# Patient Record
Sex: FEMALE | Race: BLACK OR AFRICAN AMERICAN | Employment: OTHER | URBAN - METROPOLITAN AREA
[De-identification: names, ages, dates, MRNs, and addresses within clinical notes are randomized per-mention and may not be internally consistent; named-entity substitution may affect disease eponyms.]

---

## 2018-03-08 DIAGNOSIS — R52 PAIN: Primary | ICD-10-CM

## 2018-03-13 ENCOUNTER — HOSPITAL ENCOUNTER (OUTPATIENT)
Dept: GENERAL RADIOLOGY | Age: 53
Discharge: HOME | End: 2018-03-15
Payer: MEDICAID

## 2018-03-13 ENCOUNTER — OFFICE VISIT (OUTPATIENT)
Dept: ORTHOPEDIC SURGERY | Age: 53
End: 2018-03-13
Payer: MEDICAID

## 2018-03-13 VITALS — RESPIRATION RATE: 17 BRPM | HEIGHT: 70 IN | OXYGEN SATURATION: 97 % | WEIGHT: 190 LBS | BODY MASS INDEX: 27.2 KG/M2

## 2018-03-13 DIAGNOSIS — R52 PAIN: ICD-10-CM

## 2018-03-13 DIAGNOSIS — S82.842A BIMALLEOLAR ANKLE FRACTURE, LEFT, CLOSED, INITIAL ENCOUNTER: ICD-10-CM

## 2018-03-13 PROCEDURE — G8484 FLU IMMUNIZE NO ADMIN: HCPCS | Performed by: ORTHOPAEDIC SURGERY

## 2018-03-13 PROCEDURE — G8427 DOCREV CUR MEDS BY ELIG CLIN: HCPCS | Performed by: ORTHOPAEDIC SURGERY

## 2018-03-13 PROCEDURE — G8417 CALC BMI ABV UP PARAM F/U: HCPCS | Performed by: ORTHOPAEDIC SURGERY

## 2018-03-13 PROCEDURE — 3017F COLORECTAL CA SCREEN DOC REV: CPT | Performed by: ORTHOPAEDIC SURGERY

## 2018-03-13 PROCEDURE — 4004F PT TOBACCO SCREEN RCVD TLK: CPT | Performed by: ORTHOPAEDIC SURGERY

## 2018-03-13 PROCEDURE — 99215 OFFICE O/P EST HI 40 MIN: CPT | Performed by: ORTHOPAEDIC SURGERY

## 2018-03-13 PROCEDURE — 73610 X-RAY EXAM OF ANKLE: CPT

## 2018-03-13 RX ORDER — BUSPIRONE HYDROCHLORIDE 5 MG/1
5 TABLET ORAL 3 TIMES DAILY
Status: ON HOLD | COMMUNITY
End: 2018-05-14 | Stop reason: HOSPADM

## 2018-03-13 ASSESSMENT — ENCOUNTER SYMPTOMS
CONSTIPATION: 0
NAUSEA: 0
RHINORRHEA: 0
ABDOMINAL PAIN: 0
WHEEZING: 0
SHORTNESS OF BREATH: 0
SINUS PRESSURE: 1
DIARRHEA: 0
VOMITING: 0
BLOOD IN STOOL: 0
SORE THROAT: 0
COUGH: 1
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
## 2018-03-22 ENCOUNTER — CLINICAL DOCUMENTATION (OUTPATIENT)
Dept: ORTHOPEDIC SURGERY | Age: 53
End: 2018-03-22

## 2018-03-22 NOTE — PROGRESS NOTES
Notified today by Thiago Mcfarlane that patient called and cancelled surgery on same day. He wants letter of termination sent to patient. Letters x's 2 done -- 1 sent regular mail and 1 sent certified mail along with MONICA form.

## 2018-04-16 ENCOUNTER — HOSPITAL ENCOUNTER (EMERGENCY)
Age: 53
Discharge: HOME | End: 2018-04-16
Attending: EMERGENCY MEDICINE
Payer: MEDICAID

## 2018-04-16 ENCOUNTER — APPOINTMENT (OUTPATIENT)
Dept: GENERAL RADIOLOGY | Age: 53
End: 2018-04-16
Payer: MEDICAID

## 2018-04-16 VITALS
DIASTOLIC BLOOD PRESSURE: 152 MMHG | SYSTOLIC BLOOD PRESSURE: 205 MMHG | BODY MASS INDEX: 41.52 KG/M2 | TEMPERATURE: 98.4 F | HEART RATE: 97 BPM | HEIGHT: 70 IN | WEIGHT: 290 LBS | OXYGEN SATURATION: 97 %

## 2018-04-16 DIAGNOSIS — S82.892A CLOSED FRACTURE OF LEFT ANKLE, INITIAL ENCOUNTER: Primary | ICD-10-CM

## 2018-04-16 PROCEDURE — 29515 APPLICATION SHORT LEG SPLINT: CPT

## 2018-04-16 PROCEDURE — 6370000000 HC RX 637 (ALT 250 FOR IP): Performed by: NURSE PRACTITIONER

## 2018-04-16 PROCEDURE — 73610 X-RAY EXAM OF ANKLE: CPT

## 2018-04-16 PROCEDURE — 99283 EMERGENCY DEPT VISIT LOW MDM: CPT

## 2018-04-16 RX ORDER — NAPROXEN 500 MG/1
500 TABLET ORAL 2 TIMES DAILY
Qty: 14 TABLET | Refills: 0 | Status: SHIPPED | OUTPATIENT
Start: 2018-04-16 | End: 2018-05-04 | Stop reason: ALTCHOICE

## 2018-04-16 RX ORDER — IBUPROFEN 800 MG/1
800 TABLET ORAL ONCE
Status: COMPLETED | OUTPATIENT
Start: 2018-04-16 | End: 2018-04-16

## 2018-04-16 RX ADMIN — IBUPROFEN 800 MG: 800 TABLET, FILM COATED ORAL at 18:09

## 2018-04-16 ASSESSMENT — PAIN SCALES - GENERAL
PAINLEVEL_OUTOF10: 10
PAINLEVEL_OUTOF10: 10

## 2018-04-16 ASSESSMENT — PAIN DESCRIPTION - LOCATION: LOCATION: ANKLE

## 2018-04-16 ASSESSMENT — PAIN DESCRIPTION - ORIENTATION: ORIENTATION: LEFT

## 2018-04-16 ASSESSMENT — PAIN DESCRIPTION - ONSET: ONSET: ON-GOING

## 2018-04-16 ASSESSMENT — PAIN DESCRIPTION - FREQUENCY: FREQUENCY: CONTINUOUS

## 2018-04-16 ASSESSMENT — PAIN DESCRIPTION - DESCRIPTORS: DESCRIPTORS: ACHING;SORE;SHARP

## 2018-04-16 ASSESSMENT — PAIN DESCRIPTION - PAIN TYPE: TYPE: ACUTE PAIN

## 2018-04-16 ASSESSMENT — PAIN DESCRIPTION - PROGRESSION: CLINICAL_PROGRESSION: NOT CHANGED

## 2018-04-17 ENCOUNTER — TELEPHONE (OUTPATIENT)
Dept: ORTHOPEDIC SURGERY | Age: 53
End: 2018-04-17

## 2018-05-04 ENCOUNTER — HOSPITAL ENCOUNTER (INPATIENT)
Age: 53
LOS: 10 days | Discharge: HOME | DRG: 751 | End: 2018-05-14
Attending: EMERGENCY MEDICINE | Admitting: PSYCHIATRY & NEUROLOGY
Payer: MEDICAID

## 2018-05-04 DIAGNOSIS — R45.851 SUICIDAL IDEATION: Primary | ICD-10-CM

## 2018-05-04 PROBLEM — F33.9 MAJOR DEPRESSION, RECURRENT (HCC): Status: ACTIVE | Noted: 2018-05-04

## 2018-05-04 LAB
ACETAMINOPHEN LEVEL: <5 MCG/ML (ref 10–30)
ALBUMIN SERPL-MCNC: 3.7 G/DL (ref 3.5–5.2)
ALP BLD-CCNC: 133 U/L (ref 35–104)
ALT SERPL-CCNC: 5 U/L (ref 0–32)
AMPHETAMINE SCREEN, URINE: NOT DETECTED
ANION GAP SERPL CALCULATED.3IONS-SCNC: 14 MMOL/L (ref 7–16)
AST SERPL-CCNC: 10 U/L (ref 0–31)
BACTERIA: ABNORMAL /HPF
BARBITURATE SCREEN URINE: NOT DETECTED
BASOPHILS ABSOLUTE: 0.02 E9/L (ref 0–0.2)
BASOPHILS RELATIVE PERCENT: 0.2 % (ref 0–2)
BENZODIAZEPINE SCREEN, URINE: NOT DETECTED
BILIRUB SERPL-MCNC: 0.6 MG/DL (ref 0–1.2)
BILIRUBIN URINE: NEGATIVE
BLOOD, URINE: ABNORMAL
BUN BLDV-MCNC: 7 MG/DL (ref 6–20)
CALCIUM SERPL-MCNC: 8.8 MG/DL (ref 8.6–10.2)
CANNABINOID SCREEN URINE: NOT DETECTED
CHLORIDE BLD-SCNC: 100 MMOL/L (ref 98–107)
CHP ED QC CHECK: YES
CLARITY: CLEAR
CO2: 27 MMOL/L (ref 22–29)
COCAINE METABOLITE SCREEN URINE: POSITIVE
COLOR: YELLOW
CREAT SERPL-MCNC: 0.8 MG/DL (ref 0.5–1)
EKG ATRIAL RATE: 86 BPM
EKG P AXIS: 47 DEGREES
EKG P-R INTERVAL: 138 MS
EKG Q-T INTERVAL: 398 MS
EKG QRS DURATION: 88 MS
EKG QTC CALCULATION (BAZETT): 476 MS
EKG R AXIS: 4 DEGREES
EKG T AXIS: 2 DEGREES
EKG VENTRICULAR RATE: 86 BPM
EOSINOPHILS ABSOLUTE: 0.08 E9/L (ref 0.05–0.5)
EOSINOPHILS RELATIVE PERCENT: 0.8 % (ref 0–6)
EPITHELIAL CELLS, UA: ABNORMAL /HPF
ETHANOL: <10 MG/DL (ref 0–0.08)
GFR AFRICAN AMERICAN: >60
GFR NON-AFRICAN AMERICAN: >60 ML/MIN/1.73
GLUCOSE BLD-MCNC: 93 MG/DL (ref 74–109)
GLUCOSE URINE: NEGATIVE MG/DL
HCT VFR BLD CALC: 38.2 % (ref 34–48)
HEMOGLOBIN: 12.3 G/DL (ref 11.5–15.5)
IMMATURE GRANULOCYTES #: 0.03 E9/L
IMMATURE GRANULOCYTES %: 0.3 % (ref 0–5)
KETONES, URINE: 15 MG/DL
LEUKOCYTE ESTERASE, URINE: NEGATIVE
LYMPHOCYTES ABSOLUTE: 3.36 E9/L (ref 1.5–4)
LYMPHOCYTES RELATIVE PERCENT: 32.8 % (ref 20–42)
MCH RBC QN AUTO: 25.1 PG (ref 26–35)
MCHC RBC AUTO-ENTMCNC: 32.2 % (ref 32–34.5)
MCV RBC AUTO: 77.8 FL (ref 80–99.9)
METHADONE SCREEN, URINE: NOT DETECTED
MONOCYTES ABSOLUTE: 0.54 E9/L (ref 0.1–0.95)
MONOCYTES RELATIVE PERCENT: 5.3 % (ref 2–12)
NEUTROPHILS ABSOLUTE: 6.21 E9/L (ref 1.8–7.3)
NEUTROPHILS RELATIVE PERCENT: 60.6 % (ref 43–80)
NITRITE, URINE: NEGATIVE
OPIATE SCREEN URINE: NOT DETECTED
PDW BLD-RTO: 17 FL (ref 11.5–15)
PH UA: 6 (ref 5–9)
PHENCYCLIDINE SCREEN URINE: NOT DETECTED
PLATELET # BLD: 368 E9/L (ref 130–450)
PMV BLD AUTO: 10 FL (ref 7–12)
POTASSIUM SERPL-SCNC: 3.5 MMOL/L (ref 3.5–5)
PREGNANCY TEST URINE, POC: NEGATIVE
PROPOXYPHENE SCREEN: NOT DETECTED
PROTEIN UA: NEGATIVE MG/DL
RBC # BLD: 4.91 E12/L (ref 3.5–5.5)
RBC UA: ABNORMAL /HPF (ref 0–2)
SALICYLATE, SERUM: <0.3 MG/DL (ref 0–30)
SODIUM BLD-SCNC: 141 MMOL/L (ref 132–146)
SPECIFIC GRAVITY UA: 1.01 (ref 1–1.03)
TOTAL PROTEIN: 7.2 G/DL (ref 6.4–8.3)
TRICYCLIC ANTIDEPRESSANTS SCREEN SERUM: NEGATIVE NG/ML
UROBILINOGEN, URINE: 0.2 E.U./DL
WBC # BLD: 10.2 E9/L (ref 4.5–11.5)
WBC UA: ABNORMAL /HPF (ref 0–5)

## 2018-05-04 PROCEDURE — 80307 DRUG TEST PRSMV CHEM ANLYZR: CPT

## 2018-05-04 PROCEDURE — 81001 URINALYSIS AUTO W/SCOPE: CPT

## 2018-05-04 PROCEDURE — 96374 THER/PROPH/DIAG INJ IV PUSH: CPT

## 2018-05-04 PROCEDURE — G0480 DRUG TEST DEF 1-7 CLASSES: HCPCS

## 2018-05-04 PROCEDURE — 80053 COMPREHEN METABOLIC PANEL: CPT

## 2018-05-04 PROCEDURE — 1240000000 HC EMOTIONAL WELLNESS R&B

## 2018-05-04 PROCEDURE — 85025 COMPLETE CBC W/AUTO DIFF WBC: CPT

## 2018-05-04 PROCEDURE — 6370000000 HC RX 637 (ALT 250 FOR IP): Performed by: PSYCHIATRY & NEUROLOGY

## 2018-05-04 PROCEDURE — 36415 COLL VENOUS BLD VENIPUNCTURE: CPT

## 2018-05-04 PROCEDURE — 99285 EMERGENCY DEPT VISIT HI MDM: CPT

## 2018-05-04 PROCEDURE — 6370000000 HC RX 637 (ALT 250 FOR IP): Performed by: EMERGENCY MEDICINE

## 2018-05-04 RX ORDER — HALOPERIDOL 5 MG
5 TABLET ORAL 2 TIMES DAILY
Status: DISCONTINUED | OUTPATIENT
Start: 2018-05-04 | End: 2018-05-06

## 2018-05-04 RX ORDER — LORAZEPAM 1 MG/1
0.5 TABLET ORAL EVERY 6 HOURS PRN
Status: DISCONTINUED | OUTPATIENT
Start: 2018-05-04 | End: 2018-05-10

## 2018-05-04 RX ORDER — DOCUSATE SODIUM 100 MG/1
100 CAPSULE, LIQUID FILLED ORAL 2 TIMES DAILY PRN
Status: DISCONTINUED | OUTPATIENT
Start: 2018-05-04 | End: 2018-05-14 | Stop reason: HOSPADM

## 2018-05-04 RX ORDER — LORAZEPAM 1 MG/1
2 TABLET ORAL ONCE
Status: COMPLETED | OUTPATIENT
Start: 2018-05-04 | End: 2018-05-04

## 2018-05-04 RX ORDER — QUETIAPINE FUMARATE 100 MG/1
100 TABLET, FILM COATED ORAL NIGHTLY
Status: DISCONTINUED | OUTPATIENT
Start: 2018-05-04 | End: 2018-05-06

## 2018-05-04 RX ORDER — QUETIAPINE FUMARATE 100 MG/1
1 TABLET, FILM COATED ORAL NIGHTLY
Status: ON HOLD | COMMUNITY
Start: 2018-04-26 | End: 2018-05-14 | Stop reason: HOSPADM

## 2018-05-04 RX ORDER — BENZTROPINE MESYLATE 2 MG/1
2 TABLET ORAL 2 TIMES DAILY
Status: DISCONTINUED | OUTPATIENT
Start: 2018-05-04 | End: 2018-05-14 | Stop reason: HOSPADM

## 2018-05-04 RX ORDER — ALBUTEROL SULFATE 90 UG/1
2 AEROSOL, METERED RESPIRATORY (INHALATION) EVERY 6 HOURS PRN
Status: DISCONTINUED | OUTPATIENT
Start: 2018-05-04 | End: 2018-05-14 | Stop reason: HOSPADM

## 2018-05-04 RX ORDER — ALBUTEROL SULFATE 90 UG/1
2 AEROSOL, METERED RESPIRATORY (INHALATION) EVERY 6 HOURS PRN
COMMUNITY
End: 2022-10-12

## 2018-05-04 RX ORDER — BUSPIRONE HYDROCHLORIDE 5 MG/1
5 TABLET ORAL 3 TIMES DAILY
Status: DISCONTINUED | OUTPATIENT
Start: 2018-05-04 | End: 2018-05-06

## 2018-05-04 RX ORDER — ACETAMINOPHEN 325 MG/1
650 TABLET ORAL EVERY 4 HOURS PRN
Status: DISCONTINUED | OUTPATIENT
Start: 2018-05-04 | End: 2018-05-14 | Stop reason: HOSPADM

## 2018-05-04 RX ADMIN — BENZTROPINE MESYLATE 2 MG: 2 TABLET ORAL at 22:57

## 2018-05-04 RX ADMIN — BUSPIRONE HYDROCHLORIDE 5 MG: 5 TABLET ORAL at 22:58

## 2018-05-04 RX ADMIN — LORAZEPAM 2 MG: 1 TABLET ORAL at 20:03

## 2018-05-04 RX ADMIN — QUETIAPINE FUMARATE 100 MG: 100 TABLET ORAL at 22:58

## 2018-05-04 RX ADMIN — HALOPERIDOL 5 MG: 5 TABLET ORAL at 22:58

## 2018-05-04 ASSESSMENT — SLEEP AND FATIGUE QUESTIONNAIRES
DO YOU HAVE DIFFICULTY SLEEPING: YES
RESTFUL SLEEP: NO
DIFFICULTY FALLING ASLEEP: YES
DIFFICULTY STAYING ASLEEP: YES
DO YOU USE A SLEEP AID: YES
AVERAGE NUMBER OF SLEEP HOURS: 4
DIFFICULTY ARISING: YES
SLEEP PATTERN: DIFFICULTY FALLING ASLEEP;DIFFICULTY ARISING;DISTURBED/INTERRUPTED SLEEP;EARLY AWAKENING;RESTLESSNESS;NIGHTMARES/TERRORS

## 2018-05-04 ASSESSMENT — PATIENT HEALTH QUESTIONNAIRE - PHQ9: SUM OF ALL RESPONSES TO PHQ QUESTIONS 1-9: 23

## 2018-05-04 ASSESSMENT — PAIN SCALES - GENERAL
PAINLEVEL_OUTOF10: 0
PAINLEVEL_OUTOF10: 8

## 2018-05-04 ASSESSMENT — LIFESTYLE VARIABLES: HISTORY_ALCOHOL_USE: NO

## 2018-05-05 LAB — TSH SERPL DL<=0.05 MIU/L-ACNC: 0.97 UIU/ML (ref 0.27–4.2)

## 2018-05-05 PROCEDURE — 1240000000 HC EMOTIONAL WELLNESS R&B

## 2018-05-05 PROCEDURE — 84443 ASSAY THYROID STIM HORMONE: CPT

## 2018-05-05 PROCEDURE — 99221 1ST HOSP IP/OBS SF/LOW 40: CPT | Performed by: PSYCHIATRY & NEUROLOGY

## 2018-05-05 PROCEDURE — 6370000000 HC RX 637 (ALT 250 FOR IP): Performed by: PSYCHIATRY & NEUROLOGY

## 2018-05-05 PROCEDURE — 36415 COLL VENOUS BLD VENIPUNCTURE: CPT

## 2018-05-05 RX ADMIN — BENZTROPINE MESYLATE 2 MG: 2 TABLET ORAL at 08:28

## 2018-05-05 RX ADMIN — BUSPIRONE HYDROCHLORIDE 5 MG: 5 TABLET ORAL at 20:28

## 2018-05-05 RX ADMIN — LORAZEPAM 0.5 MG: 1 TABLET ORAL at 11:21

## 2018-05-05 RX ADMIN — HALOPERIDOL 5 MG: 5 TABLET ORAL at 08:28

## 2018-05-05 RX ADMIN — HALOPERIDOL 5 MG: 5 TABLET ORAL at 20:28

## 2018-05-05 RX ADMIN — BENZTROPINE MESYLATE 2 MG: 2 TABLET ORAL at 20:28

## 2018-05-05 RX ADMIN — ACETAMINOPHEN 650 MG: 325 TABLET, FILM COATED ORAL at 19:11

## 2018-05-05 RX ADMIN — BUSPIRONE HYDROCHLORIDE 5 MG: 5 TABLET ORAL at 08:28

## 2018-05-05 RX ADMIN — QUETIAPINE FUMARATE 100 MG: 100 TABLET ORAL at 20:28

## 2018-05-05 RX ADMIN — BUSPIRONE HYDROCHLORIDE 5 MG: 5 TABLET ORAL at 13:52

## 2018-05-05 RX ADMIN — LORAZEPAM 0.5 MG: 1 TABLET ORAL at 17:47

## 2018-05-05 ASSESSMENT — SLEEP AND FATIGUE QUESTIONNAIRES
DO YOU HAVE DIFFICULTY SLEEPING: YES
DIFFICULTY STAYING ASLEEP: YES
DIFFICULTY ARISING: YES
AVERAGE NUMBER OF SLEEP HOURS: 4
RESTFUL SLEEP: NO
DO YOU USE A SLEEP AID: YES
SLEEP PATTERN: DIFFICULTY FALLING ASLEEP;DISTURBED/INTERRUPTED SLEEP;EARLY AWAKENING;RESTLESSNESS;NIGHTMARES/TERRORS
DIFFICULTY FALLING ASLEEP: YES

## 2018-05-05 ASSESSMENT — PATIENT HEALTH QUESTIONNAIRE - PHQ9: SUM OF ALL RESPONSES TO PHQ QUESTIONS 1-9: 23

## 2018-05-05 ASSESSMENT — PAIN - FUNCTIONAL ASSESSMENT: PAIN_FUNCTIONAL_ASSESSMENT: 0-10

## 2018-05-05 ASSESSMENT — PAIN SCALES - GENERAL: PAINLEVEL_OUTOF10: 9

## 2018-05-05 ASSESSMENT — LIFESTYLE VARIABLES: HISTORY_ALCOHOL_USE: NO

## 2018-05-06 ENCOUNTER — APPOINTMENT (OUTPATIENT)
Dept: GENERAL RADIOLOGY | Age: 53
DRG: 751 | End: 2018-05-06
Payer: MEDICAID

## 2018-05-06 PROCEDURE — 6370000000 HC RX 637 (ALT 250 FOR IP): Performed by: PSYCHIATRY & NEUROLOGY

## 2018-05-06 PROCEDURE — 1240000000 HC EMOTIONAL WELLNESS R&B

## 2018-05-06 PROCEDURE — 6370000000 HC RX 637 (ALT 250 FOR IP): Performed by: STUDENT IN AN ORGANIZED HEALTH CARE EDUCATION/TRAINING PROGRAM

## 2018-05-06 PROCEDURE — 73610 X-RAY EXAM OF ANKLE: CPT

## 2018-05-06 PROCEDURE — 99231 SBSQ HOSP IP/OBS SF/LOW 25: CPT | Performed by: PSYCHIATRY & NEUROLOGY

## 2018-05-06 RX ORDER — QUETIAPINE FUMARATE 300 MG/1
300 TABLET, FILM COATED ORAL NIGHTLY
Status: DISCONTINUED | OUTPATIENT
Start: 2018-05-06 | End: 2018-05-08

## 2018-05-06 RX ORDER — IBUPROFEN 600 MG/1
600 TABLET ORAL 2 TIMES DAILY
Status: DISCONTINUED | OUTPATIENT
Start: 2018-05-06 | End: 2018-05-14 | Stop reason: HOSPADM

## 2018-05-06 RX ORDER — BUSPIRONE HYDROCHLORIDE 10 MG/1
10 TABLET ORAL 3 TIMES DAILY
Status: DISCONTINUED | OUTPATIENT
Start: 2018-05-06 | End: 2018-05-14 | Stop reason: HOSPADM

## 2018-05-06 RX ORDER — DICLOFENAC SODIUM AND MISOPROSTOL 75; 200 MG/1; UG/1
1 TABLET, DELAYED RELEASE ORAL 2 TIMES DAILY
Status: DISCONTINUED | OUTPATIENT
Start: 2018-05-06 | End: 2018-05-06 | Stop reason: SDUPTHER

## 2018-05-06 RX ORDER — MISOPROSTOL 200 UG/1
200 TABLET ORAL 2 TIMES DAILY
Status: DISCONTINUED | OUTPATIENT
Start: 2018-05-06 | End: 2018-05-14 | Stop reason: HOSPADM

## 2018-05-06 RX ADMIN — ACETAMINOPHEN 650 MG: 325 TABLET, FILM COATED ORAL at 18:55

## 2018-05-06 RX ADMIN — BUSPIRONE HYDROCHLORIDE 10 MG: 10 TABLET ORAL at 13:02

## 2018-05-06 RX ADMIN — BUSPIRONE HYDROCHLORIDE 10 MG: 10 TABLET ORAL at 20:28

## 2018-05-06 RX ADMIN — MISOPROSTOL 200 MCG: 200 TABLET ORAL at 22:07

## 2018-05-06 RX ADMIN — LORAZEPAM 0.5 MG: 1 TABLET ORAL at 09:32

## 2018-05-06 RX ADMIN — BENZTROPINE MESYLATE 2 MG: 2 TABLET ORAL at 20:28

## 2018-05-06 RX ADMIN — ACETAMINOPHEN 650 MG: 325 TABLET, FILM COATED ORAL at 14:40

## 2018-05-06 RX ADMIN — QUETIAPINE FUMARATE 300 MG: 300 TABLET ORAL at 20:28

## 2018-05-06 RX ADMIN — BUSPIRONE HYDROCHLORIDE 5 MG: 5 TABLET ORAL at 08:22

## 2018-05-06 RX ADMIN — BENZTROPINE MESYLATE 2 MG: 2 TABLET ORAL at 08:22

## 2018-05-06 RX ADMIN — IBUPROFEN 600 MG: 600 TABLET ORAL at 20:28

## 2018-05-06 RX ADMIN — LORAZEPAM 0.5 MG: 1 TABLET ORAL at 16:31

## 2018-05-06 RX ADMIN — HALOPERIDOL 5 MG: 5 TABLET ORAL at 08:22

## 2018-05-06 ASSESSMENT — PAIN SCALES - GENERAL
PAINLEVEL_OUTOF10: 9

## 2018-05-06 ASSESSMENT — PAIN - FUNCTIONAL ASSESSMENT: PAIN_FUNCTIONAL_ASSESSMENT: 0-10

## 2018-05-07 ENCOUNTER — APPOINTMENT (OUTPATIENT)
Dept: CT IMAGING | Age: 53
DRG: 751 | End: 2018-05-07
Payer: MEDICAID

## 2018-05-07 LAB — COCAINE, CONFIRM, URINE: >1000 NG/ML

## 2018-05-07 PROCEDURE — 73700 CT LOWER EXTREMITY W/O DYE: CPT

## 2018-05-07 PROCEDURE — 6370000000 HC RX 637 (ALT 250 FOR IP): Performed by: STUDENT IN AN ORGANIZED HEALTH CARE EDUCATION/TRAINING PROGRAM

## 2018-05-07 PROCEDURE — 1240000000 HC EMOTIONAL WELLNESS R&B

## 2018-05-07 PROCEDURE — 99232 SBSQ HOSP IP/OBS MODERATE 35: CPT | Performed by: PSYCHIATRY & NEUROLOGY

## 2018-05-07 PROCEDURE — 6370000000 HC RX 637 (ALT 250 FOR IP): Performed by: PSYCHIATRY & NEUROLOGY

## 2018-05-07 RX ORDER — BUPROPION HYDROCHLORIDE 150 MG/1
150 TABLET ORAL DAILY
Status: DISCONTINUED | OUTPATIENT
Start: 2018-05-07 | End: 2018-05-14 | Stop reason: HOSPADM

## 2018-05-07 RX ORDER — ARIPIPRAZOLE 5 MG/1
5 TABLET ORAL EVERY EVENING
Status: DISCONTINUED | OUTPATIENT
Start: 2018-05-07 | End: 2018-05-08

## 2018-05-07 RX ADMIN — BUSPIRONE HYDROCHLORIDE 10 MG: 10 TABLET ORAL at 08:02

## 2018-05-07 RX ADMIN — BENZTROPINE MESYLATE 2 MG: 2 TABLET ORAL at 08:02

## 2018-05-07 RX ADMIN — BUSPIRONE HYDROCHLORIDE 10 MG: 10 TABLET ORAL at 13:32

## 2018-05-07 RX ADMIN — LORAZEPAM 0.5 MG: 1 TABLET ORAL at 12:09

## 2018-05-07 RX ADMIN — BUSPIRONE HYDROCHLORIDE 10 MG: 10 TABLET ORAL at 20:04

## 2018-05-07 RX ADMIN — QUETIAPINE FUMARATE 300 MG: 300 TABLET ORAL at 20:04

## 2018-05-07 RX ADMIN — MISOPROSTOL 200 MCG: 200 TABLET ORAL at 09:51

## 2018-05-07 RX ADMIN — BENZTROPINE MESYLATE 2 MG: 2 TABLET ORAL at 20:04

## 2018-05-07 RX ADMIN — LORAZEPAM 0.5 MG: 1 TABLET ORAL at 20:03

## 2018-05-07 RX ADMIN — MISOPROSTOL 200 MCG: 200 TABLET ORAL at 20:04

## 2018-05-07 RX ADMIN — BUPROPION HYDROCHLORIDE 150 MG: 150 TABLET, FILM COATED, EXTENDED RELEASE ORAL at 13:31

## 2018-05-07 RX ADMIN — IBUPROFEN 600 MG: 600 TABLET ORAL at 20:04

## 2018-05-07 RX ADMIN — ARIPIPRAZOLE 5 MG: 5 TABLET ORAL at 17:27

## 2018-05-07 RX ADMIN — IBUPROFEN 600 MG: 600 TABLET ORAL at 08:02

## 2018-05-07 ASSESSMENT — PAIN SCALES - GENERAL
PAINLEVEL_OUTOF10: 9
PAINLEVEL_OUTOF10: 0
PAINLEVEL_OUTOF10: 0
PAINLEVEL_OUTOF10: 9

## 2018-05-07 ASSESSMENT — PAIN DESCRIPTION - DESCRIPTORS: DESCRIPTORS: ACHING;DISCOMFORT;THROBBING

## 2018-05-07 ASSESSMENT — PAIN DESCRIPTION - LOCATION: LOCATION: ANKLE

## 2018-05-07 ASSESSMENT — PAIN DESCRIPTION - PAIN TYPE: TYPE: ACUTE PAIN

## 2018-05-07 ASSESSMENT — PAIN DESCRIPTION - ORIENTATION: ORIENTATION: LEFT

## 2018-05-08 ENCOUNTER — APPOINTMENT (OUTPATIENT)
Dept: INTERVENTIONAL RADIOLOGY/VASCULAR | Age: 53
DRG: 751 | End: 2018-05-08
Payer: MEDICAID

## 2018-05-08 PROCEDURE — 6360000002 HC RX W HCPCS: Performed by: PSYCHIATRY & NEUROLOGY

## 2018-05-08 PROCEDURE — 1240000000 HC EMOTIONAL WELLNESS R&B

## 2018-05-08 PROCEDURE — 99232 SBSQ HOSP IP/OBS MODERATE 35: CPT | Performed by: PSYCHIATRY & NEUROLOGY

## 2018-05-08 PROCEDURE — 93923 UPR/LXTR ART STDY 3+ LVLS: CPT

## 2018-05-08 PROCEDURE — 6370000000 HC RX 637 (ALT 250 FOR IP): Performed by: STUDENT IN AN ORGANIZED HEALTH CARE EDUCATION/TRAINING PROGRAM

## 2018-05-08 PROCEDURE — 6370000000 HC RX 637 (ALT 250 FOR IP): Performed by: PSYCHIATRY & NEUROLOGY

## 2018-05-08 RX ORDER — ARIPIPRAZOLE 10 MG/1
10 TABLET ORAL EVERY EVENING
Status: DISCONTINUED | OUTPATIENT
Start: 2018-05-08 | End: 2018-05-09

## 2018-05-08 RX ORDER — ONDANSETRON 4 MG/1
4 TABLET, ORALLY DISINTEGRATING ORAL EVERY 8 HOURS PRN
Status: DISCONTINUED | OUTPATIENT
Start: 2018-05-08 | End: 2018-05-14 | Stop reason: HOSPADM

## 2018-05-08 RX ORDER — QUETIAPINE FUMARATE 100 MG/1
200 TABLET, FILM COATED ORAL NIGHTLY
Status: DISCONTINUED | OUTPATIENT
Start: 2018-05-08 | End: 2018-05-09

## 2018-05-08 RX ORDER — ARIPIPRAZOLE 10 MG/1
TABLET ORAL
Status: DISPENSED
Start: 2018-05-08 | End: 2018-05-09

## 2018-05-08 RX ADMIN — IBUPROFEN 600 MG: 600 TABLET ORAL at 19:59

## 2018-05-08 RX ADMIN — MISOPROSTOL 200 MCG: 200 TABLET ORAL at 19:59

## 2018-05-08 RX ADMIN — BUSPIRONE HYDROCHLORIDE 10 MG: 10 TABLET ORAL at 19:59

## 2018-05-08 RX ADMIN — LORAZEPAM 0.5 MG: 1 TABLET ORAL at 21:22

## 2018-05-08 RX ADMIN — BUSPIRONE HYDROCHLORIDE 10 MG: 10 TABLET ORAL at 09:23

## 2018-05-08 RX ADMIN — BENZTROPINE MESYLATE 2 MG: 2 TABLET ORAL at 19:59

## 2018-05-08 RX ADMIN — BUPROPION HYDROCHLORIDE 150 MG: 150 TABLET, FILM COATED, EXTENDED RELEASE ORAL at 09:23

## 2018-05-08 RX ADMIN — LORAZEPAM 0.5 MG: 1 TABLET ORAL at 15:05

## 2018-05-08 RX ADMIN — ONDANSETRON 4 MG: 4 TABLET, ORALLY DISINTEGRATING ORAL at 10:59

## 2018-05-08 RX ADMIN — BENZTROPINE MESYLATE 2 MG: 2 TABLET ORAL at 09:23

## 2018-05-08 RX ADMIN — BUSPIRONE HYDROCHLORIDE 10 MG: 10 TABLET ORAL at 15:05

## 2018-05-08 RX ADMIN — MISOPROSTOL 200 MCG: 200 TABLET ORAL at 09:23

## 2018-05-08 RX ADMIN — ACETAMINOPHEN 650 MG: 325 TABLET, FILM COATED ORAL at 21:21

## 2018-05-08 RX ADMIN — ARIPIPRAZOLE 10 MG: 10 TABLET ORAL at 19:05

## 2018-05-08 RX ADMIN — QUETIAPINE FUMARATE 200 MG: 100 TABLET ORAL at 19:59

## 2018-05-08 RX ADMIN — IBUPROFEN 600 MG: 600 TABLET ORAL at 09:23

## 2018-05-08 ASSESSMENT — PAIN SCALES - GENERAL
PAINLEVEL_OUTOF10: 9
PAINLEVEL_OUTOF10: 0
PAINLEVEL_OUTOF10: 8
PAINLEVEL_OUTOF10: 7

## 2018-05-09 PROBLEM — I10 ESSENTIAL HYPERTENSION: Status: ACTIVE | Noted: 2018-05-09

## 2018-05-09 PROBLEM — S82.892A CLOSED FRACTURE OF LEFT ANKLE: Status: ACTIVE | Noted: 2018-05-09

## 2018-05-09 PROCEDURE — 1240000000 HC EMOTIONAL WELLNESS R&B

## 2018-05-09 PROCEDURE — 99232 SBSQ HOSP IP/OBS MODERATE 35: CPT | Performed by: PSYCHIATRY & NEUROLOGY

## 2018-05-09 PROCEDURE — 6370000000 HC RX 637 (ALT 250 FOR IP): Performed by: CLINICAL NURSE SPECIALIST

## 2018-05-09 PROCEDURE — 6370000000 HC RX 637 (ALT 250 FOR IP): Performed by: PSYCHIATRY & NEUROLOGY

## 2018-05-09 PROCEDURE — 6370000000 HC RX 637 (ALT 250 FOR IP): Performed by: STUDENT IN AN ORGANIZED HEALTH CARE EDUCATION/TRAINING PROGRAM

## 2018-05-09 RX ORDER — AMLODIPINE BESYLATE 5 MG/1
5 TABLET ORAL DAILY
Status: DISCONTINUED | OUTPATIENT
Start: 2018-05-09 | End: 2018-05-14 | Stop reason: HOSPADM

## 2018-05-09 RX ORDER — QUETIAPINE FUMARATE 100 MG/1
100 TABLET, FILM COATED ORAL NIGHTLY
Status: DISCONTINUED | OUTPATIENT
Start: 2018-05-09 | End: 2018-05-10

## 2018-05-09 RX ORDER — ARIPIPRAZOLE 15 MG/1
15 TABLET ORAL EVERY EVENING
Status: DISCONTINUED | OUTPATIENT
Start: 2018-05-09 | End: 2018-05-11

## 2018-05-09 RX ADMIN — BUSPIRONE HYDROCHLORIDE 10 MG: 10 TABLET ORAL at 20:35

## 2018-05-09 RX ADMIN — IBUPROFEN 600 MG: 600 TABLET ORAL at 20:35

## 2018-05-09 RX ADMIN — BUSPIRONE HYDROCHLORIDE 10 MG: 10 TABLET ORAL at 13:40

## 2018-05-09 RX ADMIN — BENZTROPINE MESYLATE 2 MG: 2 TABLET ORAL at 08:28

## 2018-05-09 RX ADMIN — IBUPROFEN 600 MG: 600 TABLET ORAL at 08:28

## 2018-05-09 RX ADMIN — MISOPROSTOL 200 MCG: 200 TABLET ORAL at 08:28

## 2018-05-09 RX ADMIN — LORAZEPAM 0.5 MG: 1 TABLET ORAL at 16:30

## 2018-05-09 RX ADMIN — MISOPROSTOL 200 MCG: 200 TABLET ORAL at 20:35

## 2018-05-09 RX ADMIN — ARIPIPRAZOLE 15 MG: 15 TABLET ORAL at 16:30

## 2018-05-09 RX ADMIN — AMLODIPINE BESYLATE 5 MG: 5 TABLET ORAL at 10:26

## 2018-05-09 RX ADMIN — BENZTROPINE MESYLATE 2 MG: 2 TABLET ORAL at 20:35

## 2018-05-09 RX ADMIN — LORAZEPAM 0.5 MG: 1 TABLET ORAL at 10:27

## 2018-05-09 RX ADMIN — ACETAMINOPHEN 650 MG: 325 TABLET, FILM COATED ORAL at 13:40

## 2018-05-09 RX ADMIN — BUSPIRONE HYDROCHLORIDE 10 MG: 10 TABLET ORAL at 08:28

## 2018-05-09 RX ADMIN — BUPROPION HYDROCHLORIDE 150 MG: 150 TABLET, FILM COATED, EXTENDED RELEASE ORAL at 08:27

## 2018-05-09 RX ADMIN — QUETIAPINE FUMARATE 100 MG: 100 TABLET ORAL at 20:35

## 2018-05-09 ASSESSMENT — PAIN DESCRIPTION - DESCRIPTORS: DESCRIPTORS: ACHING;DISCOMFORT;THROBBING

## 2018-05-09 ASSESSMENT — PAIN SCALES - GENERAL
PAINLEVEL_OUTOF10: 9
PAINLEVEL_OUTOF10: 6
PAINLEVEL_OUTOF10: 3
PAINLEVEL_OUTOF10: 0
PAINLEVEL_OUTOF10: 0
PAINLEVEL_OUTOF10: 3

## 2018-05-09 ASSESSMENT — PAIN DESCRIPTION - ORIENTATION
ORIENTATION: LEFT
ORIENTATION: LEFT

## 2018-05-09 ASSESSMENT — PAIN DESCRIPTION - PAIN TYPE
TYPE: ACUTE PAIN
TYPE: ACUTE PAIN

## 2018-05-09 ASSESSMENT — PAIN DESCRIPTION - LOCATION
LOCATION: ANKLE;LEG
LOCATION: ANKLE

## 2018-05-10 PROCEDURE — 6370000000 HC RX 637 (ALT 250 FOR IP): Performed by: CLINICAL NURSE SPECIALIST

## 2018-05-10 PROCEDURE — 99232 SBSQ HOSP IP/OBS MODERATE 35: CPT | Performed by: PSYCHIATRY & NEUROLOGY

## 2018-05-10 PROCEDURE — 6370000000 HC RX 637 (ALT 250 FOR IP): Performed by: STUDENT IN AN ORGANIZED HEALTH CARE EDUCATION/TRAINING PROGRAM

## 2018-05-10 PROCEDURE — 1240000000 HC EMOTIONAL WELLNESS R&B

## 2018-05-10 PROCEDURE — 6370000000 HC RX 637 (ALT 250 FOR IP): Performed by: PSYCHIATRY & NEUROLOGY

## 2018-05-10 RX ORDER — DIAZEPAM 5 MG/1
5 TABLET ORAL 3 TIMES DAILY
Status: DISCONTINUED | OUTPATIENT
Start: 2018-05-10 | End: 2018-05-11

## 2018-05-10 RX ADMIN — BUSPIRONE HYDROCHLORIDE 10 MG: 10 TABLET ORAL at 08:16

## 2018-05-10 RX ADMIN — BENZTROPINE MESYLATE 2 MG: 2 TABLET ORAL at 08:15

## 2018-05-10 RX ADMIN — BUSPIRONE HYDROCHLORIDE 10 MG: 10 TABLET ORAL at 20:05

## 2018-05-10 RX ADMIN — DIAZEPAM 5 MG: 5 TABLET ORAL at 16:04

## 2018-05-10 RX ADMIN — BUSPIRONE HYDROCHLORIDE 10 MG: 10 TABLET ORAL at 13:27

## 2018-05-10 RX ADMIN — IBUPROFEN 600 MG: 600 TABLET ORAL at 20:04

## 2018-05-10 RX ADMIN — BENZTROPINE MESYLATE 2 MG: 2 TABLET ORAL at 20:05

## 2018-05-10 RX ADMIN — ARIPIPRAZOLE 15 MG: 15 TABLET ORAL at 16:04

## 2018-05-10 RX ADMIN — MISOPROSTOL 200 MCG: 200 TABLET ORAL at 20:04

## 2018-05-10 RX ADMIN — MISOPROSTOL 200 MCG: 200 TABLET ORAL at 08:16

## 2018-05-10 RX ADMIN — BUPROPION HYDROCHLORIDE 150 MG: 150 TABLET, FILM COATED, EXTENDED RELEASE ORAL at 08:15

## 2018-05-10 RX ADMIN — DIAZEPAM 5 MG: 5 TABLET ORAL at 20:05

## 2018-05-10 RX ADMIN — DIAZEPAM 5 MG: 5 TABLET ORAL at 11:43

## 2018-05-10 RX ADMIN — AMLODIPINE BESYLATE 5 MG: 5 TABLET ORAL at 08:15

## 2018-05-10 RX ADMIN — IBUPROFEN 600 MG: 600 TABLET ORAL at 08:15

## 2018-05-10 ASSESSMENT — PAIN SCALES - GENERAL
PAINLEVEL_OUTOF10: 0
PAINLEVEL_OUTOF10: 8
PAINLEVEL_OUTOF10: 7
PAINLEVEL_OUTOF10: 9

## 2018-05-10 ASSESSMENT — PAIN DESCRIPTION - LOCATION: LOCATION: ANKLE

## 2018-05-10 ASSESSMENT — PAIN DESCRIPTION - DESCRIPTORS: DESCRIPTORS: ACHING;DISCOMFORT;THROBBING

## 2018-05-10 ASSESSMENT — PAIN DESCRIPTION - PAIN TYPE: TYPE: CHRONIC PAIN

## 2018-05-10 ASSESSMENT — PAIN DESCRIPTION - ORIENTATION: ORIENTATION: LEFT

## 2018-05-11 PROCEDURE — 6370000000 HC RX 637 (ALT 250 FOR IP): Performed by: STUDENT IN AN ORGANIZED HEALTH CARE EDUCATION/TRAINING PROGRAM

## 2018-05-11 PROCEDURE — 6370000000 HC RX 637 (ALT 250 FOR IP)

## 2018-05-11 PROCEDURE — 1240000000 HC EMOTIONAL WELLNESS R&B

## 2018-05-11 PROCEDURE — 6370000000 HC RX 637 (ALT 250 FOR IP): Performed by: PSYCHIATRY & NEUROLOGY

## 2018-05-11 PROCEDURE — 99232 SBSQ HOSP IP/OBS MODERATE 35: CPT | Performed by: PSYCHIATRY & NEUROLOGY

## 2018-05-11 PROCEDURE — 6370000000 HC RX 637 (ALT 250 FOR IP): Performed by: CLINICAL NURSE SPECIALIST

## 2018-05-11 RX ORDER — DIAZEPAM 5 MG/1
10 TABLET ORAL 2 TIMES DAILY
Status: DISCONTINUED | OUTPATIENT
Start: 2018-05-11 | End: 2018-05-13

## 2018-05-11 RX ORDER — TRAZODONE HYDROCHLORIDE 50 MG/1
50 TABLET ORAL NIGHTLY PRN
Status: DISCONTINUED | OUTPATIENT
Start: 2018-05-11 | End: 2018-05-14 | Stop reason: HOSPADM

## 2018-05-11 RX ORDER — ARIPIPRAZOLE 10 MG/1
TABLET ORAL
Status: COMPLETED
Start: 2018-05-11 | End: 2018-05-11

## 2018-05-11 RX ORDER — ARIPIPRAZOLE 10 MG/1
20 TABLET ORAL EVERY EVENING
Status: DISCONTINUED | OUTPATIENT
Start: 2018-05-11 | End: 2018-05-14 | Stop reason: HOSPADM

## 2018-05-11 RX ADMIN — DIAZEPAM 10 MG: 5 TABLET ORAL at 20:36

## 2018-05-11 RX ADMIN — BENZTROPINE MESYLATE 2 MG: 2 TABLET ORAL at 08:37

## 2018-05-11 RX ADMIN — IBUPROFEN 600 MG: 600 TABLET ORAL at 08:37

## 2018-05-11 RX ADMIN — BUSPIRONE HYDROCHLORIDE 10 MG: 10 TABLET ORAL at 08:37

## 2018-05-11 RX ADMIN — BUPROPION HYDROCHLORIDE 150 MG: 150 TABLET, FILM COATED, EXTENDED RELEASE ORAL at 08:37

## 2018-05-11 RX ADMIN — ARIPIPRAZOLE 20 MG: 10 TABLET ORAL at 16:20

## 2018-05-11 RX ADMIN — BENZTROPINE MESYLATE 2 MG: 2 TABLET ORAL at 20:36

## 2018-05-11 RX ADMIN — TRAZODONE HYDROCHLORIDE 50 MG: 50 TABLET ORAL at 02:01

## 2018-05-11 RX ADMIN — TRAZODONE HYDROCHLORIDE 50 MG: 50 TABLET ORAL at 20:36

## 2018-05-11 RX ADMIN — MISOPROSTOL 200 MCG: 200 TABLET ORAL at 20:36

## 2018-05-11 RX ADMIN — MISOPROSTOL 200 MCG: 200 TABLET ORAL at 08:37

## 2018-05-11 RX ADMIN — BUSPIRONE HYDROCHLORIDE 10 MG: 10 TABLET ORAL at 20:36

## 2018-05-11 RX ADMIN — IBUPROFEN 600 MG: 600 TABLET ORAL at 20:36

## 2018-05-11 RX ADMIN — AMLODIPINE BESYLATE 5 MG: 5 TABLET ORAL at 08:37

## 2018-05-11 RX ADMIN — DIAZEPAM 5 MG: 5 TABLET ORAL at 08:37

## 2018-05-11 RX ADMIN — BUSPIRONE HYDROCHLORIDE 10 MG: 10 TABLET ORAL at 13:42

## 2018-05-11 ASSESSMENT — PAIN DESCRIPTION - PAIN TYPE: TYPE: CHRONIC PAIN

## 2018-05-11 ASSESSMENT — PAIN SCALES - GENERAL
PAINLEVEL_OUTOF10: 3
PAINLEVEL_OUTOF10: 6
PAINLEVEL_OUTOF10: 9

## 2018-05-11 ASSESSMENT — PAIN DESCRIPTION - LOCATION: LOCATION: GENERALIZED

## 2018-05-11 ASSESSMENT — PAIN DESCRIPTION - DESCRIPTORS: DESCRIPTORS: ACHING;DISCOMFORT

## 2018-05-11 ASSESSMENT — PAIN DESCRIPTION - ONSET: ONSET: ON-GOING

## 2018-05-11 ASSESSMENT — PAIN DESCRIPTION - PROGRESSION: CLINICAL_PROGRESSION: NOT CHANGED

## 2018-05-11 ASSESSMENT — PAIN DESCRIPTION - FREQUENCY: FREQUENCY: CONTINUOUS

## 2018-05-12 PROCEDURE — 6370000000 HC RX 637 (ALT 250 FOR IP): Performed by: STUDENT IN AN ORGANIZED HEALTH CARE EDUCATION/TRAINING PROGRAM

## 2018-05-12 PROCEDURE — 99231 SBSQ HOSP IP/OBS SF/LOW 25: CPT | Performed by: PSYCHIATRY & NEUROLOGY

## 2018-05-12 PROCEDURE — 6370000000 HC RX 637 (ALT 250 FOR IP): Performed by: PSYCHIATRY & NEUROLOGY

## 2018-05-12 PROCEDURE — 6370000000 HC RX 637 (ALT 250 FOR IP): Performed by: CLINICAL NURSE SPECIALIST

## 2018-05-12 PROCEDURE — 1240000000 HC EMOTIONAL WELLNESS R&B

## 2018-05-12 RX ADMIN — AMLODIPINE BESYLATE 5 MG: 5 TABLET ORAL at 08:22

## 2018-05-12 RX ADMIN — MISOPROSTOL 200 MCG: 200 TABLET ORAL at 08:22

## 2018-05-12 RX ADMIN — TRAZODONE HYDROCHLORIDE 50 MG: 50 TABLET ORAL at 20:22

## 2018-05-12 RX ADMIN — IBUPROFEN 600 MG: 600 TABLET ORAL at 20:22

## 2018-05-12 RX ADMIN — BUSPIRONE HYDROCHLORIDE 10 MG: 10 TABLET ORAL at 08:22

## 2018-05-12 RX ADMIN — BENZTROPINE MESYLATE 2 MG: 2 TABLET ORAL at 08:22

## 2018-05-12 RX ADMIN — ARIPIPRAZOLE 20 MG: 10 TABLET ORAL at 17:06

## 2018-05-12 RX ADMIN — DIAZEPAM 10 MG: 5 TABLET ORAL at 08:22

## 2018-05-12 RX ADMIN — BUSPIRONE HYDROCHLORIDE 10 MG: 10 TABLET ORAL at 20:22

## 2018-05-12 RX ADMIN — MISOPROSTOL 200 MCG: 200 TABLET ORAL at 20:22

## 2018-05-12 RX ADMIN — IBUPROFEN 600 MG: 600 TABLET ORAL at 08:21

## 2018-05-12 RX ADMIN — BUPROPION HYDROCHLORIDE 150 MG: 150 TABLET, FILM COATED, EXTENDED RELEASE ORAL at 08:22

## 2018-05-12 RX ADMIN — BENZTROPINE MESYLATE 2 MG: 2 TABLET ORAL at 20:22

## 2018-05-12 RX ADMIN — BUSPIRONE HYDROCHLORIDE 10 MG: 10 TABLET ORAL at 13:45

## 2018-05-12 RX ADMIN — DIAZEPAM 10 MG: 5 TABLET ORAL at 20:22

## 2018-05-12 ASSESSMENT — PAIN DESCRIPTION - ORIENTATION: ORIENTATION: LOWER

## 2018-05-12 ASSESSMENT — PAIN SCALES - GENERAL
PAINLEVEL_OUTOF10: 7
PAINLEVEL_OUTOF10: 5
PAINLEVEL_OUTOF10: 7

## 2018-05-12 ASSESSMENT — PAIN DESCRIPTION - FREQUENCY: FREQUENCY: CONTINUOUS

## 2018-05-12 ASSESSMENT — PAIN DESCRIPTION - DESCRIPTORS: DESCRIPTORS: ACHING;CRAMPING;DISCOMFORT

## 2018-05-12 ASSESSMENT — PAIN DESCRIPTION - LOCATION: LOCATION: BACK

## 2018-05-12 ASSESSMENT — PAIN DESCRIPTION - ONSET: ONSET: GRADUAL

## 2018-05-12 ASSESSMENT — PAIN DESCRIPTION - PAIN TYPE: TYPE: ACUTE PAIN

## 2018-05-13 PROCEDURE — 6370000000 HC RX 637 (ALT 250 FOR IP): Performed by: STUDENT IN AN ORGANIZED HEALTH CARE EDUCATION/TRAINING PROGRAM

## 2018-05-13 PROCEDURE — 1240000000 HC EMOTIONAL WELLNESS R&B

## 2018-05-13 PROCEDURE — 6370000000 HC RX 637 (ALT 250 FOR IP): Performed by: PSYCHIATRY & NEUROLOGY

## 2018-05-13 PROCEDURE — 99231 SBSQ HOSP IP/OBS SF/LOW 25: CPT | Performed by: PSYCHIATRY & NEUROLOGY

## 2018-05-13 PROCEDURE — 6370000000 HC RX 637 (ALT 250 FOR IP): Performed by: CLINICAL NURSE SPECIALIST

## 2018-05-13 RX ORDER — DIAZEPAM 5 MG/1
10 TABLET ORAL NIGHTLY
Status: DISCONTINUED | OUTPATIENT
Start: 2018-05-14 | End: 2018-05-14 | Stop reason: HOSPADM

## 2018-05-13 RX ADMIN — BUSPIRONE HYDROCHLORIDE 10 MG: 10 TABLET ORAL at 09:51

## 2018-05-13 RX ADMIN — IBUPROFEN 600 MG: 600 TABLET ORAL at 09:52

## 2018-05-13 RX ADMIN — MISOPROSTOL 200 MCG: 200 TABLET ORAL at 09:51

## 2018-05-13 RX ADMIN — AMLODIPINE BESYLATE 5 MG: 5 TABLET ORAL at 09:52

## 2018-05-13 RX ADMIN — IBUPROFEN 600 MG: 600 TABLET ORAL at 20:23

## 2018-05-13 RX ADMIN — MISOPROSTOL 200 MCG: 200 TABLET ORAL at 20:22

## 2018-05-13 RX ADMIN — BENZTROPINE MESYLATE 2 MG: 2 TABLET ORAL at 09:51

## 2018-05-13 RX ADMIN — TRAZODONE HYDROCHLORIDE 50 MG: 50 TABLET ORAL at 20:22

## 2018-05-13 RX ADMIN — BUSPIRONE HYDROCHLORIDE 10 MG: 10 TABLET ORAL at 20:22

## 2018-05-13 RX ADMIN — ACETAMINOPHEN 650 MG: 325 TABLET, FILM COATED ORAL at 14:12

## 2018-05-13 RX ADMIN — BUPROPION HYDROCHLORIDE 150 MG: 150 TABLET, FILM COATED, EXTENDED RELEASE ORAL at 09:52

## 2018-05-13 RX ADMIN — BENZTROPINE MESYLATE 2 MG: 2 TABLET ORAL at 20:22

## 2018-05-13 RX ADMIN — ARIPIPRAZOLE 20 MG: 10 TABLET ORAL at 16:55

## 2018-05-13 RX ADMIN — DIAZEPAM 10 MG: 5 TABLET ORAL at 09:56

## 2018-05-13 RX ADMIN — BUSPIRONE HYDROCHLORIDE 10 MG: 10 TABLET ORAL at 14:12

## 2018-05-13 ASSESSMENT — PAIN DESCRIPTION - DESCRIPTORS
DESCRIPTORS: ACHING;DISCOMFORT;HEADACHE
DESCRIPTORS: ACHING;CRAMPING;DISCOMFORT
DESCRIPTORS: ACHING;CRAMPING;DISCOMFORT

## 2018-05-13 ASSESSMENT — PAIN DESCRIPTION - PAIN TYPE
TYPE: ACUTE PAIN
TYPE: CHRONIC PAIN
TYPE: ACUTE PAIN

## 2018-05-13 ASSESSMENT — PAIN SCALES - GENERAL
PAINLEVEL_OUTOF10: 7
PAINLEVEL_OUTOF10: 9
PAINLEVEL_OUTOF10: 8
PAINLEVEL_OUTOF10: 9

## 2018-05-13 ASSESSMENT — PAIN DESCRIPTION - PROGRESSION: CLINICAL_PROGRESSION: GRADUALLY WORSENING

## 2018-05-13 ASSESSMENT — PAIN DESCRIPTION - FREQUENCY
FREQUENCY: CONTINUOUS
FREQUENCY: INTERMITTENT
FREQUENCY: CONTINUOUS

## 2018-05-13 ASSESSMENT — PAIN DESCRIPTION - ORIENTATION
ORIENTATION: ANTERIOR
ORIENTATION: LOWER

## 2018-05-13 ASSESSMENT — PAIN DESCRIPTION - LOCATION
LOCATION: BACK
LOCATION: HEAD
LOCATION: HEAD

## 2018-05-13 ASSESSMENT — PAIN DESCRIPTION - ONSET
ONSET: GRADUAL
ONSET: GRADUAL
ONSET: PROGRESSIVE

## 2018-05-14 VITALS
SYSTOLIC BLOOD PRESSURE: 121 MMHG | TEMPERATURE: 98.4 F | BODY MASS INDEX: 41.47 KG/M2 | DIASTOLIC BLOOD PRESSURE: 81 MMHG | OXYGEN SATURATION: 98 % | WEIGHT: 280 LBS | RESPIRATION RATE: 16 BRPM | HEIGHT: 69 IN | HEART RATE: 81 BPM

## 2018-05-14 PROCEDURE — 99238 HOSP IP/OBS DSCHRG MGMT 30/<: CPT | Performed by: PSYCHIATRY & NEUROLOGY

## 2018-05-14 PROCEDURE — 6370000000 HC RX 637 (ALT 250 FOR IP): Performed by: PSYCHIATRY & NEUROLOGY

## 2018-05-14 PROCEDURE — 6370000000 HC RX 637 (ALT 250 FOR IP): Performed by: CLINICAL NURSE SPECIALIST

## 2018-05-14 PROCEDURE — G8978 MOBILITY CURRENT STATUS: HCPCS

## 2018-05-14 PROCEDURE — 6370000000 HC RX 637 (ALT 250 FOR IP): Performed by: STUDENT IN AN ORGANIZED HEALTH CARE EDUCATION/TRAINING PROGRAM

## 2018-05-14 PROCEDURE — 97530 THERAPEUTIC ACTIVITIES: CPT

## 2018-05-14 PROCEDURE — G8979 MOBILITY GOAL STATUS: HCPCS

## 2018-05-14 PROCEDURE — 97162 PT EVAL MOD COMPLEX 30 MIN: CPT

## 2018-05-14 RX ORDER — ARIPIPRAZOLE 20 MG/1
20 TABLET ORAL EVERY EVENING
Qty: 30 TABLET | Refills: 0 | Status: SHIPPED | OUTPATIENT
Start: 2018-05-14 | End: 2022-10-12

## 2018-05-14 RX ORDER — BUSPIRONE HYDROCHLORIDE 10 MG/1
10 TABLET ORAL 3 TIMES DAILY
Qty: 90 TABLET | Refills: 0 | Status: SHIPPED | OUTPATIENT
Start: 2018-05-14 | End: 2022-10-12

## 2018-05-14 RX ORDER — BUPROPION HYDROCHLORIDE 150 MG/1
150 TABLET ORAL DAILY
Qty: 30 TABLET | Refills: 0 | Status: SHIPPED | OUTPATIENT
Start: 2018-05-15 | End: 2022-10-12

## 2018-05-14 RX ORDER — AMLODIPINE BESYLATE 5 MG/1
5 TABLET ORAL DAILY
Qty: 30 TABLET | Refills: 0 | Status: SHIPPED | OUTPATIENT
Start: 2018-05-15 | End: 2022-10-12

## 2018-05-14 RX ORDER — DIAZEPAM 10 MG/1
10 TABLET ORAL NIGHTLY
Qty: 10 TABLET | Refills: 0 | Status: SHIPPED | OUTPATIENT
Start: 2018-05-14 | End: 2018-05-24

## 2018-05-14 RX ORDER — MISOPROSTOL 200 UG/1
200 TABLET ORAL 2 TIMES DAILY
Qty: 60 TABLET | Refills: 0 | Status: SHIPPED | OUTPATIENT
Start: 2018-05-14 | End: 2022-10-12

## 2018-05-14 RX ORDER — IBUPROFEN 600 MG/1
600 TABLET ORAL 2 TIMES DAILY
Qty: 60 TABLET | Refills: 0 | Status: SHIPPED | OUTPATIENT
Start: 2018-05-14 | End: 2022-10-12

## 2018-05-14 RX ADMIN — IBUPROFEN 600 MG: 600 TABLET ORAL at 08:57

## 2018-05-14 RX ADMIN — AMLODIPINE BESYLATE 5 MG: 5 TABLET ORAL at 08:57

## 2018-05-14 RX ADMIN — BENZTROPINE MESYLATE 2 MG: 2 TABLET ORAL at 08:57

## 2018-05-14 RX ADMIN — BUSPIRONE HYDROCHLORIDE 10 MG: 10 TABLET ORAL at 13:12

## 2018-05-14 RX ADMIN — MISOPROSTOL 200 MCG: 200 TABLET ORAL at 08:57

## 2018-05-14 RX ADMIN — BUPROPION HYDROCHLORIDE 150 MG: 150 TABLET, FILM COATED, EXTENDED RELEASE ORAL at 08:57

## 2018-05-14 RX ADMIN — BUSPIRONE HYDROCHLORIDE 10 MG: 10 TABLET ORAL at 08:57

## 2018-05-14 ASSESSMENT — PAIN SCALES - GENERAL: PAINLEVEL_OUTOF10: 9

## 2020-12-03 ENCOUNTER — TELEPHONE (OUTPATIENT)
Dept: ADMINISTRATIVE | Age: 55
End: 2020-12-03

## 2020-12-07 ENCOUNTER — OFFICE VISIT (OUTPATIENT)
Dept: PRIMARY CARE CLINIC | Age: 55
End: 2020-12-07
Payer: MEDICAID

## 2020-12-07 VITALS
SYSTOLIC BLOOD PRESSURE: 128 MMHG | DIASTOLIC BLOOD PRESSURE: 100 MMHG | HEART RATE: 99 BPM | RESPIRATION RATE: 18 BRPM | TEMPERATURE: 99.1 F | BODY MASS INDEX: 41.35 KG/M2 | WEIGHT: 280 LBS | OXYGEN SATURATION: 95 %

## 2020-12-07 DIAGNOSIS — J01.90 ACUTE BACTERIAL SINUSITIS: ICD-10-CM

## 2020-12-07 DIAGNOSIS — B96.89 ACUTE BACTERIAL SINUSITIS: ICD-10-CM

## 2020-12-07 LAB
Lab: NORMAL
QC PASS/FAIL: NORMAL
SARS-COV-2, POC: NORMAL

## 2020-12-07 PROCEDURE — G8427 DOCREV CUR MEDS BY ELIG CLIN: HCPCS | Performed by: PHYSICIAN ASSISTANT

## 2020-12-07 PROCEDURE — 3017F COLORECTAL CA SCREEN DOC REV: CPT | Performed by: PHYSICIAN ASSISTANT

## 2020-12-07 PROCEDURE — 4004F PT TOBACCO SCREEN RCVD TLK: CPT | Performed by: PHYSICIAN ASSISTANT

## 2020-12-07 PROCEDURE — 99213 OFFICE O/P EST LOW 20 MIN: CPT | Performed by: PHYSICIAN ASSISTANT

## 2020-12-07 PROCEDURE — G8484 FLU IMMUNIZE NO ADMIN: HCPCS | Performed by: PHYSICIAN ASSISTANT

## 2020-12-07 PROCEDURE — 87426 SARSCOV CORONAVIRUS AG IA: CPT | Performed by: PHYSICIAN ASSISTANT

## 2020-12-07 PROCEDURE — G8417 CALC BMI ABV UP PARAM F/U: HCPCS | Performed by: PHYSICIAN ASSISTANT

## 2020-12-07 RX ORDER — METHYLPREDNISOLONE 4 MG/1
TABLET ORAL
Qty: 1 KIT | Refills: 0 | Status: SHIPPED | OUTPATIENT
Start: 2020-12-07 | End: 2020-12-13

## 2020-12-07 RX ORDER — QUETIAPINE 300 MG/1
TABLET, FILM COATED, EXTENDED RELEASE ORAL
COMMUNITY
Start: 2020-11-02 | End: 2022-10-12

## 2020-12-07 RX ORDER — PHENOL 1.4 %
10 AEROSOL, SPRAY (ML) MUCOUS MEMBRANE NIGHTLY
COMMUNITY
Start: 2020-11-02

## 2020-12-07 RX ORDER — AMOXICILLIN AND CLAVULANATE POTASSIUM 875; 125 MG/1; MG/1
1 TABLET, FILM COATED ORAL 2 TIMES DAILY WITH MEALS
Qty: 20 TABLET | Refills: 0 | Status: SHIPPED | OUTPATIENT
Start: 2020-12-07 | End: 2020-12-17

## 2020-12-07 RX ORDER — BENZTROPINE MESYLATE 1 MG/1
TABLET ORAL
COMMUNITY
Start: 2020-11-02 | End: 2020-12-07 | Stop reason: ALTCHOICE
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
## 2020-12-08 LAB
SARS-COV-2: NOT DETECTED
SOURCE: NORMAL

## 2022-10-12 ENCOUNTER — HOSPITAL ENCOUNTER (INPATIENT)
Age: 57
LOS: 5 days | Discharge: HOME | DRG: 141 | End: 2022-10-17
Attending: EMERGENCY MEDICINE | Admitting: INTERNAL MEDICINE
Payer: MEDICAID

## 2022-10-12 ENCOUNTER — APPOINTMENT (OUTPATIENT)
Dept: GENERAL RADIOLOGY | Age: 57
DRG: 141 | End: 2022-10-12
Payer: MEDICAID

## 2022-10-12 ENCOUNTER — APPOINTMENT (OUTPATIENT)
Dept: CT IMAGING | Age: 57
DRG: 141 | End: 2022-10-12
Payer: MEDICAID

## 2022-10-12 DIAGNOSIS — I31.39 PERICARDIAL EFFUSION: ICD-10-CM

## 2022-10-12 DIAGNOSIS — J45.31 MILD PERSISTENT ASTHMA WITH EXACERBATION: Primary | ICD-10-CM

## 2022-10-12 DIAGNOSIS — I10 HYPERTENSION, UNSPECIFIED TYPE: ICD-10-CM

## 2022-10-12 PROBLEM — F14.21 HISTORY OF COCAINE DEPENDENCE (HCC): Status: ACTIVE | Noted: 2022-10-12

## 2022-10-12 PROBLEM — E66.01 MORBID OBESITY WITH BMI OF 40.0-44.9, ADULT (HCC): Status: ACTIVE | Noted: 2022-10-12

## 2022-10-12 PROBLEM — I16.0 HYPERTENSIVE URGENCY: Status: ACTIVE | Noted: 2022-10-12

## 2022-10-12 LAB
ADENOVIRUS BY PCR: NOT DETECTED
AMPHETAMINE SCREEN, URINE: NOT DETECTED
ANION GAP SERPL CALCULATED.3IONS-SCNC: 9 MMOL/L (ref 7–16)
BARBITURATE SCREEN URINE: NOT DETECTED
BASOPHILS ABSOLUTE: 0.01 E9/L (ref 0–0.2)
BASOPHILS RELATIVE PERCENT: 0.1 % (ref 0–2)
BENZODIAZEPINE SCREEN, URINE: NOT DETECTED
BORDETELLA PARAPERTUSSIS BY PCR: NOT DETECTED
BORDETELLA PERTUSSIS BY PCR: NOT DETECTED
BUN BLDV-MCNC: 4 MG/DL (ref 6–20)
CALCIUM SERPL-MCNC: 8.5 MG/DL (ref 8.6–10.2)
CANNABINOID SCREEN URINE: NOT DETECTED
CHLAMYDOPHILIA PNEUMONIAE BY PCR: NOT DETECTED
CHLORIDE BLD-SCNC: 101 MMOL/L (ref 98–107)
CO2: 30 MMOL/L (ref 22–29)
COCAINE METABOLITE SCREEN URINE: POSITIVE
CORONAVIRUS 229E BY PCR: NOT DETECTED
CORONAVIRUS HKU1 BY PCR: NOT DETECTED
CORONAVIRUS NL63 BY PCR: NOT DETECTED
CORONAVIRUS OC43 BY PCR: NOT DETECTED
CREAT SERPL-MCNC: 0.8 MG/DL (ref 0.5–1)
D DIMER: 366 NG/ML DDU
EKG ATRIAL RATE: 73 BPM
EKG P AXIS: 49 DEGREES
EKG P-R INTERVAL: 144 MS
EKG Q-T INTERVAL: 452 MS
EKG QRS DURATION: 88 MS
EKG QTC CALCULATION (BAZETT): 497 MS
EKG R AXIS: 7 DEGREES
EKG T AXIS: 36 DEGREES
EKG VENTRICULAR RATE: 73 BPM
EOSINOPHILS ABSOLUTE: 0.24 E9/L (ref 0.05–0.5)
EOSINOPHILS RELATIVE PERCENT: 2.7 % (ref 0–6)
FENTANYL SCREEN, URINE: NOT DETECTED
FERRITIN: 32 NG/ML
GFR AFRICAN AMERICAN: >60
GFR NON-AFRICAN AMERICAN: >60 ML/MIN/1.73
GLUCOSE BLD-MCNC: 154 MG/DL (ref 74–99)
HCT VFR BLD CALC: 39.5 % (ref 34–48)
HEMOGLOBIN: 12.4 G/DL (ref 11.5–15.5)
HUMAN METAPNEUMOVIRUS BY PCR: NOT DETECTED
HUMAN RHINOVIRUS/ENTEROVIRUS BY PCR: DETECTED
IMMATURE GRANULOCYTES #: 0.02 E9/L
IMMATURE GRANULOCYTES %: 0.2 % (ref 0–5)
INFLUENZA A BY PCR: NOT DETECTED
INFLUENZA B BY PCR: NOT DETECTED
IRON SATURATION: 15 % (ref 15–50)
IRON: 42 MCG/DL (ref 37–145)
LV EF: 50 %
LVEF MODALITY: NORMAL
LYMPHOCYTES ABSOLUTE: 3.12 E9/L (ref 1.5–4)
LYMPHOCYTES RELATIVE PERCENT: 34.5 % (ref 20–42)
Lab: ABNORMAL
MCH RBC QN AUTO: 24.2 PG (ref 26–35)
MCHC RBC AUTO-ENTMCNC: 31.4 % (ref 32–34.5)
MCV RBC AUTO: 77.1 FL (ref 80–99.9)
METHADONE SCREEN, URINE: NOT DETECTED
MONOCYTES ABSOLUTE: 0.39 E9/L (ref 0.1–0.95)
MONOCYTES RELATIVE PERCENT: 4.3 % (ref 2–12)
MYCOPLASMA PNEUMONIAE BY PCR: NOT DETECTED
NEUTROPHILS ABSOLUTE: 5.26 E9/L (ref 1.8–7.3)
NEUTROPHILS RELATIVE PERCENT: 58.2 % (ref 43–80)
OPIATE SCREEN URINE: NOT DETECTED
OXYCODONE URINE: NOT DETECTED
PARAINFLUENZA VIRUS 1 BY PCR: NOT DETECTED
PARAINFLUENZA VIRUS 2 BY PCR: NOT DETECTED
PARAINFLUENZA VIRUS 3 BY PCR: NOT DETECTED
PARAINFLUENZA VIRUS 4 BY PCR: NOT DETECTED
PDW BLD-RTO: 17.8 FL (ref 11.5–15)
PHENCYCLIDINE SCREEN URINE: NOT DETECTED
PLATELET # BLD: 391 E9/L (ref 130–450)
PMV BLD AUTO: 10.7 FL (ref 7–12)
POTASSIUM REFLEX MAGNESIUM: 3.6 MMOL/L (ref 3.5–5)
PRO-BNP: 841 PG/ML (ref 0–125)
RBC # BLD: 5.12 E12/L (ref 3.5–5.5)
RESPIRATORY SYNCYTIAL VIRUS BY PCR: NOT DETECTED
SARS-COV-2, PCR: NOT DETECTED
SODIUM BLD-SCNC: 140 MMOL/L (ref 132–146)
TOTAL IRON BINDING CAPACITY: 285 MCG/DL (ref 250–450)
TROPONIN, HIGH SENSITIVITY: 10 NG/L (ref 0–9)
TROPONIN, HIGH SENSITIVITY: 12 NG/L (ref 0–9)
TROPONIN, HIGH SENSITIVITY: 8 NG/L (ref 0–9)
TROPONIN, HIGH SENSITIVITY: 8 NG/L (ref 0–9)
WBC # BLD: 9 E9/L (ref 4.5–11.5)

## 2022-10-12 PROCEDURE — 83550 IRON BINDING TEST: CPT

## 2022-10-12 PROCEDURE — 6370000000 HC RX 637 (ALT 250 FOR IP): Performed by: INTERNAL MEDICINE

## 2022-10-12 PROCEDURE — 83540 ASSAY OF IRON: CPT

## 2022-10-12 PROCEDURE — 96376 TX/PRO/DX INJ SAME DRUG ADON: CPT

## 2022-10-12 PROCEDURE — 36415 COLL VENOUS BLD VENIPUNCTURE: CPT

## 2022-10-12 PROCEDURE — 6370000000 HC RX 637 (ALT 250 FOR IP)

## 2022-10-12 PROCEDURE — 84484 ASSAY OF TROPONIN QUANT: CPT

## 2022-10-12 PROCEDURE — 83880 ASSAY OF NATRIURETIC PEPTIDE: CPT

## 2022-10-12 PROCEDURE — 85378 FIBRIN DEGRADE SEMIQUANT: CPT

## 2022-10-12 PROCEDURE — 80048 BASIC METABOLIC PNL TOTAL CA: CPT

## 2022-10-12 PROCEDURE — 2580000003 HC RX 258: Performed by: INTERNAL MEDICINE

## 2022-10-12 PROCEDURE — 93005 ELECTROCARDIOGRAM TRACING: CPT

## 2022-10-12 PROCEDURE — 93005 ELECTROCARDIOGRAM TRACING: CPT | Performed by: EMERGENCY MEDICINE

## 2022-10-12 PROCEDURE — 96375 TX/PRO/DX INJ NEW DRUG ADDON: CPT

## 2022-10-12 PROCEDURE — 85025 COMPLETE CBC W/AUTO DIFF WBC: CPT

## 2022-10-12 PROCEDURE — 2500000003 HC RX 250 WO HCPCS

## 2022-10-12 PROCEDURE — 80307 DRUG TEST PRSMV CHEM ANLYZR: CPT

## 2022-10-12 PROCEDURE — 96374 THER/PROPH/DIAG INJ IV PUSH: CPT

## 2022-10-12 PROCEDURE — 2060000000 HC ICU INTERMEDIATE R&B

## 2022-10-12 PROCEDURE — 6360000004 HC RX CONTRAST MEDICATION: Performed by: RADIOLOGY

## 2022-10-12 PROCEDURE — 6360000002 HC RX W HCPCS

## 2022-10-12 PROCEDURE — 71045 X-RAY EXAM CHEST 1 VIEW: CPT

## 2022-10-12 PROCEDURE — 71275 CT ANGIOGRAPHY CHEST: CPT

## 2022-10-12 PROCEDURE — 82728 ASSAY OF FERRITIN: CPT

## 2022-10-12 PROCEDURE — 99285 EMERGENCY DEPT VISIT HI MDM: CPT

## 2022-10-12 PROCEDURE — 94640 AIRWAY INHALATION TREATMENT: CPT

## 2022-10-12 PROCEDURE — 0202U NFCT DS 22 TRGT SARS-COV-2: CPT

## 2022-10-12 PROCEDURE — 94664 DEMO&/EVAL PT USE INHALER: CPT

## 2022-10-12 PROCEDURE — 93306 TTE W/DOPPLER COMPLETE: CPT

## 2022-10-12 RX ORDER — MIRTAZAPINE 15 MG/1
15 TABLET, FILM COATED ORAL NIGHTLY
COMMUNITY

## 2022-10-12 RX ORDER — MECOBALAMIN 5000 MCG
10 TABLET,DISINTEGRATING ORAL NIGHTLY
Status: DISCONTINUED | OUTPATIENT
Start: 2022-10-12 | End: 2022-10-17 | Stop reason: HOSPADM

## 2022-10-12 RX ORDER — MIRTAZAPINE 15 MG/1
15 TABLET, FILM COATED ORAL NIGHTLY
Status: DISCONTINUED | OUTPATIENT
Start: 2022-10-12 | End: 2022-10-17 | Stop reason: HOSPADM

## 2022-10-12 RX ORDER — CLONIDINE HYDROCHLORIDE 0.1 MG/1
0.1 TABLET ORAL EVERY 6 HOURS PRN
Status: DISCONTINUED | OUTPATIENT
Start: 2022-10-12 | End: 2022-10-17 | Stop reason: HOSPADM

## 2022-10-12 RX ORDER — LABETALOL HYDROCHLORIDE 5 MG/ML
10 INJECTION, SOLUTION INTRAVENOUS ONCE
Status: COMPLETED | OUTPATIENT
Start: 2022-10-12 | End: 2022-10-12

## 2022-10-12 RX ORDER — AMLODIPINE BESYLATE 5 MG/1
5 TABLET ORAL DAILY
Status: DISCONTINUED | OUTPATIENT
Start: 2022-10-12 | End: 2022-10-14

## 2022-10-12 RX ORDER — METHYLPREDNISOLONE SODIUM SUCCINATE 125 MG/2ML
125 INJECTION, POWDER, LYOPHILIZED, FOR SOLUTION INTRAMUSCULAR; INTRAVENOUS DAILY
Status: DISCONTINUED | OUTPATIENT
Start: 2022-10-12 | End: 2022-10-13

## 2022-10-12 RX ORDER — ACETAMINOPHEN 325 MG/1
650 TABLET ORAL EVERY 6 HOURS PRN
Status: DISCONTINUED | OUTPATIENT
Start: 2022-10-12 | End: 2022-10-17 | Stop reason: HOSPADM

## 2022-10-12 RX ORDER — HALOPERIDOL 0.5 MG/1
0.5 TABLET ORAL 2 TIMES DAILY
COMMUNITY

## 2022-10-12 RX ORDER — ACETAMINOPHEN 650 MG/1
650 SUPPOSITORY RECTAL EVERY 6 HOURS PRN
Status: DISCONTINUED | OUTPATIENT
Start: 2022-10-12 | End: 2022-10-17 | Stop reason: HOSPADM

## 2022-10-12 RX ORDER — IPRATROPIUM BROMIDE AND ALBUTEROL SULFATE 2.5; .5 MG/3ML; MG/3ML
1 SOLUTION RESPIRATORY (INHALATION)
Status: DISCONTINUED | OUTPATIENT
Start: 2022-10-12 | End: 2022-10-17 | Stop reason: HOSPADM

## 2022-10-12 RX ORDER — ONDANSETRON 4 MG/1
4 TABLET, ORALLY DISINTEGRATING ORAL EVERY 8 HOURS PRN
Status: DISCONTINUED | OUTPATIENT
Start: 2022-10-12 | End: 2022-10-17 | Stop reason: HOSPADM

## 2022-10-12 RX ORDER — ONDANSETRON 2 MG/ML
4 INJECTION INTRAMUSCULAR; INTRAVENOUS EVERY 6 HOURS PRN
Status: DISCONTINUED | OUTPATIENT
Start: 2022-10-12 | End: 2022-10-17 | Stop reason: HOSPADM

## 2022-10-12 RX ORDER — SODIUM CHLORIDE 9 MG/ML
INJECTION, SOLUTION INTRAVENOUS PRN
Status: DISCONTINUED | OUTPATIENT
Start: 2022-10-12 | End: 2022-10-17 | Stop reason: HOSPADM

## 2022-10-12 RX ORDER — LABETALOL HYDROCHLORIDE 5 MG/ML
5 INJECTION, SOLUTION INTRAVENOUS ONCE
Status: COMPLETED | OUTPATIENT
Start: 2022-10-12 | End: 2022-10-12

## 2022-10-12 RX ORDER — IPRATROPIUM BROMIDE AND ALBUTEROL SULFATE 2.5; .5 MG/3ML; MG/3ML
1 SOLUTION RESPIRATORY (INHALATION) ONCE
Status: DISCONTINUED | OUTPATIENT
Start: 2022-10-12 | End: 2022-10-12

## 2022-10-12 RX ORDER — BENZTROPINE MESYLATE 0.5 MG/1
0.5 TABLET ORAL 2 TIMES DAILY
Status: DISCONTINUED | OUTPATIENT
Start: 2022-10-12 | End: 2022-10-17 | Stop reason: HOSPADM

## 2022-10-12 RX ORDER — SODIUM CHLORIDE 0.9 % (FLUSH) 0.9 %
5-40 SYRINGE (ML) INJECTION EVERY 12 HOURS SCHEDULED
Status: DISCONTINUED | OUTPATIENT
Start: 2022-10-12 | End: 2022-10-17 | Stop reason: HOSPADM

## 2022-10-12 RX ORDER — HALOPERIDOL 0.5 MG/1
0.5 TABLET ORAL 2 TIMES DAILY
Status: DISCONTINUED | OUTPATIENT
Start: 2022-10-12 | End: 2022-10-17 | Stop reason: HOSPADM

## 2022-10-12 RX ORDER — SODIUM CHLORIDE 0.9 % (FLUSH) 0.9 %
10 SYRINGE (ML) INJECTION PRN
Status: DISCONTINUED | OUTPATIENT
Start: 2022-10-12 | End: 2022-10-17 | Stop reason: HOSPADM

## 2022-10-12 RX ORDER — BENZTROPINE MESYLATE 0.5 MG/1
0.5 TABLET ORAL 2 TIMES DAILY
COMMUNITY

## 2022-10-12 RX ADMIN — BENZTROPINE MESYLATE 0.5 MG: 0.5 TABLET ORAL at 20:41

## 2022-10-12 RX ADMIN — LABETALOL HYDROCHLORIDE 10 MG: 5 INJECTION INTRAVENOUS at 15:34

## 2022-10-12 RX ADMIN — LABETALOL HYDROCHLORIDE 5 MG: 5 INJECTION INTRAVENOUS at 11:50

## 2022-10-12 RX ADMIN — CLONIDINE HYDROCHLORIDE 0.1 MG: 0.1 TABLET ORAL at 22:25

## 2022-10-12 RX ADMIN — IPRATROPIUM BROMIDE AND ALBUTEROL SULFATE 1 AMPULE: .5; 2.5 SOLUTION RESPIRATORY (INHALATION) at 12:47

## 2022-10-12 RX ADMIN — METHYLPREDNISOLONE SODIUM SUCCINATE 125 MG: 125 INJECTION, POWDER, FOR SOLUTION INTRAMUSCULAR; INTRAVENOUS at 11:50

## 2022-10-12 RX ADMIN — LABETALOL HYDROCHLORIDE 10 MG: 5 INJECTION INTRAVENOUS at 14:52

## 2022-10-12 RX ADMIN — SODIUM CHLORIDE, PRESERVATIVE FREE 10 ML: 5 INJECTION INTRAVENOUS at 20:41

## 2022-10-12 RX ADMIN — IOPAMIDOL 75 ML: 755 INJECTION, SOLUTION INTRAVENOUS at 13:25

## 2022-10-12 RX ADMIN — ACETAMINOPHEN 650 MG: 325 TABLET, FILM COATED ORAL at 20:45

## 2022-10-12 RX ADMIN — HALOPERIDOL 0.5 MG: 0.5 TABLET ORAL at 20:41

## 2022-10-12 RX ADMIN — IPRATROPIUM BROMIDE AND ALBUTEROL SULFATE 1 AMPULE: .5; 2.5 SOLUTION RESPIRATORY (INHALATION) at 16:12

## 2022-10-12 RX ADMIN — CARIPRAZINE 1.5 MG: 1.5 CAPSULE, GELATIN COATED ORAL at 20:41

## 2022-10-12 RX ADMIN — Medication 10 MG: at 20:41

## 2022-10-12 RX ADMIN — AMLODIPINE BESYLATE 5 MG: 5 TABLET ORAL at 16:45

## 2022-10-12 RX ADMIN — IPRATROPIUM BROMIDE AND ALBUTEROL SULFATE 1 AMPULE: .5; 2.5 SOLUTION RESPIRATORY (INHALATION) at 20:23

## 2022-10-12 RX ADMIN — MIRTAZAPINE 15 MG: 15 TABLET, FILM COATED ORAL at 20:41

## 2022-10-12 ASSESSMENT — PAIN SCALES - GENERAL: PAINLEVEL_OUTOF10: 7

## 2022-10-12 ASSESSMENT — PAIN DESCRIPTION - LOCATION: LOCATION: HEAD

## 2022-10-12 ASSESSMENT — ENCOUNTER SYMPTOMS
ABDOMINAL DISTENTION: 0
CONSTIPATION: 0
BACK PAIN: 0
SINUS PRESSURE: 0
PHOTOPHOBIA: 0
WHEEZING: 0
RHINORRHEA: 0
NAUSEA: 0
SHORTNESS OF BREATH: 1
EYE REDNESS: 0
SORE THROAT: 0
VOMITING: 0
COUGH: 1
CHEST TIGHTNESS: 1
ABDOMINAL PAIN: 0
EYE DISCHARGE: 0
BLOOD IN STOOL: 0
DIARRHEA: 0

## 2022-10-12 NOTE — ED NOTES
The following labs were labeled with appropriate pt sticker and tubed to lab:     [x] Blue     [x] Lavender   [] on ice  [x] Green/yellow  [] Green/black [] on ice  [] Anthony Crate  [] on ice  [] Yellow  [] Red  [] Pink  [] ABG  [] VBG    [] COVID-19 swab    [] Rapid  [] PCR  [] Flu swab  [] Peds Viral Panel     [] Urine Sample  [] Pelvic Cultures  [] Blood Cultures  [] X 2  [] STREP Cultures         [facility], RN  10/12/22 1642

## 2022-10-12 NOTE — ED NOTES
Pt resting in position of comfort on cot presenting in no acute/ apparent distress (NAD). Respirations are noted even and unlabored with good rise and fall of the chest observed. Pt updated with current plan of care (POC) and all questions/ concerns addressed. Patient voices no needs at this time. Cot noted in lowest position, locked, with side rails X 2 up for patient safety. Will continue to monitor patient for acute changes.       [x] Side rails up    [x] Cart in lowest position    [] Family at bedside    [x] Call light within reach           [facility], RN  10/12/22 [id number]

## 2022-10-12 NOTE — ED NOTES
Report called to  PICU for patient transfer to room 5335E. No questions or concerns voiced/ expressed.       Stone Montiel RN  10/12/22 [id number]
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
## 2022-10-12 NOTE — ED NOTES
The following labs were labeled with appropriate pt sticker and tubed to lab:     [] Blue     [] Lavender   [] on ice  [] Green/yellow  [] Green/black [] on ice  [] Justine Corina  [] on ice  [] Yellow  [] Red  [] Pink  [] ABG  [] VBG    [] COVID-19 swab    [] Rapid  [] PCR  [] Flu swab  [] Peds Viral Panel     [x] Urine Sample  [] Pelvic Cultures  [] Blood Cultures  [] X 2  [] STREP Cultures         Braulio Campos RN  10/12/22 [id number]

## 2022-10-13 LAB
ANION GAP SERPL CALCULATED.3IONS-SCNC: 13 MMOL/L (ref 7–16)
BUN BLDV-MCNC: 10 MG/DL (ref 6–20)
CALCIUM SERPL-MCNC: 9 MG/DL (ref 8.6–10.2)
CHLORIDE BLD-SCNC: 102 MMOL/L (ref 98–107)
CHOLESTEROL, TOTAL: 245 MG/DL (ref 0–199)
CO2: 25 MMOL/L (ref 22–29)
CREAT SERPL-MCNC: 0.8 MG/DL (ref 0.5–1)
GFR AFRICAN AMERICAN: >60
GFR NON-AFRICAN AMERICAN: >60 ML/MIN/1.73
GLUCOSE BLD-MCNC: 136 MG/DL (ref 74–99)
HBA1C MFR BLD: 6.2 % (ref 4–5.6)
HCT VFR BLD CALC: 41 % (ref 34–48)
HDLC SERPL-MCNC: 55 MG/DL
HEMOGLOBIN: 13.1 G/DL (ref 11.5–15.5)
LDL CHOLESTEROL CALCULATED: 172 MG/DL (ref 0–99)
MCH RBC QN AUTO: 24.6 PG (ref 26–35)
MCHC RBC AUTO-ENTMCNC: 32 % (ref 32–34.5)
MCV RBC AUTO: 77.1 FL (ref 80–99.9)
PDW BLD-RTO: 17.7 FL (ref 11.5–15)
PLATELET # BLD: 418 E9/L (ref 130–450)
PMV BLD AUTO: 10.7 FL (ref 7–12)
POTASSIUM REFLEX MAGNESIUM: 4 MMOL/L (ref 3.5–5)
PROCALCITONIN: <0.02 NG/ML (ref 0–0.08)
RBC # BLD: 5.32 E12/L (ref 3.5–5.5)
SODIUM BLD-SCNC: 140 MMOL/L (ref 132–146)
TRIGL SERPL-MCNC: 89 MG/DL (ref 0–149)
VLDLC SERPL CALC-MCNC: 18 MG/DL
WBC # BLD: 12 E9/L (ref 4.5–11.5)

## 2022-10-13 PROCEDURE — 6360000002 HC RX W HCPCS

## 2022-10-13 PROCEDURE — 93005 ELECTROCARDIOGRAM TRACING: CPT | Performed by: INTERNAL MEDICINE

## 2022-10-13 PROCEDURE — 2580000003 HC RX 258: Performed by: INTERNAL MEDICINE

## 2022-10-13 PROCEDURE — 80048 BASIC METABOLIC PNL TOTAL CA: CPT

## 2022-10-13 PROCEDURE — 2060000000 HC ICU INTERMEDIATE R&B

## 2022-10-13 PROCEDURE — 99223 1ST HOSP IP/OBS HIGH 75: CPT | Performed by: INTERNAL MEDICINE

## 2022-10-13 PROCEDURE — 80061 LIPID PANEL: CPT

## 2022-10-13 PROCEDURE — 6370000000 HC RX 637 (ALT 250 FOR IP)

## 2022-10-13 PROCEDURE — 94640 AIRWAY INHALATION TREATMENT: CPT

## 2022-10-13 PROCEDURE — 83036 HEMOGLOBIN GLYCOSYLATED A1C: CPT

## 2022-10-13 PROCEDURE — 85027 COMPLETE CBC AUTOMATED: CPT

## 2022-10-13 PROCEDURE — 36415 COLL VENOUS BLD VENIPUNCTURE: CPT

## 2022-10-13 PROCEDURE — 84145 PROCALCITONIN (PCT): CPT

## 2022-10-13 PROCEDURE — 6370000000 HC RX 637 (ALT 250 FOR IP): Performed by: INTERNAL MEDICINE

## 2022-10-13 RX ORDER — METHYLPREDNISOLONE SODIUM SUCCINATE 40 MG/ML
40 INJECTION, POWDER, LYOPHILIZED, FOR SOLUTION INTRAMUSCULAR; INTRAVENOUS EVERY 8 HOURS
Status: DISCONTINUED | OUTPATIENT
Start: 2022-10-14 | End: 2022-10-15

## 2022-10-13 RX ORDER — PANTOPRAZOLE SODIUM 40 MG/1
40 TABLET, DELAYED RELEASE ORAL
Status: DISCONTINUED | OUTPATIENT
Start: 2022-10-14 | End: 2022-10-17 | Stop reason: HOSPADM

## 2022-10-13 RX ORDER — COLCHICINE 0.6 MG/1
0.6 TABLET ORAL DAILY
Status: DISCONTINUED | OUTPATIENT
Start: 2022-10-13 | End: 2022-10-17 | Stop reason: HOSPADM

## 2022-10-13 RX ADMIN — BENZTROPINE MESYLATE 0.5 MG: 0.5 TABLET ORAL at 21:22

## 2022-10-13 RX ADMIN — IPRATROPIUM BROMIDE AND ALBUTEROL SULFATE 1 AMPULE: .5; 2.5 SOLUTION RESPIRATORY (INHALATION) at 20:23

## 2022-10-13 RX ADMIN — IPRATROPIUM BROMIDE AND ALBUTEROL SULFATE 1 AMPULE: .5; 2.5 SOLUTION RESPIRATORY (INHALATION) at 16:10

## 2022-10-13 RX ADMIN — ACETAMINOPHEN 650 MG: 325 TABLET, FILM COATED ORAL at 03:11

## 2022-10-13 RX ADMIN — HALOPERIDOL 0.5 MG: 0.5 TABLET ORAL at 12:02

## 2022-10-13 RX ADMIN — CLONIDINE HYDROCHLORIDE 0.1 MG: 0.1 TABLET ORAL at 08:23

## 2022-10-13 RX ADMIN — Medication 10 MG: at 20:02

## 2022-10-13 RX ADMIN — SODIUM CHLORIDE, PRESERVATIVE FREE 10 ML: 5 INJECTION INTRAVENOUS at 21:22

## 2022-10-13 RX ADMIN — ACETAMINOPHEN 650 MG: 325 TABLET, FILM COATED ORAL at 10:35

## 2022-10-13 RX ADMIN — BENZTROPINE MESYLATE 0.5 MG: 0.5 TABLET ORAL at 12:01

## 2022-10-13 RX ADMIN — CARIPRAZINE 1.5 MG: 1.5 CAPSULE, GELATIN COATED ORAL at 12:02

## 2022-10-13 RX ADMIN — IPRATROPIUM BROMIDE AND ALBUTEROL SULFATE 1 AMPULE: .5; 2.5 SOLUTION RESPIRATORY (INHALATION) at 08:27

## 2022-10-13 RX ADMIN — SODIUM CHLORIDE, PRESERVATIVE FREE 10 ML: 5 INJECTION INTRAVENOUS at 08:23

## 2022-10-13 RX ADMIN — AMLODIPINE BESYLATE 5 MG: 5 TABLET ORAL at 08:23

## 2022-10-13 RX ADMIN — MIRTAZAPINE 15 MG: 15 TABLET, FILM COATED ORAL at 20:02

## 2022-10-13 RX ADMIN — METHYLPREDNISOLONE SODIUM SUCCINATE 125 MG: 125 INJECTION, POWDER, FOR SOLUTION INTRAMUSCULAR; INTRAVENOUS at 08:23

## 2022-10-13 RX ADMIN — IPRATROPIUM BROMIDE AND ALBUTEROL SULFATE 1 AMPULE: .5; 2.5 SOLUTION RESPIRATORY (INHALATION) at 12:15

## 2022-10-13 RX ADMIN — CLONIDINE HYDROCHLORIDE 0.1 MG: 0.1 TABLET ORAL at 20:02

## 2022-10-13 RX ADMIN — HALOPERIDOL 0.5 MG: 0.5 TABLET ORAL at 21:22

## 2022-10-13 RX ADMIN — COLCHICINE 0.6 MG: 0.6 TABLET, FILM COATED ORAL at 17:50

## 2022-10-13 ASSESSMENT — PAIN DESCRIPTION - PAIN TYPE: TYPE: ACUTE PAIN

## 2022-10-13 ASSESSMENT — PAIN DESCRIPTION - LOCATION
LOCATION: HEAD
LOCATION: HEAD

## 2022-10-13 ASSESSMENT — PAIN DESCRIPTION - FREQUENCY: FREQUENCY: INTERMITTENT

## 2022-10-13 ASSESSMENT — PAIN - FUNCTIONAL ASSESSMENT: PAIN_FUNCTIONAL_ASSESSMENT: PREVENTS OR INTERFERES SOME ACTIVE ACTIVITIES AND ADLS

## 2022-10-13 ASSESSMENT — PAIN DESCRIPTION - DESCRIPTORS
DESCRIPTORS: ACHING;DISCOMFORT;SORE
DESCRIPTORS: OTHER (COMMENT)

## 2022-10-13 ASSESSMENT — PAIN SCALES - GENERAL
PAINLEVEL_OUTOF10: 5
PAINLEVEL_OUTOF10: 7
PAINLEVEL_OUTOF10: 0

## 2022-10-13 ASSESSMENT — PAIN DESCRIPTION - ONSET: ONSET: GRADUAL

## 2022-10-13 ASSESSMENT — PAIN DESCRIPTION - ORIENTATION: ORIENTATION: RIGHT;LEFT

## 2022-10-13 NOTE — PROGRESS NOTES
Hospitalist Progress Note      Synopsis: Patient admitted on 10/12/2022 with chest pain. Patient with troponin of 10-->8. EKG with NSR. Patient with elevated D-dimer. CTA performed that showed no PE but a moderated pericardial effusion with possible  tamponade physiology. STAT ECHO was done that showed EF of 50%, no WMA, Grade 2 Diastolic dysfunction. There was a small to moderate pericardial effusion and a trace posterior effusion with possible pre tamponade physiology. CT surgery and cardiology consulted. Subjective    Patient still with cough and some shortness of breath. Still has wheezing. States that it is about the same as yesterday. Exam:  BP (!) 160/91   Pulse 88   Temp 96.8 °F (36 °C) (Temporal)   Resp 20   SpO2 93%   General appearance: No apparent distress, appears stated age and cooperative. HEENT: Pupils equal, round, and reactive to light. Conjunctivae/corneas clear. Neck: Supple. No jugular venous distention. Trachea midline. Respiratory:  Normal respiratory effort. Expiratory wheezes in the bilateral lower lobes. Cardiovascular: Regular rate and rhythm with normal S1/S2 without murmurs, rubs or gallops. Abdomen: Soft, non-tender, non-distended with normal bowel sounds. Musculoskeletal: No clubbing, cyanosis or edema bilaterally. Brisk capillary refill. 2+ lower extremity pulses (dorsalis pedis).    Skin:  No rashes    Neurologic: awake, alert and following commands     Medications:  Reviewed    Infusion Medications    sodium chloride       Scheduled Medications    ipratropium-albuterol  1 ampule Inhalation Q4H WA    methylPREDNISolone  125 mg IntraVENous Daily    benztropine  0.5 mg Oral BID    cariprazine hcl  1.5 mg Oral Daily    haloperidol  0.5 mg Oral BID    melatonin  10 mg Oral Nightly    mirtazapine  15 mg Oral Nightly    sodium chloride flush  5-40 mL IntraVENous 2 times per day    amLODIPine  5 mg Oral Daily     PRN Meds: perflutren lipid microspheres, sodium chloride flush, sodium chloride, ondansetron **OR** ondansetron, acetaminophen **OR** acetaminophen, magnesium hydroxide, cloNIDine    I/O  No intake or output data in the 24 hours ending 10/13/22 1055    Labs:   Recent Labs     10/12/22  1128 10/13/22  0836   WBC 9.0 12.0*   HGB 12.4 13.1   HCT 39.5 41.0    418       Recent Labs     10/12/22  1128 10/13/22  0836    140   K 3.6 4.0    102   CO2 30* 25   BUN 4* 10   CREATININE 0.8 0.8   CALCIUM 8.5* 9.0       No results for input(s): PROT, ALB, ALKPHOS, ALT, AST, BILITOT, AMYLASE, LIPASE in the last 72 hours. No results for input(s): INR in the last 72 hours. No results for input(s): Donne Gardner in the last 72 hours. Chronic labs:  Lab Results   Component Value Date    CHOL 245 (H) 10/13/2022    TRIG 89 10/13/2022    HDL 55 10/13/2022    LDLCALC 172 (H) 10/13/2022    TSH 0.966 05/05/2018    LABA1C 6.2 (H) 10/13/2022       Radiology:  Imaging studies reviewed today. ASSESSMENT AND PLAN:  Pericardial effusion  -CT surgery consulted  -Cardiology consulted  -Monitor on telemetry    Asthma Exacerbation/Rhinovirus  Check procal  IV steroids  Supportive care  Breathing treatments   Supplemental 02 if needed. Patient on room air     HTN  Norvasc,prn meds    Schizo-affective schizophrenia   -Home medications    Cocaine abuse  Avoid Beta blockers         Diet: ADULT DIET; Regular; No Caffeine  Code Status: Full Code      DISPOSITION: Corazon needs to be medically inpatient for CTS surgery and cardiology    +++++++++++++++++++++++++++++++++++++++++++++++++  Brodie Villatoro, [facility].  +++++++++++++++++++++++++++++++++++++++++++++++++  NOTE: This report was transcribed using voice recognition software. Every effort was made to ensure accuracy; however, inadvertent computerized transcription errors may be present.

## 2022-10-13 NOTE — PROGRESS NOTES
Dr Ambrose Murcia notified of new consult while rounding on floor     Kel Holman RN   10/13/22  10:59 AM

## 2022-10-13 NOTE — CONSULTS
Ade Ojeda  1965  Date of Service: 10/13/2022    Reason for Consultation: We were asked to see Ade Ojeda by Dr. Stacie Alexander DO  regarding pericardial effusion. History of Chief Complaint: This is a 49 y.o. female not previously known to our group. They have a history of schizoaffective disorder, anxiety/depression, morbidly obese, asthma, she continues to smoke, she continues to use cocaine and marijuana and is cocaine positive this admission. She presented with fevers cough, runny nose, shortness of breath, and orthopnea for several days. She denies any chest pain. Author Sudarshan REVIEW OF SYSTEMS:   Heart: as above   Lungs: as above   Eyes: denies changes in vision or discharge. Ears: denies changes in hearing or pain. Nose: denies epistaxis or masses   Throat: denies sore throat or trouble swallowing. Neuro: denies numbness, tingling, tremors. Skin: denies rashes or itching. : denies hematuria, dysuria   GI: denies vomiting, diarrhea   Psych: denies mood changed, anxiety, depression.     CURRENT MEDICATIONS:  Current Facility-Administered Medications   Medication Dose Route Frequency Provider Last Rate Last Admin    colchicine (COLCRYS) tablet 0.6 mg  0.6 mg Oral Daily Val Judge DO        perflutren lipid microspheres (DEFINITY) injection 1.65 mg  1.5 mL IntraVENous ONCE PRN Val Judge DO        [START ON 10/14/2022] methylPREDNISolone sodium (SOLU-MEDROL) injection 40 mg  40 mg IntraVENous Q8H Ravi Baptiste DO        [START ON 10/14/2022] pantoprazole (PROTONIX) tablet 40 mg  40 mg Oral QAM RE Dc DO        ipratropium-albuterol (DUONEB) nebulizer solution 1 ampule  1 ampule Inhalation Q4H WA Tramaine Martinez MD   1 ampule at 10/13/22 1610    perflutren lipid microspheres (DEFINITY) injection 1.65 mg  1.5 mL IntraVENous ONCE PRN Tramaine Martinez MD        benztropine (COGENTIN) tablet 0.5 mg  0.5 mg Oral BID Akila Tapia MD on file. SOCIAL HISTORY:  Social History     Socioeconomic History    Marital status: Single   Tobacco Use    Smoking status: Every Day     Packs/day: 0.50     Years: 20.00     Pack years: 10.00     Types: Cigarettes    Smokeless tobacco: Never   Substance and Sexual Activity    Alcohol use: No    Drug use: Yes     Types: Cocaine, Marijuana (Weed)     Comment: marijuana 3 weeks ago +cocaine 1/4/17       PHYSICAL EXAM:  Vitals:    10/13/22 0827 10/13/22 1030 10/13/22 1215 10/13/22 1422   BP:  (!) 160/91  (!) 141/79   Pulse: 88  87 90   Resp: 20 20 19   Temp:    97.2 °F (36.2 °C)   TempSrc:    Temporal   SpO2: 93%  98% 92%       GENERAL:  He is alert and oriented x 3, communicates well, in no distress. NECK:  No masses, trachea is mid position. Supple, full ROM, no JVD or bruits. No palpable thyromegaly or lymphadenopathy. HEART:  Regular rate and rhythm. Normal S1 and S2. There are no abnormal murmurs. No heaves. LUNGS: Poor air movement. Diffuse bilateral wheezing. No use of accessory muscles. ABDOMEN: Obese. Soft, non-tender. Normal bowel sounds. EXTREMITIES:  Full ROM x4. No bilateral lower extremity edema. Good distal pulses. EYES:  PERRL, normal lids & conjunctiva. No icterus. ENT: no external masses, no bleeding. Moist mucosa. Normal lips formation. NEURO: Full ROM x 4, EOMI, no tremors. SKIN:  Warm, dry and intact. Normal turgor, no petechia. Phych: alert & oriented x3. Normal  Judgement & insight. Currently not agitated or anxious. EKG: Sinus rhythm, 73 bpm, nl axis, nonspecific ST - T wave changes. Labs:  Recent Labs     10/12/22  1128 10/13/22  0836   WBC 9.0 12.0*   HGB 12.4 13.1   HCT 39.5 41.0   MCV 77.1* 77.1*    418     Recent Labs     10/12/22  1128 10/13/22  0836    140   K 3.6 4.0    102   CO2 30* 25   BUN 4* 10   CREATININE 0.8 0.8     No results for input(s): PROTIME, INR in the last 72 hours.   No results for input(s): CKTOTAL, CKMB, Lucio Chase in the last 72 hours. No results for input(s): BNP in the last 72 hours. Recent Labs     10/13/22  0836   CHOL 245*   HDL 55   TRIG 89     Recent Labs     10/12/22  1236 10/12/22  1827 10/12/22  2110   TROPHS 10* 8 8       Assessment:   Mild to moderate size pericardial effusion. No clinical tamponade. Early signs of hemodynamic significance reported but no tamponade on her echocardiogram.  Most likely viral etiology. Troponin negative. No myocarditis. Rhinovirus URI. Exacerbation of COPD/asthma. Active cocaine use. Schizoaffective disorder, anxiety/depression. Morbid obesity. Recommendations:  Stop smoking and using drugs. No beta-blockers. Solu-Medrol has been started. I will add colchicine. Follow-up with serial echoes. Repeat first limited echo Friday. Thank you for allowing me to participate in your patient's care. [address] [facility] [place], [address] [facility]  Interventional Cardiology    Note: This report was completed using computerized voice recognition software. Every effort has been made to ensure accuracy, however; and invert and computerized transcription errors may be present.

## 2022-10-13 NOTE — CARE COORDINATION
Pt needs clearance form CT surgery and cardiology before going to [facility]. Pt did not answer room phone. Attempted to call Parent  Garrison Epley but number was disconnected. .  Per chart Pt is from home and was independent. Discharge Plan is undetermined at this time until Consult give recommendations. SW/KENZIE to follow for discharge needs.    ADRIANA Redding.  [phone]

## 2022-10-13 NOTE — PLAN OF CARE
Problem: Discharge Planning  Goal: Discharge to home or other facility with appropriate resources  Outcome: Progressing     Problem: Respiratory - Adult  Goal: Achieves optimal ventilation and oxygenation  10/13/2022 1027 by Andrea Reyes RN  Outcome: Progressing  10/12/2022 2210 by Jodean Habermann, RN  Outcome: Progressing     Problem: Pain  Goal: Verbalizes/displays adequate comfort level or baseline comfort level  Outcome: Progressing     Problem: Safety - Adult  Goal: Free from fall injury  Outcome: Progressing

## 2022-10-14 LAB
ALBUMIN SERPL-MCNC: 3.5 G/DL (ref 3.5–5.2)
ALP BLD-CCNC: 114 U/L (ref 35–104)
ALT SERPL-CCNC: 11 U/L (ref 0–32)
ANION GAP SERPL CALCULATED.3IONS-SCNC: 11 MMOL/L (ref 7–16)
AST SERPL-CCNC: 13 U/L (ref 0–31)
BASOPHILS ABSOLUTE: 0.01 E9/L (ref 0–0.2)
BASOPHILS RELATIVE PERCENT: 0.1 % (ref 0–2)
BILIRUB SERPL-MCNC: 0.3 MG/DL (ref 0–1.2)
BUN BLDV-MCNC: 14 MG/DL (ref 6–20)
CALCIUM SERPL-MCNC: 8.6 MG/DL (ref 8.6–10.2)
CHLORIDE BLD-SCNC: 102 MMOL/L (ref 98–107)
CO2: 24 MMOL/L (ref 22–29)
CREAT SERPL-MCNC: 0.8 MG/DL (ref 0.5–1)
EKG ATRIAL RATE: 72 BPM
EKG ATRIAL RATE: 83 BPM
EKG ATRIAL RATE: 84 BPM
EKG P AXIS: 36 DEGREES
EKG P AXIS: 55 DEGREES
EKG P AXIS: 59 DEGREES
EKG P-R INTERVAL: 144 MS
EKG P-R INTERVAL: 146 MS
EKG P-R INTERVAL: 150 MS
EKG Q-T INTERVAL: 404 MS
EKG Q-T INTERVAL: 416 MS
EKG Q-T INTERVAL: 434 MS
EKG QRS DURATION: 88 MS
EKG QRS DURATION: 88 MS
EKG QRS DURATION: 94 MS
EKG QTC CALCULATION (BAZETT): 475 MS
EKG QTC CALCULATION (BAZETT): 477 MS
EKG QTC CALCULATION (BAZETT): 488 MS
EKG R AXIS: -4 DEGREES
EKG R AXIS: 19 DEGREES
EKG R AXIS: 28 DEGREES
EKG T AXIS: 18 DEGREES
EKG T AXIS: 22 DEGREES
EKG T AXIS: 32 DEGREES
EKG VENTRICULAR RATE: 72 BPM
EKG VENTRICULAR RATE: 83 BPM
EKG VENTRICULAR RATE: 84 BPM
EOSINOPHILS ABSOLUTE: 0 E9/L (ref 0.05–0.5)
EOSINOPHILS RELATIVE PERCENT: 0 % (ref 0–6)
GFR AFRICAN AMERICAN: >60
GFR NON-AFRICAN AMERICAN: >60 ML/MIN/1.73
GLUCOSE BLD-MCNC: 106 MG/DL (ref 74–99)
HCT VFR BLD CALC: 39.6 % (ref 34–48)
HEMOGLOBIN: 12.6 G/DL (ref 11.5–15.5)
IMMATURE GRANULOCYTES #: 0.04 E9/L
IMMATURE GRANULOCYTES %: 0.3 % (ref 0–5)
LYMPHOCYTES ABSOLUTE: 4.52 E9/L (ref 1.5–4)
LYMPHOCYTES RELATIVE PERCENT: 36.2 % (ref 20–42)
MCH RBC QN AUTO: 24.1 PG (ref 26–35)
MCHC RBC AUTO-ENTMCNC: 31.8 % (ref 32–34.5)
MCV RBC AUTO: 75.9 FL (ref 80–99.9)
MONOCYTES ABSOLUTE: 0.76 E9/L (ref 0.1–0.95)
MONOCYTES RELATIVE PERCENT: 6.1 % (ref 2–12)
NEUTROPHILS ABSOLUTE: 7.17 E9/L (ref 1.8–7.3)
NEUTROPHILS RELATIVE PERCENT: 57.3 % (ref 43–80)
PDW BLD-RTO: 17.6 FL (ref 11.5–15)
PLATELET # BLD: 408 E9/L (ref 130–450)
PMV BLD AUTO: 10.7 FL (ref 7–12)
POTASSIUM REFLEX MAGNESIUM: 3.7 MMOL/L (ref 3.5–5)
RBC # BLD: 5.22 E12/L (ref 3.5–5.5)
SODIUM BLD-SCNC: 137 MMOL/L (ref 132–146)
TOTAL PROTEIN: 7.2 G/DL (ref 6.4–8.3)
WBC # BLD: 12.5 E9/L (ref 4.5–11.5)

## 2022-10-14 PROCEDURE — 2580000003 HC RX 258: Performed by: INTERNAL MEDICINE

## 2022-10-14 PROCEDURE — 99232 SBSQ HOSP IP/OBS MODERATE 35: CPT | Performed by: INTERNAL MEDICINE

## 2022-10-14 PROCEDURE — 6370000000 HC RX 637 (ALT 250 FOR IP): Performed by: INTERNAL MEDICINE

## 2022-10-14 PROCEDURE — 2060000000 HC ICU INTERMEDIATE R&B

## 2022-10-14 PROCEDURE — 36415 COLL VENOUS BLD VENIPUNCTURE: CPT

## 2022-10-14 PROCEDURE — 94640 AIRWAY INHALATION TREATMENT: CPT

## 2022-10-14 PROCEDURE — 93308 TTE F-UP OR LMTD: CPT

## 2022-10-14 PROCEDURE — 93005 ELECTROCARDIOGRAM TRACING: CPT | Performed by: INTERNAL MEDICINE

## 2022-10-14 PROCEDURE — 85025 COMPLETE CBC W/AUTO DIFF WBC: CPT

## 2022-10-14 PROCEDURE — 6360000002 HC RX W HCPCS: Performed by: INTERNAL MEDICINE

## 2022-10-14 PROCEDURE — 6370000000 HC RX 637 (ALT 250 FOR IP)

## 2022-10-14 PROCEDURE — 80053 COMPREHEN METABOLIC PANEL: CPT

## 2022-10-14 RX ORDER — AMLODIPINE BESYLATE 2.5 MG/1
7.5 TABLET ORAL DAILY
Status: DISCONTINUED | OUTPATIENT
Start: 2022-10-14 | End: 2022-10-15

## 2022-10-14 RX ADMIN — COLCHICINE 0.6 MG: 0.6 TABLET, FILM COATED ORAL at 08:53

## 2022-10-14 RX ADMIN — BENZTROPINE MESYLATE 0.5 MG: 0.5 TABLET ORAL at 21:25

## 2022-10-14 RX ADMIN — METHYLPREDNISOLONE SODIUM SUCCINATE 40 MG: 40 INJECTION, POWDER, FOR SOLUTION INTRAMUSCULAR; INTRAVENOUS at 17:21

## 2022-10-14 RX ADMIN — AMLODIPINE BESYLATE 7.5 MG: 2.5 TABLET ORAL at 08:53

## 2022-10-14 RX ADMIN — METHYLPREDNISOLONE SODIUM SUCCINATE 40 MG: 40 INJECTION, POWDER, FOR SOLUTION INTRAMUSCULAR; INTRAVENOUS at 08:53

## 2022-10-14 RX ADMIN — IPRATROPIUM BROMIDE AND ALBUTEROL SULFATE 1 AMPULE: .5; 2.5 SOLUTION RESPIRATORY (INHALATION) at 13:06

## 2022-10-14 RX ADMIN — PANTOPRAZOLE SODIUM 40 MG: 40 TABLET, DELAYED RELEASE ORAL at 05:47

## 2022-10-14 RX ADMIN — IPRATROPIUM BROMIDE AND ALBUTEROL SULFATE 1 AMPULE: .5; 2.5 SOLUTION RESPIRATORY (INHALATION) at 19:50

## 2022-10-14 RX ADMIN — IPRATROPIUM BROMIDE AND ALBUTEROL SULFATE 1 AMPULE: .5; 2.5 SOLUTION RESPIRATORY (INHALATION) at 15:41

## 2022-10-14 RX ADMIN — IPRATROPIUM BROMIDE AND ALBUTEROL SULFATE 1 AMPULE: .5; 2.5 SOLUTION RESPIRATORY (INHALATION) at 08:12

## 2022-10-14 RX ADMIN — ACETAMINOPHEN 650 MG: 325 TABLET, FILM COATED ORAL at 21:24

## 2022-10-14 RX ADMIN — BENZTROPINE MESYLATE 0.5 MG: 0.5 TABLET ORAL at 08:53

## 2022-10-14 RX ADMIN — SODIUM CHLORIDE, PRESERVATIVE FREE 10 ML: 5 INJECTION INTRAVENOUS at 21:27

## 2022-10-14 RX ADMIN — MIRTAZAPINE 15 MG: 15 TABLET, FILM COATED ORAL at 21:25

## 2022-10-14 RX ADMIN — ACETAMINOPHEN 650 MG: 325 TABLET, FILM COATED ORAL at 04:39

## 2022-10-14 RX ADMIN — CARIPRAZINE 1.5 MG: 1.5 CAPSULE, GELATIN COATED ORAL at 08:53

## 2022-10-14 RX ADMIN — CLONIDINE HYDROCHLORIDE 0.1 MG: 0.1 TABLET ORAL at 21:25

## 2022-10-14 RX ADMIN — Medication 10 MG: at 21:24

## 2022-10-14 RX ADMIN — SODIUM CHLORIDE, PRESERVATIVE FREE 10 ML: 5 INJECTION INTRAVENOUS at 08:54

## 2022-10-14 RX ADMIN — HALOPERIDOL 0.5 MG: 0.5 TABLET ORAL at 08:53

## 2022-10-14 RX ADMIN — HALOPERIDOL 0.5 MG: 0.5 TABLET ORAL at 21:25

## 2022-10-14 ASSESSMENT — PAIN SCALES - GENERAL
PAINLEVEL_OUTOF10: 8
PAINLEVEL_OUTOF10: 0
PAINLEVEL_OUTOF10: 9
PAINLEVEL_OUTOF10: 5
PAINLEVEL_OUTOF10: 3

## 2022-10-14 ASSESSMENT — PAIN DESCRIPTION - ORIENTATION
ORIENTATION: MID
ORIENTATION: MID

## 2022-10-14 ASSESSMENT — PAIN DESCRIPTION - LOCATION
LOCATION: HEAD

## 2022-10-14 ASSESSMENT — PAIN DESCRIPTION - DESCRIPTORS
DESCRIPTORS: ACHING

## 2022-10-14 NOTE — PLAN OF CARE
Problem: Discharge Planning  Goal: Discharge to home or other facility with appropriate resources  10/14/2022 1036 by Rosy Streeter RN  Outcome: Progressing  10/13/2022 2039 by Clinton Figueroa RN  Outcome: Progressing  Flowsheets (Taken 10/13/2022 1920)  Discharge to home or other facility with appropriate resources: Identify barriers to discharge with patient and caregiver     Problem: Respiratory - Adult  Goal: Achieves optimal ventilation and oxygenation  10/14/2022 1036 by Rosy Streeter RN  Outcome: Progressing  10/13/2022 2039 by Clinton Figueroa RN  Outcome: Progressing     Problem: Pain  Goal: Verbalizes/displays adequate comfort level or baseline comfort level  10/14/2022 1036 by Rosy Streeter RN  Outcome: Progressing  10/13/2022 2039 by Clinton Figueroa RN  Outcome: Progressing     Problem: Safety - Adult  Goal: Free from fall injury  10/14/2022 1036 by Rosy Streeter RN  Outcome: Progressing  10/13/2022 2039 by Clinton Figueroa RN  Outcome: Progressing

## 2022-10-14 NOTE — PROGRESS NOTES
Hospitalist Progress Note      SYNOPSIS: Patient admitted on 10/12/2022 for Pericardial effusion      SUBJECTIVE:    Patient seen and examined at bedside. For limited echo today. Denies any shortness of breath, cough, chest pain today. Records reviewed. Stable overnight. No other overnight issues reported. Temp (24hrs), Av.1 °F (36.2 °C), Min:96.8 °F (36 °C), Max:97.4 °F (36.3 °C)    DIET: ADULT DIET; Regular; No Caffeine  CODE: Full Code  No intake or output data in the 24 hours ending 10/14/22 0900    OBJECTIVE:    BP (!) 146/81   Pulse 93   Temp 97.4 °F (36.3 °C) (Temporal)   Resp 18   SpO2 94%     General appearance: No apparent distress, appears stated age and cooperative. Respiratory: Bilateral air entry fair. Cardiovascular: Regular rate rhythm, normal S1-S2  Abdomen: Soft, nontender, nondistended  Musculoskeletal: No bilateral lower extremity edema. Neurologic: awake, alert and following commands     Assessment and plan    Patient, 61-year-old with past medical history of hypertension, schizoaffective schizophrenia, polysubstance abuse including cocaine/marijuana, nicotine dependence, was admitted on 10/12/2022 for evaluation of chest pain. Initial troponin 10 then trended down to 8. EKG shows normal sinus rhythm. Noted to have elevated D-dimer and underwent CTA chest which showed no PE but moderate pericardial effusion with possible tamponade physiology. Echocardiogram done on 10/12/2022 showed moderately dilated left ventricular chamber size, low normal left ventricular systolic function with EF 50%, no wall motion abnormalities, grade 2 diastolic dysfunction. It also showed small to moderate anterior pericardial effusion and a trace posterior effusion. No evidence of jed tamponade. There is some evidence of a pre-tamponade physiology. Cardiology consulted.   Patient symptoms deemed likely secondary to viral etiology/asthma/COPD exacerbation in setting of active cocaine use.    ASSESSMENT:    Pericardial effusion with no evidence of cardiac tamponade  Acute asthma/COPD exacerbation/rhinovirus URI  Hypertension  Schizoaffective schizophrenia  Polysubstance abuse including marijuana/cocaine  Nicotine dependence  Morbid obesity with BMI 41.35     PLAN:    Cardiology evaluation appreciated. Repeat limited echo done today. As per cardiology, to continue steroids/colchicine for now. Recommend repeat limited echo on Monday, 10/17/2022  Currently saturating well on room air  Continue IV Solu-Medrol today, likely switch to p.o. prednisone tomorrow/continue DuoNeb  Continue amlodipine  Continue Cogentin/Vraylar/Haldol/Remeron  Advised smoking cessation/abstinence from illicit drugs. Offered nicotine patch but patient refused. Continue PPI  DVT prophylaxis-Lovenox subcu      Discharge disposition-likely discharge home Monday, 10/17/2022 if okay with cardiology after repeating limited echo.       Medications:  REVIEWED DAILY    Infusion Medications    sodium chloride       Scheduled Medications    amLODIPine  7.5 mg Oral Daily    colchicine  0.6 mg Oral Daily    methylPREDNISolone  40 mg IntraVENous Q8H    pantoprazole  40 mg Oral QAM AC    ipratropium-albuterol  1 ampule Inhalation Q4H WA    benztropine  0.5 mg Oral BID    cariprazine hcl  1.5 mg Oral Daily    haloperidol  0.5 mg Oral BID    melatonin  10 mg Oral Nightly    mirtazapine  15 mg Oral Nightly    sodium chloride flush  5-40 mL IntraVENous 2 times per day     PRN Meds: perflutren lipid microspheres, perflutren lipid microspheres, sodium chloride flush, sodium chloride, ondansetron **OR** ondansetron, acetaminophen **OR** acetaminophen, magnesium hydroxide, cloNIDine    Labs:     Recent Labs     10/12/22  1128 10/13/22  0836 10/14/22  0632   WBC 9.0 12.0* 12.5*   HGB 12.4 13.1 12.6   HCT 39.5 41.0 39.6    418 408       Recent Labs     10/12/22  1128 10/13/22  0836 10/14/22  0632    140 137   K 3.6 4.0 3.7    102 102   CO2 30* 25 24   BUN 4* 10 14   CREATININE 0.8 0.8 0.8   CALCIUM 8.5* 9.0 8.6       Recent Labs     10/14/22  0632   PROT 7.2   ALKPHOS 114*   ALT 11   AST 13   BILITOT 0.3       No results for input(s): INR in the last 72 hours. No results for input(s): [place]ajo Bounds in the last 72 hours. Chronic labs:    Lab Results   Component Value Date    CHOL 245 (H) 10/13/2022    TRIG 89 10/13/2022    HDL 55 10/13/2022    LDLCALC 172 (H) 10/13/2022    TSH 0.966 05/05/2018    LABA1C 6.2 (H) 10/13/2022       Radiology: REVIEWED DAILY    +++++++++++++++++++++++++++++++++++++++++++++++++  Aiyana Souza MD  [facility] - [address], [place]  +++++++++++++++++++++++++++++++++++++++++++++++++  NOTE: This report was transcribed using voice recognition software. Every effort was made to ensure accuracy; however, inadvertent computerized transcription errors may be present.

## 2022-10-14 NOTE — PLAN OF CARE
Problem: Discharge Planning  Goal: Discharge to home or other facility with appropriate resources  10/13/2022 2039 by Maria E Hedrick RN  Outcome: Progressing  Flowsheets (Taken 10/13/2022 1920)  Discharge to home or other facility with appropriate resources: Identify barriers to discharge with patient and caregiver  10/13/2022 1027 by Lio Garrett RN  Outcome: Progressing     Problem: Respiratory - Adult  Goal: Achieves optimal ventilation and oxygenation  10/13/2022 2039 by Maria E Hedrick RN  Outcome: Progressing  10/13/2022 1027 by Lio Garrett RN  Outcome: Progressing     Problem: Pain  Goal: Verbalizes/displays adequate comfort level or baseline comfort level  10/13/2022 2039 by Maria E Hedrick RN  Outcome: Progressing  10/13/2022 1027 by Lio Garrett RN  Outcome: Progressing     Problem: Safety - Adult  Goal: Free from fall injury  10/13/2022 2039 by Maria E Hedrick RN  Outcome: Progressing  10/13/2022 1027 by Lio Garrett RN  Outcome: Progressing

## 2022-10-14 NOTE — CARE COORDINATION
Spoke with Pt by room phone. Pt lives in a single story apartment. Pt has walker. Pt will use insurance transportation at discharge. On echo list for today. Pending  discharge for Sunday. Changing to PO Steroids. Discharge Plan is to return home with no needs. SW/KENZIE to follow for discharge needs.    CHRISTEN Manuel.W.  [phone]

## 2022-10-14 NOTE — PROGRESS NOTES
PROGRESS NOTE     CARDIOLOGY    Chief complaint: Seen today for follow up, management & recommendations for pericardial effusion    She denies chest pain or shortness of breath today. She was reclining in bed receiving her echo. She was comfortable and in no distress. Wt Readings from Last 3 Encounters:   12/07/20 280 lb (127 kg)   04/16/18 290 lb (131.5 kg)   03/13/18 190 lb (86.2 kg)     Temp Readings from Last 3 Encounters:   10/14/22 97.4 °F (36.3 °C) (Temporal)   12/07/20 99.1 °F (37.3 °C)   04/16/18 98.4 °F (36.9 °C)     BP Readings from Last 3 Encounters:   10/14/22 (!) 146/81   12/07/20 (!) 128/100   04/16/18 (!) 205/152     Pulse Readings from Last 3 Encounters:   10/14/22 93   12/07/20 99   04/16/18 97         Intake/Output Summary (Last 24 hours) at 10/14/2022 1127  Last data filed at 10/14/2022 0845  Gross per 24 hour   Intake 180 ml   Output --   Net 180 ml       Recent Labs     10/12/22  1128 10/13/22  0836 10/14/22  0632   WBC 9.0 12.0* 12.5*   HGB 12.4 13.1 12.6   HCT 39.5 41.0 39.6   MCV 77.1* 77.1* 75.9*    418 408     Recent Labs     10/12/22  1128 10/13/22  0836 10/14/22  0632    140 137   K 3.6 4.0 3.7    102 102   CO2 30* 25 24   BUN 4* 10 14   CREATININE 0.8 0.8 0.8     No results for input(s): PROTIME, INR in the last 72 hours. No results for input(s): CKTOTAL, CKMB, CKMBINDEX, TROPONINI in the last 72 hours. No results for input(s): BNP in the last 72 hours.   Recent Labs     10/13/22  0836   CHOL 245*   HDL 55   TRIG 89     Recent Labs     10/12/22  1236 10/12/22  1827 10/12/22  2110   TROPHS 10* 8 8         amLODIPine (NORVASC) tablet 7.5 mg, Daily  colchicine (COLCRYS) tablet 0.6 mg, Daily  perflutren lipid microspheres (DEFINITY) injection 1.65 mg, ONCE PRN  methylPREDNISolone sodium (SOLU-MEDROL) injection 40 mg, Q8H  pantoprazole (PROTONIX) tablet 40 mg, QAM AC  ipratropium-albuterol (DUONEB) nebulizer solution 1 ampule, Q4H WA  perflutren lipid microspheres (DEFINITY) injection 1.65 mg, ONCE PRN  benztropine (COGENTIN) tablet 0.5 mg, BID  cariprazine hcl (VRAYLAR) capsule 1.5 mg, Daily  haloperidol (HALDOL) tablet 0.5 mg, BID  melatonin disintegrating tablet 10 mg, Nightly  mirtazapine (REMERON) tablet 15 mg, Nightly  sodium chloride flush 0.9 % injection 5-40 mL, 2 times per day  sodium chloride flush 0.9 % injection 10 mL, PRN  0.9 % sodium chloride infusion, PRN  ondansetron (ZOFRAN-ODT) disintegrating tablet 4 mg, Q8H PRN   Or  ondansetron (ZOFRAN) injection 4 mg, Q6H PRN  acetaminophen (TYLENOL) tablet 650 mg, Q6H PRN   Or  acetaminophen (TYLENOL) suppository 650 mg, Q6H PRN  magnesium hydroxide (MILK OF MAGNESIA) 400 MG/5ML suspension 30 mL, Daily PRN  cloNIDine (CATAPRES) tablet 0.1 mg, Q6H PRN        Review of systems:     Heart: as above   Lungs: as above   Eyes: denies changes in vision or discharge. Ears: denies changes in hearing or pain. Nose: denies epistaxis or masses   Throat: denies sore throat or trouble swallowing. Neuro: denies numbness, tingling, tremors. Skin: denies rashes or itching. : denies hematuria, dysuria   GI: denies vomiting, diarrhea   Psych: denies mood changed, anxiety, depression. Physical exam:    Constitutional: A&O x3, communicates well, no acute distress. Eyes: extraocular muscles intact, PERRL. Normal lids & conjunctiva. No icterus. ENT: clear, no bleeding. No external masses. Lips normal formation. Neck: supple, full ROM, no JVD, no bruits, no lymphadenopathy. No masses. trachea midline. Heart: regular rate & rhythm, normal S1 & S2, no abnormal murmurs. No heave. Lungs: Bilateral wheezing. No accessory muscles. Abd: soft, non-tender. Normal bowel sounds. Obese. Neuro: Full ROM X 4, EOMI, no tremors. EXT: No bilateral lower extremity edema  Skin: warm, dry, intact. Good turgor. Psych: A&O x 3, normal behavior, not anxious.       Assessment/Recommendations  Small to medium sized pericardial effusion. I preliminarily reviewed her echo images at the bedside with the echo tech. It has not changed in size. There is diastolic collapse of the right atrium only. Thus early signs of hemodynamic significance but no tamponade. I recommend continuing steroids and colchicine and repeating a limited echo on Monday prior to discharge. I reviewed these results and recommendations with her today. Troponin is negative. No myocarditis. Rhinovirus URI. Exacerbation of COPD/asthma. Active cocaine use. No beta-blockers. Schizoaffective disorder, anxiety/depression. Morbid obesity. Uncontrolled hypertension. I will increase Norvasc today. Note: This report was completed using computerized voice recognition software. Every effort has been made to ensure accuracy, however; and invert and computerized transcription errors may be present.

## 2022-10-15 PROBLEM — E66.813 CLASS 3 SEVERE OBESITY WITHOUT SERIOUS COMORBIDITY WITH BODY MASS INDEX (BMI) OF 40.0 TO 44.9 IN ADULT: Status: ACTIVE | Noted: 2022-10-12

## 2022-10-15 PROBLEM — F14.90 COCAINE USE: Status: ACTIVE | Noted: 2022-10-15

## 2022-10-15 LAB
ANION GAP SERPL CALCULATED.3IONS-SCNC: 9 MMOL/L (ref 7–16)
BUN BLDV-MCNC: 20 MG/DL (ref 6–20)
CALCIUM SERPL-MCNC: 8.6 MG/DL (ref 8.6–10.2)
CHLORIDE BLD-SCNC: 100 MMOL/L (ref 98–107)
CO2: 26 MMOL/L (ref 22–29)
CREAT SERPL-MCNC: 0.7 MG/DL (ref 0.5–1)
GFR AFRICAN AMERICAN: >60
GFR NON-AFRICAN AMERICAN: >60 ML/MIN/1.73
GLUCOSE BLD-MCNC: 165 MG/DL (ref 74–99)
HCT VFR BLD CALC: 41.2 % (ref 34–48)
HEMOGLOBIN: 13 G/DL (ref 11.5–15.5)
MCH RBC QN AUTO: 24.1 PG (ref 26–35)
MCHC RBC AUTO-ENTMCNC: 31.6 % (ref 32–34.5)
MCV RBC AUTO: 76.3 FL (ref 80–99.9)
PDW BLD-RTO: 17.7 FL (ref 11.5–15)
PLATELET # BLD: 440 E9/L (ref 130–450)
PMV BLD AUTO: 10.4 FL (ref 7–12)
POTASSIUM REFLEX MAGNESIUM: 4.3 MMOL/L (ref 3.5–5)
RBC # BLD: 5.4 E12/L (ref 3.5–5.5)
SODIUM BLD-SCNC: 135 MMOL/L (ref 132–146)
WBC # BLD: 9.8 E9/L (ref 4.5–11.5)

## 2022-10-15 PROCEDURE — 99232 SBSQ HOSP IP/OBS MODERATE 35: CPT | Performed by: INTERNAL MEDICINE

## 2022-10-15 PROCEDURE — 6370000000 HC RX 637 (ALT 250 FOR IP): Performed by: INTERNAL MEDICINE

## 2022-10-15 PROCEDURE — 85027 COMPLETE CBC AUTOMATED: CPT

## 2022-10-15 PROCEDURE — 2580000003 HC RX 258: Performed by: INTERNAL MEDICINE

## 2022-10-15 PROCEDURE — 94640 AIRWAY INHALATION TREATMENT: CPT

## 2022-10-15 PROCEDURE — 6360000002 HC RX W HCPCS: Performed by: INTERNAL MEDICINE

## 2022-10-15 PROCEDURE — 6370000000 HC RX 637 (ALT 250 FOR IP)

## 2022-10-15 PROCEDURE — 2060000000 HC ICU INTERMEDIATE R&B

## 2022-10-15 PROCEDURE — 80048 BASIC METABOLIC PNL TOTAL CA: CPT

## 2022-10-15 PROCEDURE — 36415 COLL VENOUS BLD VENIPUNCTURE: CPT

## 2022-10-15 RX ORDER — PREDNISONE 20 MG/1
40 TABLET ORAL DAILY
Status: DISCONTINUED | OUTPATIENT
Start: 2022-10-15 | End: 2022-10-17 | Stop reason: HOSPADM

## 2022-10-15 RX ORDER — AMLODIPINE BESYLATE 10 MG/1
10 TABLET ORAL DAILY
Status: DISCONTINUED | OUTPATIENT
Start: 2022-10-15 | End: 2022-10-17 | Stop reason: HOSPADM

## 2022-10-15 RX ORDER — LISINOPRIL 20 MG/1
20 TABLET ORAL DAILY
Status: DISCONTINUED | OUTPATIENT
Start: 2022-10-15 | End: 2022-10-16

## 2022-10-15 RX ADMIN — CARIPRAZINE 1.5 MG: 1.5 CAPSULE, GELATIN COATED ORAL at 08:33

## 2022-10-15 RX ADMIN — HALOPERIDOL 0.5 MG: 0.5 TABLET ORAL at 08:33

## 2022-10-15 RX ADMIN — BENZTROPINE MESYLATE 0.5 MG: 0.5 TABLET ORAL at 08:33

## 2022-10-15 RX ADMIN — IPRATROPIUM BROMIDE AND ALBUTEROL SULFATE 1 AMPULE: .5; 2.5 SOLUTION RESPIRATORY (INHALATION) at 07:56

## 2022-10-15 RX ADMIN — IPRATROPIUM BROMIDE AND ALBUTEROL SULFATE 1 AMPULE: .5; 2.5 SOLUTION RESPIRATORY (INHALATION) at 11:25

## 2022-10-15 RX ADMIN — ACETAMINOPHEN 650 MG: 325 TABLET, FILM COATED ORAL at 16:09

## 2022-10-15 RX ADMIN — IPRATROPIUM BROMIDE AND ALBUTEROL SULFATE 1 AMPULE: .5; 2.5 SOLUTION RESPIRATORY (INHALATION) at 20:32

## 2022-10-15 RX ADMIN — SODIUM CHLORIDE, PRESERVATIVE FREE 10 ML: 5 INJECTION INTRAVENOUS at 21:27

## 2022-10-15 RX ADMIN — ACETAMINOPHEN 650 MG: 325 TABLET, FILM COATED ORAL at 21:26

## 2022-10-15 RX ADMIN — LISINOPRIL 20 MG: 20 TABLET ORAL at 16:05

## 2022-10-15 RX ADMIN — PREDNISONE 40 MG: 20 TABLET ORAL at 08:33

## 2022-10-15 RX ADMIN — COLCHICINE 0.6 MG: 0.6 TABLET, FILM COATED ORAL at 08:33

## 2022-10-15 RX ADMIN — Medication 10 MG: at 21:26

## 2022-10-15 RX ADMIN — SODIUM CHLORIDE, PRESERVATIVE FREE 10 ML: 5 INJECTION INTRAVENOUS at 08:33

## 2022-10-15 RX ADMIN — AMLODIPINE BESYLATE 10 MG: 10 TABLET ORAL at 08:32

## 2022-10-15 RX ADMIN — METHYLPREDNISOLONE SODIUM SUCCINATE 40 MG: 40 INJECTION, POWDER, FOR SOLUTION INTRAMUSCULAR; INTRAVENOUS at 00:51

## 2022-10-15 RX ADMIN — HALOPERIDOL 0.5 MG: 0.5 TABLET ORAL at 21:26

## 2022-10-15 RX ADMIN — PANTOPRAZOLE SODIUM 40 MG: 40 TABLET, DELAYED RELEASE ORAL at 05:58

## 2022-10-15 RX ADMIN — MIRTAZAPINE 15 MG: 15 TABLET, FILM COATED ORAL at 21:27

## 2022-10-15 RX ADMIN — BENZTROPINE MESYLATE 0.5 MG: 0.5 TABLET ORAL at 21:26

## 2022-10-15 RX ADMIN — ACETAMINOPHEN 650 MG: 325 TABLET, FILM COATED ORAL at 06:04

## 2022-10-15 RX ADMIN — IPRATROPIUM BROMIDE AND ALBUTEROL SULFATE 1 AMPULE: .5; 2.5 SOLUTION RESPIRATORY (INHALATION) at 15:49

## 2022-10-15 ASSESSMENT — PAIN DESCRIPTION - LOCATION: LOCATION: HEAD

## 2022-10-15 ASSESSMENT — PAIN SCALES - GENERAL
PAINLEVEL_OUTOF10: 5
PAINLEVEL_OUTOF10: 8

## 2022-10-15 NOTE — PROGRESS NOTES
Patient is seen in follow-up for pericardial effusion    Subjective:     Ms. Dhruv Galindo feels better today, shortness of breath is improving, concerned about her pericardial effusion  Laying in bed no apparent distress    ROS:  CONSTITUTIONAL:  negative for  fevers, chills  HEENT:  negative for earaches, nasal congestion and epistaxis  RESPIRATORY:  negative for  dry cough, cough with sputum,wheezing and hemoptysis  GASTROINTESTINAL:  negative for nausea, vomiting  MUSCULOSKELETAL:  negative for  myalgias, arthralgias  NEUROLOGICAL:  negative for visual disturbance, dysphagia    Medication side effects: none    Scheduled Meds:   predniSONE  40 mg Oral Daily    amLODIPine  10 mg Oral Daily    colchicine  0.6 mg Oral Daily    pantoprazole  40 mg Oral QAM AC    ipratropium-albuterol  1 ampule Inhalation Q4H WA    benztropine  0.5 mg Oral BID    cariprazine hcl  1.5 mg Oral Daily    haloperidol  0.5 mg Oral BID    melatonin  10 mg Oral Nightly    mirtazapine  15 mg Oral Nightly    sodium chloride flush  5-40 mL IntraVENous 2 times per day     Continuous Infusions:   sodium chloride       PRN Meds:perflutren lipid microspheres, perflutren lipid microspheres, perflutren lipid microspheres, sodium chloride flush, sodium chloride, ondansetron **OR** ondansetron, acetaminophen **OR** acetaminophen, magnesium hydroxide, cloNIDine    I/O last 3 completed shifts:   In: 600 [P.O.:600]  Out: -   I/O this shift:  In: 150 [P.O.:150]  Out: -       Objective:      Physical Exam:   BP (!) 172/91   Pulse 72   Temp 97.4 °F (36.3 °C) (Temporal)   Resp 18   SpO2 99%   CONSTITUTIONAL:  awake, alert, cooperative, no apparent distress, and appears stated age  HEAD:  normocepalic, without obvious abnormality, atraumatic  NECK:  Supple, symmetrical, trachea midline, no adenopathy, thyroid symmetric, not enlarged and no tenderness, skin normal  LUNGS:  No increased work of breathing, No accessory muscle use or intercostal retractions, good air exchange, clear to auscultation bilaterally, no crackles or wheezing  CARDIOVASCULAR:  Normal apical impulse, regular rate and rhythm, normal S1 and S2, no S3 or S4, and no murmur noted, no edema, no JVD, no carotid bruit. ABDOMEN:  Soft, nontender, no masses, no hepatomegaly, no splenomegaly, BS+  MUSCULOSKELETAL:  No clubbing no cyanosis. there is no redness, warmth, or swelling of the joints  full range of motion noted  NEUROLOGIC:  Alert, awake,oriented x3  SKIN:  no bruising or bleeding, normal skin color, texture, turgor and no redness, warmth, or swelling      Cardiographics  I personally reviewed the telemetry monitor strips with the following interpretation: Sinus rhythm    Echocardiogram: 10/14/2022,Summary   Normal left ventricle size and systolic function. Stage I diastolic dysfunction. Occasional RV slight diastolic dimpling   RA diastolic collapse. Moderate size pericardial effusion. Early signs of hemodynamic significance. 10/12/2020, summary   Mildly dilated left ventricular chamber size. Low normal left ventricular systolic function. Visually estimated LVEF is 50%. No wall motion abnormalities. Grade II diastolic dysfunction. Normal right ventricle structure and function. The left atrium is mildly dilated. Normal right atrial size   Unable to estimate PA pressure. There is a small to moderate anterior pericardial effusion and a trace   posterior effusion. There is no evidence of jed tamponade. There is some evidence of a pre-tamponade physiology. No comparison study available. Imaging  CTA PULMONARY W CONTRAST   Final Result   1. No acute pulmonary embolus. 2.  No aneurysm formation or dissection of the thoracic aorta. 3.  No acute pulmonary process. 4.  There is a small size right atrium and the small size left atrium.       5.  Moderate pericardial effusion with prominent right brachial/axillary   vein, prominent IVC/hepatic veins with IV contrast reflux into hepatic veins   which indicates compromise of the right-sided heart function. 6.  Moderate pericardial effusion. 7.  Combination of described findings indicates cardiac tamponade physiology. Cardiac tamponade is a clinical diagnosis. Correlation with clinical data   and clinical examination needed. Can further evaluate with echocardiogram.      8.  Cardiac tamponade is a clinical diagnosis. Please correlate with   clinical examination and findings. Further evaluation with echocardiogram   can be helpful. 9.  Also noted is a pattern of pattern of concentric hypertrophy of the left   ventricle. Preliminary report was given to Dr. Jonah Hua, [place] [id number] physician in [facility]. RECOMMENDATIONS:   ence of pulmonary embolism or acute pulmonary abnormality. XR CHEST PORTABLE   Final Result   No acute process. Lab Review   Lab Results   Component Value Date/Time     10/15/2022 05:28 AM    K 4.3 10/15/2022 05:28 AM     10/15/2022 05:28 AM    CO2 26 10/15/2022 05:28 AM    BUN 20 10/15/2022 05:28 AM    CREATININE 0.7 10/15/2022 05:28 AM    GLUCOSE 165 10/15/2022 05:28 AM    GLUCOSE 99 01/26/2011 02:20 PM    CALCIUM 8.6 10/15/2022 05:28 AM     Lab Results   Component Value Date/Time    WBC 9.8 10/15/2022 05:28 AM    HGB 13.0 10/15/2022 05:28 AM    HCT 41.2 10/15/2022 05:28 AM    MCV 76.3 10/15/2022 05:28 AM     10/15/2022 05:28 AM     I have personally reviewed the laboratory, cardiac diagnostic and radiographic testing as outlined above:    Assessment:     1. Pericardial effusion: Small to moderate, clinically she is not in tamponade, actually patient is hypertensive without any tachycardia, will continue current treatment  2. Hypertension: Not well controlled, will adjust medications  3. Rhinovirus upper respiratory tract infection  4. Cocaine use  5. Obesity:    Recommendations:     1. Continue current treatment  2.   Lisinopril 20 mg 1 by mouth daily  3. Basic metabolic panel and CBC number  4.  Echocardiogram on Monday  5. Further cardiac recommendations will be forthcoming pending her clinical course and diagnostic test findings    Discussed with patient    Electronically signed by Honey Walker MD on 10/15/2022 at 2:55 PM  NOTE: This report was transcribed using voice recognition software.  Every effort was made to ensure accuracy; however, inadvertent computerized transcription errors may be present

## 2022-10-15 NOTE — PLAN OF CARE
Problem: Discharge Planning  Goal: Discharge to home or other facility with appropriate resources  Outcome: Progressing     Problem: Respiratory - Adult  Goal: Achieves optimal ventilation and oxygenation  Outcome: Progressing     Problem: Pain  Goal: Verbalizes/displays adequate comfort level or baseline comfort level  Outcome: Progressing     Problem: Safety - Adult  Goal: Free from fall injury  Outcome: Progressing

## 2022-10-15 NOTE — PROGRESS NOTES
Hospitalist Progress Note      SYNOPSIS: Patient admitted on 10/12/2022 for Pericardial effusion      SUBJECTIVE:    Patient seen and examined at bedside. Denies any new concerns. Denies any shortness of breath, cough, chest pain today. Records reviewed. Stable overnight. No other overnight issues reported. Temp (24hrs), Av.5 °F (36.4 °C), Min:97.1 °F (36.2 °C), Max:97.8 °F (36.6 °C)    DIET: ADULT DIET; Regular; No Caffeine  CODE: Full Code    Intake/Output Summary (Last 24 hours) at 10/15/2022 0815  Last data filed at 10/14/2022 1831  Gross per 24 hour   Intake 600 ml   Output --   Net 600 ml       OBJECTIVE:    BP (!) 164/79   Pulse 82   Temp 97.8 °F (36.6 °C) (Temporal)   Resp 19   SpO2 99%     General appearance: No apparent distress, appears stated age and cooperative. Respiratory: Bilateral air entry fair. Cardiovascular: Regular rate rhythm, normal S1-S2  Abdomen: Soft, nontender, nondistended  Musculoskeletal: No bilateral lower extremity edema. Neurologic: awake, alert and following commands     Assessment and plan    Patient, 72-year-old with past medical history of hypertension, schizoaffective schizophrenia, polysubstance abuse including cocaine/marijuana, nicotine dependence, was admitted on 10/12/2022 for evaluation of chest pain. Initial troponin 10 then trended down to 8. EKG shows normal sinus rhythm. Noted to have elevated D-dimer and underwent CTA chest which showed no PE but moderate pericardial effusion with possible tamponade physiology. Echocardiogram done on 10/12/2022 showed moderately dilated left ventricular chamber size, low normal left ventricular systolic function with EF 50%, no wall motion abnormalities, grade 2 diastolic dysfunction. It also showed small to moderate anterior pericardial effusion and a trace posterior effusion. No evidence of jed tamponade. There is some evidence of a pre-tamponade physiology. Cardiology consulted.   Patient symptoms deemed likely secondary to viral etiology/asthma/COPD exacerbation in setting of active cocaine use. ASSESSMENT:    Pericardial effusion with no evidence of cardiac tamponade  Acute asthma/COPD exacerbation/rhinovirus URI  Hypertension  Schizoaffective schizophrenia  Polysubstance abuse including marijuana/cocaine  Nicotine dependence  Morbid obesity with BMI 41.35     PLAN:    Cardiology evaluation appreciated. Patient had limited echo 10/14/2022 which still shows moderate-sized pericardial effusion with early signs of hemodynamic significance but no tamponade. As per cardiology, to continue steroids/colchicine for now. Recommend repeat limited echo on Monday, 10/17/2022  Currently saturating well on room air  Continue p.o. prednisone/continue DuoNeb  Continue amlodipine  Continue Cogentin/Vraylar/Haldol/Remeron  Advised smoking cessation/abstinence from illicit drugs. Offered nicotine patch but patient refused. Continue PPI  DVT prophylaxis-Lovenox subcu      Discharge disposition-likely discharge home Monday, 10/17/2022 if okay with cardiology after repeating limited echo.       Medications:  REVIEWED DAILY    Infusion Medications    sodium chloride       Scheduled Medications    predniSONE  40 mg Oral Daily    amLODIPine  7.5 mg Oral Daily    colchicine  0.6 mg Oral Daily    pantoprazole  40 mg Oral QAM AC    ipratropium-albuterol  1 ampule Inhalation Q4H WA    benztropine  0.5 mg Oral BID    cariprazine hcl  1.5 mg Oral Daily    haloperidol  0.5 mg Oral BID    melatonin  10 mg Oral Nightly    mirtazapine  15 mg Oral Nightly    sodium chloride flush  5-40 mL IntraVENous 2 times per day     PRN Meds: perflutren lipid microspheres, perflutren lipid microspheres, perflutren lipid microspheres, sodium chloride flush, sodium chloride, ondansetron **OR** ondansetron, acetaminophen **OR** acetaminophen, magnesium hydroxide, cloNIDine    Labs:     Recent Labs     10/13/22  0836 10/14/22  0632 10/15/22  0528   WBC 12.0* 12.5* 9.8   HGB 13.1 12.6 13.0   HCT 41.0 39.6 41.2    408 440         Recent Labs     10/13/22  0836 10/14/22  0632 10/15/22  0528    137 135   K 4.0 3.7 4.3    102 100   CO2 25 24 26   BUN 10 14 20   CREATININE 0.8 0.8 0.7   CALCIUM 9.0 8.6 8.6         Recent Labs     10/14/22  0632   PROT 7.2   ALKPHOS 114*   ALT 11   AST 13   BILITOT 0.3         No results for input(s): INR in the last 72 hours. No results for input(s): [place] in the last 72 hours. Chronic labs:    Lab Results   Component Value Date    CHOL 245 (H) 10/13/2022    TRIG 89 10/13/2022    HDL 55 10/13/2022    LDLCALC 172 (H) 10/13/2022    TSH 0.966 05/05/2018    LABA1C 6.2 (H) 10/13/2022       Radiology: REVIEWED DAILY    +++++++++++++++++++++++++++++++++++++++++++++++++  George Pa MD  [employer] - [address], [facility]  +++++++++++++++++++++++++++++++++++++++++++++++++  NOTE: This report was transcribed using voice recognition software. Every effort was made to ensure accuracy; however, inadvertent computerized transcription errors may be present.

## 2022-10-16 LAB
ANION GAP SERPL CALCULATED.3IONS-SCNC: 11 MMOL/L (ref 7–16)
BUN BLDV-MCNC: 20 MG/DL (ref 6–20)
CALCIUM SERPL-MCNC: 8.4 MG/DL (ref 8.6–10.2)
CHLORIDE BLD-SCNC: 103 MMOL/L (ref 98–107)
CO2: 25 MMOL/L (ref 22–29)
CREAT SERPL-MCNC: 0.8 MG/DL (ref 0.5–1)
GFR AFRICAN AMERICAN: >60
GFR NON-AFRICAN AMERICAN: >60 ML/MIN/1.73
GLUCOSE BLD-MCNC: 100 MG/DL (ref 74–99)
HCT VFR BLD CALC: 41.2 % (ref 34–48)
HEMOGLOBIN: 13.4 G/DL (ref 11.5–15.5)
MCH RBC QN AUTO: 24.5 PG (ref 26–35)
MCHC RBC AUTO-ENTMCNC: 32.5 % (ref 32–34.5)
MCV RBC AUTO: 75.3 FL (ref 80–99.9)
PDW BLD-RTO: 17.5 FL (ref 11.5–15)
PLATELET # BLD: 442 E9/L (ref 130–450)
PMV BLD AUTO: 10.2 FL (ref 7–12)
POTASSIUM REFLEX MAGNESIUM: 3.6 MMOL/L (ref 3.5–5)
RBC # BLD: 5.47 E12/L (ref 3.5–5.5)
SODIUM BLD-SCNC: 139 MMOL/L (ref 132–146)
WBC # BLD: 14.5 E9/L (ref 4.5–11.5)

## 2022-10-16 PROCEDURE — 6370000000 HC RX 637 (ALT 250 FOR IP): Performed by: INTERNAL MEDICINE

## 2022-10-16 PROCEDURE — 94640 AIRWAY INHALATION TREATMENT: CPT

## 2022-10-16 PROCEDURE — 2580000003 HC RX 258: Performed by: INTERNAL MEDICINE

## 2022-10-16 PROCEDURE — 36415 COLL VENOUS BLD VENIPUNCTURE: CPT

## 2022-10-16 PROCEDURE — 85027 COMPLETE CBC AUTOMATED: CPT

## 2022-10-16 PROCEDURE — 80048 BASIC METABOLIC PNL TOTAL CA: CPT

## 2022-10-16 PROCEDURE — 6370000000 HC RX 637 (ALT 250 FOR IP): Performed by: EMERGENCY MEDICINE

## 2022-10-16 PROCEDURE — 2060000000 HC ICU INTERMEDIATE R&B

## 2022-10-16 PROCEDURE — 6370000000 HC RX 637 (ALT 250 FOR IP)

## 2022-10-16 PROCEDURE — 99232 SBSQ HOSP IP/OBS MODERATE 35: CPT | Performed by: INTERNAL MEDICINE

## 2022-10-16 RX ORDER — LISINOPRIL 20 MG/1
20 TABLET ORAL 2 TIMES DAILY
Status: DISCONTINUED | OUTPATIENT
Start: 2022-10-16 | End: 2022-10-17 | Stop reason: HOSPADM

## 2022-10-16 RX ORDER — NICOTINE 21 MG/24HR
1 PATCH, TRANSDERMAL 24 HOURS TRANSDERMAL DAILY
Status: DISCONTINUED | OUTPATIENT
Start: 2022-10-16 | End: 2022-10-17 | Stop reason: HOSPADM

## 2022-10-16 RX ADMIN — PREDNISONE 40 MG: 20 TABLET ORAL at 08:33

## 2022-10-16 RX ADMIN — LISINOPRIL 20 MG: 20 TABLET ORAL at 08:32

## 2022-10-16 RX ADMIN — BENZTROPINE MESYLATE 0.5 MG: 0.5 TABLET ORAL at 19:35

## 2022-10-16 RX ADMIN — ACETAMINOPHEN 650 MG: 325 TABLET, FILM COATED ORAL at 19:43

## 2022-10-16 RX ADMIN — SODIUM CHLORIDE, PRESERVATIVE FREE 10 ML: 5 INJECTION INTRAVENOUS at 08:33

## 2022-10-16 RX ADMIN — ACETAMINOPHEN 650 MG: 325 TABLET, FILM COATED ORAL at 08:35

## 2022-10-16 RX ADMIN — MIRTAZAPINE 15 MG: 15 TABLET, FILM COATED ORAL at 19:34

## 2022-10-16 RX ADMIN — IPRATROPIUM BROMIDE AND ALBUTEROL SULFATE 1 AMPULE: .5; 2.5 SOLUTION RESPIRATORY (INHALATION) at 20:55

## 2022-10-16 RX ADMIN — IPRATROPIUM BROMIDE AND ALBUTEROL SULFATE 1 AMPULE: .5; 2.5 SOLUTION RESPIRATORY (INHALATION) at 08:09

## 2022-10-16 RX ADMIN — BENZTROPINE MESYLATE 0.5 MG: 0.5 TABLET ORAL at 08:33

## 2022-10-16 RX ADMIN — IPRATROPIUM BROMIDE AND ALBUTEROL SULFATE 1 AMPULE: .5; 2.5 SOLUTION RESPIRATORY (INHALATION) at 15:14

## 2022-10-16 RX ADMIN — HALOPERIDOL 0.5 MG: 0.5 TABLET ORAL at 19:35

## 2022-10-16 RX ADMIN — LISINOPRIL 20 MG: 20 TABLET ORAL at 19:34

## 2022-10-16 RX ADMIN — IPRATROPIUM BROMIDE AND ALBUTEROL SULFATE 1 AMPULE: .5; 2.5 SOLUTION RESPIRATORY (INHALATION) at 11:38

## 2022-10-16 RX ADMIN — PANTOPRAZOLE SODIUM 40 MG: 40 TABLET, DELAYED RELEASE ORAL at 05:48

## 2022-10-16 RX ADMIN — COLCHICINE 0.6 MG: 0.6 TABLET, FILM COATED ORAL at 13:29

## 2022-10-16 RX ADMIN — Medication 10 MG: at 19:34

## 2022-10-16 RX ADMIN — SODIUM CHLORIDE, PRESERVATIVE FREE 10 ML: 5 INJECTION INTRAVENOUS at 19:36

## 2022-10-16 RX ADMIN — AMLODIPINE BESYLATE 10 MG: 10 TABLET ORAL at 08:32

## 2022-10-16 RX ADMIN — CARIPRAZINE 1.5 MG: 1.5 CAPSULE, GELATIN COATED ORAL at 08:32

## 2022-10-16 RX ADMIN — HALOPERIDOL 0.5 MG: 0.5 TABLET ORAL at 08:32

## 2022-10-16 ASSESSMENT — PAIN SCALES - GENERAL
PAINLEVEL_OUTOF10: 0
PAINLEVEL_OUTOF10: 2
PAINLEVEL_OUTOF10: 0
PAINLEVEL_OUTOF10: 3

## 2022-10-16 ASSESSMENT — PAIN DESCRIPTION - DESCRIPTORS: DESCRIPTORS: ACHING

## 2022-10-16 ASSESSMENT — PAIN DESCRIPTION - LOCATION: LOCATION: HEAD

## 2022-10-16 NOTE — PROGRESS NOTES
Hospitalist Progress Note      SYNOPSIS: Patient admitted on 10/12/2022 for Pericardial effusion      SUBJECTIVE:    Patient seen and examined at bedside. Denies any new concerns. Denies any shortness of breath, cough, chest pain today. Records reviewed. Stable overnight. No other overnight issues reported. Temp (24hrs), Av.6 °F (36.4 °C), Min:97.3 °F (36.3 °C), Max:97.8 °F (36.6 °C)    DIET: ADULT DIET; Regular; No Caffeine  CODE: Full Code    Intake/Output Summary (Last 24 hours) at 10/16/2022 0802  Last data filed at 10/15/2022 1422  Gross per 24 hour   Intake 150 ml   Output --   Net 150 ml         OBJECTIVE:    BP (!) 167/94   Pulse 69   Temp 97.3 °F (36.3 °C) (Temporal)   Resp 18   SpO2 96%     General appearance: No apparent distress, appears stated age and cooperative. Respiratory: Bilateral air entry fair. Cardiovascular: Regular rate rhythm, normal S1-S2  Abdomen: Soft, nontender, nondistended  Musculoskeletal: No bilateral lower extremity edema. Neurologic: awake, alert and following commands     Assessment and plan    Patient, 56-year-old with past medical history of hypertension, schizoaffective schizophrenia, polysubstance abuse including cocaine/marijuana, nicotine dependence, was admitted on 10/12/2022 for evaluation of chest pain. Initial troponin 10 then trended down to 8. EKG shows normal sinus rhythm. Noted to have elevated D-dimer and underwent CTA chest which showed no PE but moderate pericardial effusion with possible tamponade physiology. Echocardiogram done on 10/12/2022 showed moderately dilated left ventricular chamber size, low normal left ventricular systolic function with EF 50%, no wall motion abnormalities, grade 2 diastolic dysfunction. It also showed small to moderate anterior pericardial effusion and a trace posterior effusion. No evidence of jed tamponade. There is some evidence of a pre-tamponade physiology. Cardiology consulted.   Patient symptoms deemed likely secondary to viral etiology/asthma/COPD exacerbation in setting of active cocaine use. ASSESSMENT:    Pericardial effusion with no evidence of cardiac tamponade  Acute asthma/COPD exacerbation/rhinovirus URI  Hypertension  Schizoaffective schizophrenia  Polysubstance abuse including marijuana/cocaine  Nicotine dependence  Morbid obesity with BMI 41.35     PLAN:    Cardiology evaluation appreciated. Patient had limited echo 10/14/2022 which still shows moderate-sized pericardial effusion with early signs of hemodynamic significance but no tamponade. As per cardiology, to continue steroids/colchicine for now. Recommend repeat limited echo on Monday, 10/17/2022  Currently saturating well on room air  Continue p.o. prednisone/continue DuoNeb  Continue amlodipine, dose was increased to 10 mg daily. Cardiology added lisinopril as well. Continue Cogentin/Vraylar/Haldol/Remeron  Advised smoking cessation/abstinence from illicit drugs. Had offered nicotine patch but patient refused. Continue PPI  DVT prophylaxis-Lovenox subcu      Discharge disposition-likely discharge home Monday, 10/17/2022 if okay with cardiology after repeating limited echo.       Medications:  REVIEWED DAILY    Infusion Medications    sodium chloride       Scheduled Medications    predniSONE  40 mg Oral Daily    amLODIPine  10 mg Oral Daily    lisinopril  20 mg Oral Daily    colchicine  0.6 mg Oral Daily    pantoprazole  40 mg Oral QAM AC    ipratropium-albuterol  1 ampule Inhalation Q4H WA    benztropine  0.5 mg Oral BID    cariprazine hcl  1.5 mg Oral Daily    haloperidol  0.5 mg Oral BID    melatonin  10 mg Oral Nightly    mirtazapine  15 mg Oral Nightly    sodium chloride flush  5-40 mL IntraVENous 2 times per day     PRN Meds: perflutren lipid microspheres, perflutren lipid microspheres, perflutren lipid microspheres, sodium chloride flush, sodium chloride, ondansetron **OR** ondansetron, acetaminophen **OR** acetaminophen, magnesium hydroxide, cloNIDine    Labs:     Recent Labs     10/14/22  0632 10/15/22  0528 10/16/22  0522   WBC 12.5* 9.8 14.5*   HGB 12.6 13.0 13.4   HCT 39.6 41.2 41.2    440 442         Recent Labs     10/14/22  0632 10/15/22  0528 10/16/22  0522    135 139   K 3.7 4.3 3.6    100 103   CO2 24 26 25   BUN 14 20 20   CREATININE 0.8 0.7 0.8   CALCIUM 8.6 8.6 8.4*         Recent Labs     10/14/22  0632   PROT 7.2   ALKPHOS 114*   ALT 11   AST 13   BILITOT 0.3         No results for input(s): INR in the last 72 hours. No results for input(s): [place] in the last 72 hours. Chronic labs:    Lab Results   Component Value Date    CHOL 245 (H) 10/13/2022    TRIG 89 10/13/2022    HDL 55 10/13/2022    LDLCALC 172 (H) 10/13/2022    TSH 0.966 05/05/2018    LABA1C 6.2 (H) 10/13/2022       Radiology: REVIEWED DAILY    +++++++++++++++++++++++++++++++++++++++++++++++++  Shazia Batres MD  [employer] Physician - [address] [place], [place]  +++++++++++++++++++++++++++++++++++++++++++++++++  NOTE: This report was transcribed using voice recognition software. Every effort was made to ensure accuracy; however, inadvertent computerized transcription errors may be present.

## 2022-10-16 NOTE — PROGRESS NOTES
Patient is seen in follow-up for pericardial effusion    Subjective:     Ms. Clemente Atkinson feels better today, wants to go home  Laying in bed no apparent distress    ROS:  CONSTITUTIONAL:  negative for  fevers, chills  HEENT:  negative for earaches, nasal congestion and epistaxis  RESPIRATORY:  negative for  dry cough, cough with sputum,wheezing and hemoptysis  GASTROINTESTINAL:  negative for nausea, vomiting  MUSCULOSKELETAL:  negative for  myalgias, arthralgias  NEUROLOGICAL:  negative for visual disturbance, dysphagia    Medication side effects: none    Scheduled Meds:   lisinopril  20 mg Oral BID    predniSONE  40 mg Oral Daily    amLODIPine  10 mg Oral Daily    colchicine  0.6 mg Oral Daily    pantoprazole  40 mg Oral QAM AC    ipratropium-albuterol  1 ampule Inhalation Q4H WA    benztropine  0.5 mg Oral BID    cariprazine hcl  1.5 mg Oral Daily    haloperidol  0.5 mg Oral BID    melatonin  10 mg Oral Nightly    mirtazapine  15 mg Oral Nightly    sodium chloride flush  5-40 mL IntraVENous 2 times per day     Continuous Infusions:   sodium chloride       PRN Meds:perflutren lipid microspheres, perflutren lipid microspheres, perflutren lipid microspheres, sodium chloride flush, sodium chloride, ondansetron **OR** ondansetron, acetaminophen **OR** acetaminophen, magnesium hydroxide, cloNIDine    I/O last 3 completed shifts: In: 150 [P.O.:150]  Out: -   No intake/output data recorded.       Objective:      Physical Exam:   BP (!) 173/105   Pulse 88   Temp 97 °F (36.1 °C) (Temporal)   Resp 18   SpO2 95%   CONSTITUTIONAL:  awake, alert, cooperative, no apparent distress, and appears stated age  HEAD:  normocepalic, without obvious abnormality, atraumatic  NECK:  Supple, symmetrical, trachea midline, no adenopathy, thyroid symmetric, not enlarged and no tenderness, skin normal  LUNGS:  No increased work of breathing, No accessory muscle use or intercostal retractions, good air exchange, clear to auscultation bilaterally, no crackles or wheezing  CARDIOVASCULAR:  Normal apical impulse, regular rate and rhythm, normal S1 and S2, no S3 or S4, and no murmur noted, no edema, no JVD, no carotid bruit. ABDOMEN:  Soft, nontender, no masses, no hepatomegaly, no splenomegaly, BS+  MUSCULOSKELETAL:  No clubbing no cyanosis. there is no redness, warmth, or swelling of the joints  full range of motion noted  NEUROLOGIC:  Alert, awake,oriented x3  SKIN:  no bruising or bleeding, normal skin color, texture, turgor and no redness, warmth, or swelling      Cardiographics  I personally reviewed the telemetry monitor strips with the following interpretation: Sinus rhythm    Echocardiogram: 10/14/2022,Summary   Normal left ventricle size and systolic function. Stage I diastolic dysfunction. Occasional RV slight diastolic dimpling   RA diastolic collapse. Moderate size pericardial effusion. Early signs of hemodynamic significance. 10/12/2020, summary   Mildly dilated left ventricular chamber size. Low normal left ventricular systolic function. Visually estimated LVEF is 50%. No wall motion abnormalities. Grade II diastolic dysfunction. Normal right ventricle structure and function. The left atrium is mildly dilated. Normal right atrial size   Unable to estimate PA pressure. There is a small to moderate anterior pericardial effusion and a trace   posterior effusion. There is no evidence of jed tamponade. There is some evidence of a pre-tamponade physiology. No comparison study available. Imaging  CTA PULMONARY W CONTRAST   Final Result   1. No acute pulmonary embolus. 2.  No aneurysm formation or dissection of the thoracic aorta. 3.  No acute pulmonary process. 4.  There is a small size right atrium and the small size left atrium.       5.  Moderate pericardial effusion with prominent right brachial/axillary   vein, prominent IVC/hepatic veins with IV contrast reflux into hepatic veins which indicates compromise of the right-sided heart function. 6.  Moderate pericardial effusion. 7.  Combination of described findings indicates cardiac tamponade physiology. Cardiac tamponade is a clinical diagnosis. Correlation with clinical data   and clinical examination needed. Can further evaluate with echocardiogram.      8.  Cardiac tamponade is a clinical diagnosis. Please correlate with   clinical examination and findings. Further evaluation with echocardiogram   can be helpful. 9.  Also noted is a pattern of pattern of concentric hypertrophy of the left   ventricle. Preliminary report was [facility] in [facility]. RECOMMENDATIONS:   ence of pulmonary embolism or acute pulmonary abnormality. XR CHEST PORTABLE   Final Result   No acute process. Lab Review   Lab Results   Component Value Date/Time     10/16/2022 05:22 AM    K 3.6 10/16/2022 05:22 AM     10/16/2022 05:22 AM    CO2 25 10/16/2022 05:22 AM    BUN 20 10/16/2022 05:22 AM    CREATININE 0.8 10/16/2022 05:22 AM    GLUCOSE 100 10/16/2022 05:22 AM    GLUCOSE 99 01/26/2011 02:20 PM    CALCIUM 8.4 10/16/2022 05:22 AM     Lab Results   Component Value Date/Time    WBC 14.5 10/16/2022 05:22 AM    HGB 13.4 10/16/2022 05:22 AM    HCT 41.2 10/16/2022 05:22 AM    MCV 75.3 10/16/2022 05:22 AM     10/16/2022 05:22 AM     I have personally reviewed the laboratory, cardiac diagnostic and radiographic testing as outlined above:    Assessment:     1. Pericardial effusion: Small to moderate, clinically, is not in tamponade,will continue current treatment  2. Hypertension: Not well controlled, will adjust medications  3. Rhinovirus upper respiratory tract infection  4. Cocaine use  5. Obesity:    Recommendations:     1. Change lisinopril 20 mg to twice daily  2. Continue the rest of treatment  3.   Basic metabolic panel and CBC number  4.  Echocardiogram tomorrow to

## 2022-10-16 NOTE — PLAN OF CARE
Problem: Discharge Planning  Goal: Discharge to home or other facility with appropriate resources  Outcome: Progressing     Problem: Respiratory - Adult  Goal: Achieves optimal ventilation and oxygenation  Outcome: Progressing     Problem: Pain  Goal: Verbalizes/displays adequate comfort level or baseline comfort level  Outcome: Progressing     Problem: Safety - Adult  Goal: Free from fall injury  Outcome: Progressing  Flowsheets (Taken 10/15/2022 1209 by Wilner Segura RN)  Free From Fall Injury: Instruct family/caregiver on patient safety

## 2022-10-17 VITALS
WEIGHT: 251.1 LBS | RESPIRATION RATE: 19 BRPM | SYSTOLIC BLOOD PRESSURE: 135 MMHG | OXYGEN SATURATION: 97 % | BODY MASS INDEX: 37.08 KG/M2 | TEMPERATURE: 97.2 F | DIASTOLIC BLOOD PRESSURE: 80 MMHG | HEART RATE: 70 BPM

## 2022-10-17 LAB
ANION GAP SERPL CALCULATED.3IONS-SCNC: 12 MMOL/L (ref 7–16)
BUN BLDV-MCNC: 18 MG/DL (ref 6–20)
CALCIUM SERPL-MCNC: 8.7 MG/DL (ref 8.6–10.2)
CHLORIDE BLD-SCNC: 100 MMOL/L (ref 98–107)
CO2: 26 MMOL/L (ref 22–29)
CREAT SERPL-MCNC: 0.7 MG/DL (ref 0.5–1)
GFR AFRICAN AMERICAN: >60
GFR NON-AFRICAN AMERICAN: >60 ML/MIN/1.73
GLUCOSE BLD-MCNC: 94 MG/DL (ref 74–99)
HCT VFR BLD CALC: 44.1 % (ref 34–48)
HEMOGLOBIN: 14.1 G/DL (ref 11.5–15.5)
MAGNESIUM: 2.3 MG/DL (ref 1.6–2.6)
MCH RBC QN AUTO: 24.2 PG (ref 26–35)
MCHC RBC AUTO-ENTMCNC: 32 % (ref 32–34.5)
MCV RBC AUTO: 75.6 FL (ref 80–99.9)
PDW BLD-RTO: 18.9 FL (ref 11.5–15)
PLATELET # BLD: 392 E9/L (ref 130–450)
PMV BLD AUTO: 10.1 FL (ref 7–12)
POTASSIUM REFLEX MAGNESIUM: 3.5 MMOL/L (ref 3.5–5)
RBC # BLD: 5.83 E12/L (ref 3.5–5.5)
SODIUM BLD-SCNC: 138 MMOL/L (ref 132–146)
WBC # BLD: 16.3 E9/L (ref 4.5–11.5)

## 2022-10-17 PROCEDURE — 6370000000 HC RX 637 (ALT 250 FOR IP): Performed by: INTERNAL MEDICINE

## 2022-10-17 PROCEDURE — 94640 AIRWAY INHALATION TREATMENT: CPT

## 2022-10-17 PROCEDURE — 80048 BASIC METABOLIC PNL TOTAL CA: CPT

## 2022-10-17 PROCEDURE — 85027 COMPLETE CBC AUTOMATED: CPT

## 2022-10-17 PROCEDURE — 6370000000 HC RX 637 (ALT 250 FOR IP)

## 2022-10-17 PROCEDURE — 93308 TTE F-UP OR LMTD: CPT

## 2022-10-17 PROCEDURE — 99231 SBSQ HOSP IP/OBS SF/LOW 25: CPT | Performed by: INTERNAL MEDICINE

## 2022-10-17 PROCEDURE — 36415 COLL VENOUS BLD VENIPUNCTURE: CPT

## 2022-10-17 PROCEDURE — 83735 ASSAY OF MAGNESIUM: CPT

## 2022-10-17 PROCEDURE — 2580000003 HC RX 258: Performed by: INTERNAL MEDICINE

## 2022-10-17 RX ORDER — ALBUTEROL SULFATE 90 UG/1
2 AEROSOL, METERED RESPIRATORY (INHALATION) EVERY 6 HOURS PRN
Qty: 18 G | Refills: 1 | Status: SHIPPED | OUTPATIENT
Start: 2022-10-17 | End: 2022-11-16

## 2022-10-17 RX ORDER — NICOTINE 21 MG/24HR
1 PATCH, TRANSDERMAL 24 HOURS TRANSDERMAL DAILY
Qty: 30 PATCH | Refills: 3 | Status: SHIPPED | OUTPATIENT
Start: 2022-10-17

## 2022-10-17 RX ORDER — LISINOPRIL 20 MG/1
20 TABLET ORAL 2 TIMES DAILY
Qty: 30 TABLET | Refills: 3 | Status: SHIPPED | OUTPATIENT
Start: 2022-10-17

## 2022-10-17 RX ORDER — PANTOPRAZOLE SODIUM 40 MG/1
40 TABLET, DELAYED RELEASE ORAL
Qty: 30 TABLET | Refills: 3 | Status: SHIPPED | OUTPATIENT
Start: 2022-10-18

## 2022-10-17 RX ORDER — PREDNISONE 20 MG/1
40 TABLET ORAL DAILY
Qty: 20 TABLET | Refills: 0 | Status: SHIPPED | OUTPATIENT
Start: 2022-10-18 | End: 2022-10-28

## 2022-10-17 RX ORDER — COLCHICINE 0.6 MG/1
0.6 TABLET ORAL DAILY
Qty: 30 TABLET | Refills: 0 | Status: SHIPPED | OUTPATIENT
Start: 2022-10-18

## 2022-10-17 RX ORDER — AMLODIPINE BESYLATE 10 MG/1
10 TABLET ORAL DAILY
Qty: 30 TABLET | Refills: 3 | Status: SHIPPED | OUTPATIENT
Start: 2022-10-18

## 2022-10-17 RX ADMIN — CARIPRAZINE 1.5 MG: 1.5 CAPSULE, GELATIN COATED ORAL at 08:42

## 2022-10-17 RX ADMIN — AMLODIPINE BESYLATE 10 MG: 10 TABLET ORAL at 08:41

## 2022-10-17 RX ADMIN — PANTOPRAZOLE SODIUM 40 MG: 40 TABLET, DELAYED RELEASE ORAL at 06:01

## 2022-10-17 RX ADMIN — COLCHICINE 0.6 MG: 0.6 TABLET, FILM COATED ORAL at 08:42

## 2022-10-17 RX ADMIN — BENZTROPINE MESYLATE 0.5 MG: 0.5 TABLET ORAL at 08:42

## 2022-10-17 RX ADMIN — IPRATROPIUM BROMIDE AND ALBUTEROL SULFATE 1 AMPULE: .5; 2.5 SOLUTION RESPIRATORY (INHALATION) at 12:51

## 2022-10-17 RX ADMIN — LISINOPRIL 20 MG: 20 TABLET ORAL at 08:41

## 2022-10-17 RX ADMIN — HALOPERIDOL 0.5 MG: 0.5 TABLET ORAL at 08:42

## 2022-10-17 RX ADMIN — IPRATROPIUM BROMIDE AND ALBUTEROL SULFATE 1 AMPULE: .5; 2.5 SOLUTION RESPIRATORY (INHALATION) at 08:27

## 2022-10-17 RX ADMIN — SODIUM CHLORIDE, PRESERVATIVE FREE 10 ML: 5 INJECTION INTRAVENOUS at 08:42

## 2022-10-17 RX ADMIN — PREDNISONE 40 MG: 20 TABLET ORAL at 08:42
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
## 2022-10-17 NOTE — CARE COORDINATION
Spoke by room phone to confirm discharge plan is to return home with no needs. Will need transportation through insurance. Waiting for limited echo to be completed before discharge. RN called echo to alert this is pending discharge. Pt will get echo today. Discharge plan is to return home with no needs. SW/CM to follow for discharge needs.    NITIN VallesS.AUDI.  [phone]

## 2022-10-17 NOTE — PROGRESS NOTES
Patient is seen in follow-up for pericardial effusion    Subjective:     Ms. Darren Martinez feels better today, wants to go home  Laying in bed no apparent distress    ROS:  CONSTITUTIONAL:  negative for  fevers, chills  HEENT:  negative for earaches, nasal congestion and epistaxis  RESPIRATORY:  negative for  dry cough, cough with sputum,wheezing and hemoptysis  GASTROINTESTINAL:  negative for nausea, vomiting  MUSCULOSKELETAL:  negative for  myalgias, arthralgias  NEUROLOGICAL:  negative for visual disturbance, dysphagia    Medication side effects: none    Scheduled Meds:   lisinopril  20 mg Oral BID    nicotine  1 patch TransDERmal Daily    predniSONE  40 mg Oral Daily    amLODIPine  10 mg Oral Daily    colchicine  0.6 mg Oral Daily    pantoprazole  40 mg Oral QAM AC    ipratropium-albuterol  1 ampule Inhalation Q4H WA    benztropine  0.5 mg Oral BID    cariprazine hcl  1.5 mg Oral Daily    haloperidol  0.5 mg Oral BID    melatonin  10 mg Oral Nightly    mirtazapine  15 mg Oral Nightly    sodium chloride flush  5-40 mL IntraVENous 2 times per day     Continuous Infusions:   sodium chloride       PRN Meds:perflutren lipid microspheres, perflutren lipid microspheres, perflutren lipid microspheres, sodium chloride flush, sodium chloride, ondansetron **OR** ondansetron, acetaminophen **OR** acetaminophen, magnesium hydroxide, cloNIDine    I/O last 3 completed shifts: In: 360 [P.O.:360]  Out: 0   I/O this shift:   In: 26 [P.O.:470]  Out: -       Objective:      Physical Exam:   /80   Pulse 70   Temp 97.2 °F (36.2 °C) (Temporal)   Resp 19   Wt 251 lb 1.6 oz (113.9 kg)   SpO2 97%   BMI 37.08 kg/m²   CONSTITUTIONAL:  awake, alert, cooperative, no apparent distress, and appears stated age  HEAD:  normocepalic, without obvious abnormality, atraumatic  NECK:  Supple, symmetrical, trachea midline, no adenopathy, thyroid symmetric, not enlarged and no tenderness, skin normal  LUNGS:  No increased work of breathing, No accessory muscle use or intercostal retractions, good air exchange, clear to auscultation bilaterally, no crackles or wheezing  CARDIOVASCULAR:  Normal apical impulse, regular rate and rhythm, normal S1 and S2, no S3 or S4, and no murmur noted, no edema, no JVD, no carotid bruit. ABDOMEN:  Soft, nontender, no masses, no hepatomegaly, no splenomegaly, BS+  MUSCULOSKELETAL:  No clubbing no cyanosis. there is no redness, warmth, or swelling of the joints  full range of motion noted  NEUROLOGIC:  Alert, awake,oriented x3  SKIN:  no bruising or bleeding, normal skin color, texture, turgor and no redness, warmth, or swelling      Cardiographics  I personally reviewed the telemetry monitor strips with the following interpretation: Sinus rhythm    Echocardiogram: 10/14/2022,Summary   Normal left ventricle size and systolic function. Stage I diastolic dysfunction. Occasional RV slight diastolic dimpling   RA diastolic collapse. Moderate size pericardial effusion. Early signs of hemodynamic significance. 10/12/2020, summary   Mildly dilated left ventricular chamber size. Low normal left ventricular systolic function. Visually estimated LVEF is 50%. No wall motion abnormalities. Grade II diastolic dysfunction. Normal right ventricle structure and function. The left atrium is mildly dilated. Normal right atrial size   Unable to estimate PA pressure. There is a small to moderate anterior pericardial effusion and a trace   posterior effusion. There is no evidence of jed tamponade. There is some evidence of a pre-tamponade physiology. No comparison study available. Imaging  CTA PULMONARY W CONTRAST   Final Result   1. No acute pulmonary embolus. 2.  No aneurysm formation or dissection of the thoracic aorta. 3.  No acute pulmonary process. 4.  There is a small size right atrium and the small size left atrium.       5.  Moderate pericardial effusion with prominent right brachial/axillary   vein, prominent IVC/hepatic veins with IV contrast reflux into hepatic veins   which indicates compromise of the right-sided heart function. 6.  Moderate pericardial effusion. 7.  Combination of described findings indicates cardiac tamponade physiology. Cardiac tamponade is a clinical diagnosis. Correlation with clinical data   and clinical examination needed. Can further evaluate with echocardiogram.      8.  Cardiac tamponade is a clinical diagnosis. Please correlate with   clinical examination and findings. Further evaluation with echocardiogram   can be helpful. 9.  Also noted is a pattern of pattern of concentric hypertrophy of the left   ventricle. Preliminary report was given to Dr. Gabriela Dsouza, [facility] [facility] physician in [facility]. RECOMMENDATIONS:   ence of pulmonary embolism or acute pulmonary abnormality. XR CHEST PORTABLE   Final Result   No acute process. Lab Review   Lab Results   Component Value Date/Time     10/17/2022 06:20 AM    K 3.5 10/17/2022 06:20 AM     10/17/2022 06:20 AM    CO2 26 10/17/2022 06:20 AM    BUN 18 10/17/2022 06:20 AM    CREATININE 0.7 10/17/2022 06:20 AM    GLUCOSE 94 10/17/2022 06:20 AM    GLUCOSE 99 01/26/2011 02:20 PM    CALCIUM 8.7 10/17/2022 06:20 AM     Lab Results   Component Value Date/Time    WBC 16.3 10/17/2022 06:20 AM    HGB 14.1 10/17/2022 06:20 AM    HCT 44.1 10/17/2022 06:20 AM    MCV 75.6 10/17/2022 06:20 AM     10/17/2022 06:20 AM     I have personally reviewed the laboratory, cardiac diagnostic and radiographic testing as outlined above:    Assessment:     1. Pericardial effusion: Small to moderate, repeat echo today showed no significant changes  2. Hypertension: Not well controlled, will adjust medications  3. Rhinovirus upper respiratory tract infection  4. Cocaine use  5. Obesity:    Recommendations:     1.   Continue current treatment  2.  Echocardiogram in 6 to 8

## 2022-10-17 NOTE — PROGRESS NOTES
Hospitalist Progress Note      SYNOPSIS: Patient admitted on 10/12/2022 for Pericardial effusion      SUBJECTIVE:  Patient seen and examined at bedside. Denies any new concerns. Denies any shortness of breath, cough, chest pain today. Records reviewed. Stable overnight. No other overnight issues reported. Temp (24hrs), Av.2 °F (36.2 °C), Min:97 °F (36.1 °C), Max:97.2 °F (36.2 °C)    DIET: ADULT DIET; Regular; No Caffeine  CODE: Full Code    Intake/Output Summary (Last 24 hours) at 10/17/2022 0829  Last data filed at 10/17/2022 0605  Gross per 24 hour   Intake 360 ml   Output 0 ml   Net 360 ml       OBJECTIVE:    /80   Pulse 70   Temp 97.2 °F (36.2 °C) (Temporal)   Resp 19   Wt 251 lb 1.6 oz (113.9 kg)   SpO2 94%   BMI 37.08 kg/m²     General appearance: No apparent distress, appears stated age and cooperative. Respiratory: Bilateral air entry fair. Cardiovascular: Regular rate rhythm, normal S1-S2  Abdomen: Soft, nontender, nondistended  Musculoskeletal: No bilateral lower extremity edema. Neurologic: awake, alert and following commands     Assessment and plan    Patient, 82-year-old with past medical history of hypertension, schizoaffective schizophrenia, polysubstance abuse including cocaine/marijuana, nicotine dependence, was admitted on 10/12/2022 for evaluation of chest pain. Initial troponin 10 then trended down to 8. EKG shows normal sinus rhythm. Noted to have elevated D-dimer and underwent CTA chest which showed no PE but moderate pericardial effusion with possible tamponade physiology. Echocardiogram done on 10/12/2022 showed moderately dilated left ventricular chamber size, low normal left ventricular systolic function with EF 50%, no wall motion abnormalities, grade 2 diastolic dysfunction. It also showed small to moderate anterior pericardial effusion and a trace posterior effusion. No evidence of jed tamponade.  There is some evidence of a pre-tamponade physiology. Cardiology consulted. Patient symptoms deemed likely secondary to viral etiology/asthma/COPD exacerbation in setting of active cocaine use. ASSESSMENT:  Pericardial effusion with no evidence of cardiac tamponade  Acute asthma/COPD exacerbation/rhinovirus URI  Hypertension  Schizoaffective schizophrenia  Polysubstance abuse including marijuana/cocaine  Nicotine dependence  Morbid obesity with BMI 41.35     PLAN:  Cardiology evaluation appreciated. No clinical evidence of tamponade  Patient had limited echo 10/14/2022 which still shows moderate-sized pericardial effusion with early signs of hemodynamic significance but no tamponade. As per cardiology, to continue steroids/colchicine for now. Recommend repeat limited echo on Monday, 10/17/2022  Currently saturating well on room air  Continue p.o. prednisone/continue DuoNeb  Continue amlodipine, dose was increased to 10 mg daily. Cardiology added lisinopril as well. Continue Cogentin/Vraylar/Haldol/Remeron  Advised smoking cessation/abstinence from illicit drugs. Had offered nicotine patch but patient refused. Continue PPI  DVT prophylaxis-Lovenox subcu      Discharge disposition-likely discharge home Monday, 10/17/2022 if okay with cardiology after repeating limited echo.       Medications:  REVIEWED DAILY    Infusion Medications    sodium chloride       Scheduled Medications    lisinopril  20 mg Oral BID    nicotine  1 patch TransDERmal Daily    predniSONE  40 mg Oral Daily    amLODIPine  10 mg Oral Daily    colchicine  0.6 mg Oral Daily    pantoprazole  40 mg Oral QAM AC    ipratropium-albuterol  1 ampule Inhalation Q4H WA    benztropine  0.5 mg Oral BID    cariprazine hcl  1.5 mg Oral Daily    haloperidol  0.5 mg Oral BID    melatonin  10 mg Oral Nightly    mirtazapine  15 mg Oral Nightly    sodium chloride flush  5-40 mL IntraVENous 2 times per day     PRN Meds: perflutren lipid microspheres, perflutren lipid microspheres, perflutren lipid microspheres, sodium chloride flush, sodium chloride, ondansetron **OR** ondansetron, acetaminophen **OR** acetaminophen, magnesium hydroxide, cloNIDine    Labs:     Recent Labs     10/15/22  0528 10/16/22  0522 10/17/22  0620   WBC 9.8 14.5* 16.3*   HGB 13.0 13.4 14.1   HCT 41.2 41.2 44.1    442 392       Recent Labs     10/15/22  0528 10/16/22  0522 10/17/22  0620    139 138   K 4.3 3.6 3.5    103 100   CO2 26 25 26   BUN 20 20 18   CREATININE 0.7 0.8 0.7   CALCIUM 8.6 8.4* 8.7       No results for input(s): PROT, ALB, ALKPHOS, ALT, AST, BILITOT, AMYLASE, LIPASE in the last 72 hours. No results for input(s): INR in the last 72 hours. No results for input(s): Richard Pina in the last 72 hours. Chronic labs:    Lab Results   Component Value Date    CHOL 245 (H) 10/13/2022    TRIG 89 10/13/2022    HDL 55 10/13/2022    LDLCALC 172 (H) 10/13/2022    TSH 0.966 05/05/2018    LABA1C 6.2 (H) 10/13/2022       Radiology: REVIEWED DAILY    +++++++++++++++++++++++++++++++++++++++++++++++++  Valarie Jimenez MD  [facility] Physician - [address] [facility], [place]  +++++++++++++++++++++++++++++++++++++++++++++++++  NOTE: This report was transcribed using voice recognition software. Every effort was made to ensure accuracy; however, inadvertent computerized transcription errors may be present.

## 2022-10-17 NOTE — PLAN OF CARE
Problem: Discharge Planning  Goal: Discharge to home or other facility with appropriate resources  Outcome: Progressing     Problem: Respiratory - Adult  Goal: Achieves optimal ventilation and oxygenation  Outcome: Progressing     Problem: Pain  Goal: Verbalizes/displays adequate comfort level or baseline comfort level  Outcome: Progressing  Flowsheets (Taken 10/16/2022 0823 by Angélica Werner RN)  Verbalizes/displays adequate comfort level or baseline comfort level: Assess pain using appropriate pain scale     Problem: Safety - Adult  Goal: Free from fall injury  Outcome: Progressing

## 2022-10-17 NOTE — CARE COORDINATION
Discharge Order is in. Donn peterson [facility] ([phone]) [id number]. Requested  call the Nurse's at arrival. Informed Charge RN and Floor RN of transport scheduled. Discharge plan s to return home.    ADRIANA Caal.  [phone]

## 2023-09-05 ENCOUNTER — HOSPITAL ENCOUNTER (EMERGENCY)
Age: 58
Discharge: LEFT BEFORE DISCHARGE | End: 2023-09-06
Attending: EMERGENCY MEDICINE
Payer: MEDICAID

## 2023-09-05 ENCOUNTER — APPOINTMENT (OUTPATIENT)
Dept: GENERAL RADIOLOGY | Age: 58
End: 2023-09-05
Payer: MEDICAID

## 2023-09-05 VITALS
OXYGEN SATURATION: 100 % | DIASTOLIC BLOOD PRESSURE: 70 MMHG | HEART RATE: 95 BPM | SYSTOLIC BLOOD PRESSURE: 132 MMHG | WEIGHT: 250 LBS | BODY MASS INDEX: 36.92 KG/M2 | TEMPERATURE: 97.4 F | RESPIRATION RATE: 18 BRPM

## 2023-09-05 DIAGNOSIS — R05.1 ACUTE COUGH: Primary | ICD-10-CM

## 2023-09-05 DIAGNOSIS — J40 BRONCHITIS: ICD-10-CM

## 2023-09-05 DIAGNOSIS — N39.0 UTI (URINARY TRACT INFECTION), UNCOMPLICATED: ICD-10-CM

## 2023-09-05 LAB
ALBUMIN SERPL-MCNC: 4.1 G/DL (ref 3.5–5.2)
ALP SERPL-CCNC: 105 U/L (ref 35–104)
ALT SERPL-CCNC: 6 U/L (ref 0–32)
ANION GAP SERPL CALCULATED.3IONS-SCNC: 11 MMOL/L (ref 7–16)
AST SERPL-CCNC: 12 U/L (ref 0–31)
B PARAP IS1001 DNA NPH QL NAA+NON-PROBE: NOT DETECTED
B PERT DNA SPEC QL NAA+PROBE: NOT DETECTED
BACTERIA URNS QL MICRO: ABNORMAL
BASOPHILS # BLD: 0.03 K/UL (ref 0–0.2)
BASOPHILS NFR BLD: 0 % (ref 0–2)
BILIRUB SERPL-MCNC: 0.9 MG/DL (ref 0–1.2)
BILIRUB UR QL STRIP: ABNORMAL
BUN SERPL-MCNC: 10 MG/DL (ref 6–20)
C PNEUM DNA NPH QL NAA+NON-PROBE: NOT DETECTED
CALCIUM SERPL-MCNC: 8.9 MG/DL (ref 8.6–10.2)
CHLORIDE SERPL-SCNC: 99 MMOL/L (ref 98–107)
CLARITY UR: ABNORMAL
CO2 SERPL-SCNC: 28 MMOL/L (ref 22–29)
COLOR UR: YELLOW
CREAT SERPL-MCNC: 0.8 MG/DL (ref 0.5–1)
EOSINOPHIL # BLD: 0.56 K/UL (ref 0.05–0.5)
EOSINOPHILS RELATIVE PERCENT: 4 % (ref 0–6)
EPI CELLS #/AREA URNS HPF: ABNORMAL /HPF
ERYTHROCYTE [DISTWIDTH] IN BLOOD BY AUTOMATED COUNT: 15.1 % (ref 11.5–15)
FLUAV RNA NPH QL NAA+NON-PROBE: NOT DETECTED
FLUBV RNA NPH QL NAA+NON-PROBE: NOT DETECTED
GFR SERPL CREATININE-BSD FRML MDRD: >60 ML/MIN/1.73M2
GLUCOSE SERPL-MCNC: 96 MG/DL (ref 74–99)
GLUCOSE UR STRIP-MCNC: NEGATIVE MG/DL
HADV DNA NPH QL NAA+NON-PROBE: NOT DETECTED
HCOV 229E RNA NPH QL NAA+NON-PROBE: NOT DETECTED
HCOV HKU1 RNA NPH QL NAA+NON-PROBE: NOT DETECTED
HCOV NL63 RNA NPH QL NAA+NON-PROBE: NOT DETECTED
HCOV OC43 RNA NPH QL NAA+NON-PROBE: NOT DETECTED
HCT VFR BLD AUTO: 37.5 % (ref 34–48)
HGB BLD-MCNC: 12.2 G/DL (ref 11.5–15.5)
HGB UR QL STRIP.AUTO: ABNORMAL
HMPV RNA NPH QL NAA+NON-PROBE: NOT DETECTED
HPIV1 RNA NPH QL NAA+NON-PROBE: NOT DETECTED
HPIV2 RNA NPH QL NAA+NON-PROBE: NOT DETECTED
HPIV3 RNA NPH QL NAA+NON-PROBE: NOT DETECTED
HPIV4 RNA NPH QL NAA+NON-PROBE: NOT DETECTED
IMM GRANULOCYTES # BLD AUTO: 0.07 K/UL (ref 0–0.58)
IMM GRANULOCYTES NFR BLD: 0 % (ref 0–5)
KETONES UR STRIP-MCNC: ABNORMAL MG/DL
LACTATE BLDV-SCNC: 1 MMOL/L (ref 0.5–1.9)
LEUKOCYTE ESTERASE UR QL STRIP: ABNORMAL
LYMPHOCYTES NFR BLD: 2.03 K/UL (ref 1.5–4)
LYMPHOCYTES RELATIVE PERCENT: 13 % (ref 20–42)
M PNEUMO DNA NPH QL NAA+NON-PROBE: NOT DETECTED
MCH RBC QN AUTO: 25.9 PG (ref 26–35)
MCHC RBC AUTO-ENTMCNC: 32.5 G/DL (ref 32–34.5)
MCV RBC AUTO: 79.6 FL (ref 80–99.9)
MONOCYTES NFR BLD: 0.76 K/UL (ref 0.1–0.95)
MONOCYTES NFR BLD: 5 % (ref 2–12)
NEUTROPHILS NFR BLD: 78 % (ref 43–80)
NEUTS SEG NFR BLD: 12.12 K/UL (ref 1.8–7.3)
NITRITE UR QL STRIP: NEGATIVE
PH UR STRIP: 6 [PH] (ref 5–9)
PLATELET # BLD AUTO: 318 K/UL (ref 130–450)
PMV BLD AUTO: 11.2 FL (ref 7–12)
POTASSIUM SERPL-SCNC: 3.6 MMOL/L (ref 3.5–5)
PROT SERPL-MCNC: 8.2 G/DL (ref 6.4–8.3)
PROT UR STRIP-MCNC: ABNORMAL MG/DL
RBC # BLD AUTO: 4.71 M/UL (ref 3.5–5.5)
RBC #/AREA URNS HPF: ABNORMAL /HPF
RSV RNA NPH QL NAA+NON-PROBE: NOT DETECTED
RV+EV RNA NPH QL NAA+NON-PROBE: NOT DETECTED
SARS-COV-2 RNA NPH QL NAA+NON-PROBE: NOT DETECTED
SODIUM SERPL-SCNC: 138 MMOL/L (ref 132–146)
SP GR UR STRIP: 1.01 (ref 1–1.03)
SPECIMEN DESCRIPTION: NORMAL
TROPONIN I SERPL HS-MCNC: <6 NG/L (ref 0–9)
UROBILINOGEN UR STRIP-ACNC: 2 EU/DL (ref 0–1)
WBC #/AREA URNS HPF: ABNORMAL /HPF
WBC OTHER # BLD: 15.6 K/UL (ref 4.5–11.5)

## 2023-09-05 PROCEDURE — 84484 ASSAY OF TROPONIN QUANT: CPT

## 2023-09-05 PROCEDURE — 93005 ELECTROCARDIOGRAM TRACING: CPT | Performed by: EMERGENCY MEDICINE

## 2023-09-05 PROCEDURE — 94664 DEMO&/EVAL PT USE INHALER: CPT

## 2023-09-05 PROCEDURE — 99285 EMERGENCY DEPT VISIT HI MDM: CPT

## 2023-09-05 PROCEDURE — 71045 X-RAY EXAM CHEST 1 VIEW: CPT

## 2023-09-05 PROCEDURE — 80053 COMPREHEN METABOLIC PANEL: CPT

## 2023-09-05 PROCEDURE — 94640 AIRWAY INHALATION TREATMENT: CPT

## 2023-09-05 PROCEDURE — 87040 BLOOD CULTURE FOR BACTERIA: CPT

## 2023-09-05 PROCEDURE — 85025 COMPLETE CBC W/AUTO DIFF WBC: CPT

## 2023-09-05 PROCEDURE — 0202U NFCT DS 22 TRGT SARS-COV-2: CPT

## 2023-09-05 PROCEDURE — 81001 URINALYSIS AUTO W/SCOPE: CPT

## 2023-09-05 PROCEDURE — 83605 ASSAY OF LACTIC ACID: CPT

## 2023-09-05 PROCEDURE — 36415 COLL VENOUS BLD VENIPUNCTURE: CPT

## 2023-09-05 PROCEDURE — 6370000000 HC RX 637 (ALT 250 FOR IP): Performed by: EMERGENCY MEDICINE

## 2023-09-05 RX ORDER — BUDESONIDE AND FORMOTEROL FUMARATE DIHYDRATE 160; 4.5 UG/1; UG/1
2 AEROSOL RESPIRATORY (INHALATION) 2 TIMES DAILY
Qty: 30.6 G | Refills: 0 | Status: SHIPPED | OUTPATIENT
Start: 2023-09-05

## 2023-09-05 RX ORDER — DOXYCYCLINE HYCLATE 100 MG
100 TABLET ORAL 2 TIMES DAILY
Qty: 20 TABLET | Refills: 0 | Status: SHIPPED | OUTPATIENT
Start: 2023-09-05 | End: 2023-09-15

## 2023-09-05 RX ORDER — ALBUTEROL SULFATE 90 UG/1
2 AEROSOL, METERED RESPIRATORY (INHALATION) 4 TIMES DAILY PRN
Qty: 18 G | Refills: 0 | Status: SHIPPED | OUTPATIENT
Start: 2023-09-05

## 2023-09-05 RX ORDER — CEFDINIR 300 MG/1
300 CAPSULE ORAL 2 TIMES DAILY
Qty: 20 CAPSULE | Refills: 0 | Status: SHIPPED | OUTPATIENT
Start: 2023-09-05 | End: 2023-09-15

## 2023-09-05 RX ORDER — IPRATROPIUM BROMIDE AND ALBUTEROL SULFATE 2.5; .5 MG/3ML; MG/3ML
1 SOLUTION RESPIRATORY (INHALATION)
Status: COMPLETED | OUTPATIENT
Start: 2023-09-05 | End: 2023-09-05

## 2023-09-05 RX ADMIN — IPRATROPIUM BROMIDE AND ALBUTEROL SULFATE 1 DOSE: .5; 3 SOLUTION RESPIRATORY (INHALATION) at 21:20

## 2023-09-05 RX ADMIN — IPRATROPIUM BROMIDE AND ALBUTEROL SULFATE 1 DOSE: .5; 3 SOLUTION RESPIRATORY (INHALATION) at 21:23

## 2023-09-05 RX ADMIN — IPRATROPIUM BROMIDE AND ALBUTEROL SULFATE 1 DOSE: .5; 3 SOLUTION RESPIRATORY (INHALATION) at 21:22

## 2023-09-06 LAB
EKG ATRIAL RATE: 89 BPM
EKG P AXIS: 44 DEGREES
EKG P-R INTERVAL: 140 MS
EKG Q-T INTERVAL: 388 MS
EKG QRS DURATION: 86 MS
EKG QTC CALCULATION (BAZETT): 472 MS
EKG R AXIS: 9 DEGREES
EKG T AXIS: 27 DEGREES
EKG VENTRICULAR RATE: 89 BPM

## 2023-09-06 PROCEDURE — 93010 ELECTROCARDIOGRAM REPORT: CPT | Performed by: INTERNAL MEDICINE

## 2023-09-10 LAB
MICROORGANISM SPEC CULT: NORMAL
MICROORGANISM SPEC CULT: NORMAL
SERVICE CMNT-IMP: NORMAL
SERVICE CMNT-IMP: NORMAL
SPECIMEN DESCRIPTION: NORMAL
SPECIMEN DESCRIPTION: NORMAL

## 2023-10-10 ENCOUNTER — OFFICE VISIT (OUTPATIENT)
Age: 58
End: 2023-10-10
Payer: MEDICAID

## 2023-10-10 VITALS
SYSTOLIC BLOOD PRESSURE: 122 MMHG | BODY MASS INDEX: 25.48 KG/M2 | TEMPERATURE: 97.3 F | OXYGEN SATURATION: 97 % | WEIGHT: 172 LBS | DIASTOLIC BLOOD PRESSURE: 70 MMHG | RESPIRATION RATE: 12 BRPM | HEIGHT: 69 IN | HEART RATE: 92 BPM

## 2023-10-10 DIAGNOSIS — F25.9 SCHIZO-AFFECTIVE TYPE SCHIZOPHRENIA, CHRONIC STATE (HCC): ICD-10-CM

## 2023-10-10 DIAGNOSIS — R61 NIGHT SWEATS: ICD-10-CM

## 2023-10-10 DIAGNOSIS — R63.4 WEIGHT LOSS, UNINTENTIONAL: Primary | ICD-10-CM

## 2023-10-10 DIAGNOSIS — G89.29 OTHER CHRONIC PAIN: ICD-10-CM

## 2023-10-10 DIAGNOSIS — E78.2 MIXED HYPERLIPIDEMIA: ICD-10-CM

## 2023-10-10 DIAGNOSIS — I10 PRIMARY HYPERTENSION: ICD-10-CM

## 2023-10-10 DIAGNOSIS — L29.9 GENERALIZED PRURITUS: ICD-10-CM

## 2023-10-10 LAB
ABSOLUTE IMMATURE GRANULOCYTE: <0.03 K/UL (ref 0–0.58)
ALBUMIN SERPL-MCNC: 4 G/DL (ref 3.5–5.2)
ALP BLD-CCNC: 91 U/L (ref 35–104)
ALT SERPL-CCNC: <5 U/L (ref 0–32)
ANION GAP SERPL CALCULATED.3IONS-SCNC: 14 MMOL/L (ref 7–16)
AST SERPL-CCNC: 13 U/L (ref 0–31)
BASOPHILS ABSOLUTE: 0.04 K/UL (ref 0–0.2)
BASOPHILS RELATIVE PERCENT: 1 % (ref 0–2)
BILIRUB SERPL-MCNC: 0.4 MG/DL (ref 0–1.2)
BUN BLDV-MCNC: 8 MG/DL (ref 6–20)
C-REACTIVE PROTEIN: 3 MG/L (ref 0–5)
CALCIUM SERPL-MCNC: 9.1 MG/DL (ref 8.6–10.2)
CHLORIDE BLD-SCNC: 101 MMOL/L (ref 98–107)
CHOLESTEROL: 227 MG/DL
CO2: 21 MMOL/L (ref 22–29)
CREAT SERPL-MCNC: 0.8 MG/DL (ref 0.5–1)
EOSINOPHILS ABSOLUTE: 0.35 K/UL (ref 0.05–0.5)
EOSINOPHILS RELATIVE PERCENT: 5 % (ref 0–6)
GFR SERPL CREATININE-BSD FRML MDRD: >60 ML/MIN/1.73M2
GLUCOSE BLD-MCNC: 105 MG/DL (ref 74–99)
HBA1C MFR BLD: 5.8 % (ref 4–5.6)
HCT VFR BLD CALC: 38.6 % (ref 34–48)
HDLC SERPL-MCNC: 55 MG/DL
HEMOGLOBIN: 12.7 G/DL (ref 11.5–15.5)
IMMATURE GRANULOCYTES: 0 % (ref 0–5)
LDL CHOLESTEROL: 150 MG/DL
LYMPHOCYTES ABSOLUTE: 3.9 K/UL (ref 1.5–4)
LYMPHOCYTES RELATIVE PERCENT: 52 % (ref 20–42)
MCH RBC QN AUTO: 26.2 PG (ref 26–35)
MCHC RBC AUTO-ENTMCNC: 32.9 G/DL (ref 32–34.5)
MCV RBC AUTO: 79.8 FL (ref 80–99.9)
MONOCYTES ABSOLUTE: 0.49 K/UL (ref 0.1–0.95)
MONOCYTES RELATIVE PERCENT: 7 % (ref 2–12)
NEUTROPHILS ABSOLUTE: 2.78 K/UL (ref 1.8–7.3)
NEUTROPHILS RELATIVE PERCENT: 37 % (ref 43–80)
PDW BLD-RTO: 17 % (ref 11.5–15)
PLATELET # BLD: 343 K/UL (ref 130–450)
PMV BLD AUTO: 11.5 FL (ref 7–12)
POTASSIUM SERPL-SCNC: 3.8 MMOL/L (ref 3.5–5)
RBC # BLD: 4.84 M/UL (ref 3.5–5.5)
SODIUM BLD-SCNC: 136 MMOL/L (ref 132–146)
TOTAL PROTEIN: 7.8 G/DL (ref 6.4–8.3)
TRIGL SERPL-MCNC: 110 MG/DL
TSH SERPL DL<=0.05 MIU/L-ACNC: 1.14 UIU/ML (ref 0.27–4.2)
VITAMIN D 25-HYDROXY: 7 NG/ML (ref 30–100)
VLDLC SERPL CALC-MCNC: 22 MG/DL
WBC # BLD: 7.6 K/UL (ref 4.5–11.5)

## 2023-10-10 PROCEDURE — 4004F PT TOBACCO SCREEN RCVD TLK: CPT | Performed by: FAMILY MEDICINE

## 2023-10-10 PROCEDURE — 3074F SYST BP LT 130 MM HG: CPT | Performed by: FAMILY MEDICINE

## 2023-10-10 PROCEDURE — 3078F DIAST BP <80 MM HG: CPT | Performed by: FAMILY MEDICINE

## 2023-10-10 PROCEDURE — G8427 DOCREV CUR MEDS BY ELIG CLIN: HCPCS | Performed by: FAMILY MEDICINE

## 2023-10-10 PROCEDURE — 3017F COLORECTAL CA SCREEN DOC REV: CPT | Performed by: FAMILY MEDICINE

## 2023-10-10 PROCEDURE — 99204 OFFICE O/P NEW MOD 45 MIN: CPT | Performed by: FAMILY MEDICINE

## 2023-10-10 PROCEDURE — 36415 COLL VENOUS BLD VENIPUNCTURE: CPT | Performed by: FAMILY MEDICINE

## 2023-10-10 PROCEDURE — G8484 FLU IMMUNIZE NO ADMIN: HCPCS | Performed by: FAMILY MEDICINE

## 2023-10-10 PROCEDURE — G8419 CALC BMI OUT NRM PARAM NOF/U: HCPCS | Performed by: FAMILY MEDICINE

## 2023-10-10 RX ORDER — BUSPIRONE HYDROCHLORIDE 10 MG/1
10 TABLET ORAL 3 TIMES DAILY
COMMUNITY
End: 2023-10-10 | Stop reason: SDUPTHER

## 2023-10-10 RX ORDER — CETIRIZINE HYDROCHLORIDE 10 MG/1
10 TABLET ORAL DAILY
Qty: 90 TABLET | Refills: 1 | Status: SHIPPED | OUTPATIENT
Start: 2023-10-10

## 2023-10-10 RX ORDER — TRAZODONE HYDROCHLORIDE 150 MG/1
150 TABLET ORAL NIGHTLY
Qty: 30 TABLET | Refills: 0 | Status: SHIPPED | OUTPATIENT
Start: 2023-10-10

## 2023-10-10 RX ORDER — ACETAMINOPHEN 500 MG
500 TABLET ORAL 4 TIMES DAILY PRN
Qty: 360 TABLET | Refills: 1 | Status: SHIPPED | OUTPATIENT
Start: 2023-10-10

## 2023-10-10 RX ORDER — GABAPENTIN 300 MG/1
300 CAPSULE ORAL 3 TIMES DAILY
COMMUNITY
End: 2023-10-10 | Stop reason: SDUPTHER

## 2023-10-10 RX ORDER — TRAZODONE HYDROCHLORIDE 150 MG/1
TABLET ORAL
COMMUNITY
Start: 2023-07-19 | End: 2023-10-10 | Stop reason: SDUPTHER

## 2023-10-10 RX ORDER — BUSPIRONE HYDROCHLORIDE 10 MG/1
10 TABLET ORAL 3 TIMES DAILY
Qty: 90 TABLET | Refills: 0 | Status: SHIPPED | OUTPATIENT
Start: 2023-10-10

## 2023-10-10 RX ORDER — GABAPENTIN 300 MG/1
300 CAPSULE ORAL 3 TIMES DAILY
Qty: 90 CAPSULE | Refills: 0 | Status: SHIPPED | OUTPATIENT
Start: 2023-10-10 | End: 2023-11-09

## 2023-10-10 RX ORDER — MELOXICAM 15 MG/1
TABLET ORAL
COMMUNITY
Start: 2023-08-11

## 2023-10-10 SDOH — ECONOMIC STABILITY: FOOD INSECURITY: WITHIN THE PAST 12 MONTHS, YOU WORRIED THAT YOUR FOOD WOULD RUN OUT BEFORE YOU GOT MONEY TO BUY MORE.: OFTEN TRUE

## 2023-10-10 SDOH — ECONOMIC STABILITY: HOUSING INSECURITY
IN THE LAST 12 MONTHS, WAS THERE A TIME WHEN YOU DID NOT HAVE A STEADY PLACE TO SLEEP OR SLEPT IN A SHELTER (INCLUDING NOW)?: YES

## 2023-10-10 SDOH — ECONOMIC STABILITY: INCOME INSECURITY: HOW HARD IS IT FOR YOU TO PAY FOR THE VERY BASICS LIKE FOOD, HOUSING, MEDICAL CARE, AND HEATING?: SOMEWHAT HARD

## 2023-10-10 SDOH — ECONOMIC STABILITY: FOOD INSECURITY: WITHIN THE PAST 12 MONTHS, THE FOOD YOU BOUGHT JUST DIDN'T LAST AND YOU DIDN'T HAVE MONEY TO GET MORE.: OFTEN TRUE

## 2023-10-10 ASSESSMENT — ENCOUNTER SYMPTOMS
DIARRHEA: 0
ANAL BLEEDING: 0
ABDOMINAL PAIN: 0
NAUSEA: 0
TROUBLE SWALLOWING: 1
SHORTNESS OF BREATH: 0
WHEEZING: 0
RHINORRHEA: 0
CONSTIPATION: 0
COUGH: 0
BLOOD IN STOOL: 0
VOMITING: 0

## 2023-10-10 ASSESSMENT — PATIENT HEALTH QUESTIONNAIRE - PHQ9
SUM OF ALL RESPONSES TO PHQ QUESTIONS 1-9: 16
4. FEELING TIRED OR HAVING LITTLE ENERGY: 3
SUM OF ALL RESPONSES TO PHQ QUESTIONS 1-9: 16
7. TROUBLE CONCENTRATING ON THINGS, SUCH AS READING THE NEWSPAPER OR WATCHING TELEVISION: 0
5. POOR APPETITE OR OVEREATING: 2
SUM OF ALL RESPONSES TO PHQ QUESTIONS 1-9: 14
8. MOVING OR SPEAKING SO SLOWLY THAT OTHER PEOPLE COULD HAVE NOTICED. OR THE OPPOSITE, BEING SO FIGETY OR RESTLESS THAT YOU HAVE BEEN MOVING AROUND A LOT MORE THAN USUAL: 0
6. FEELING BAD ABOUT YOURSELF - OR THAT YOU ARE A FAILURE OR HAVE LET YOURSELF OR YOUR FAMILY DOWN: 3
10. IF YOU CHECKED OFF ANY PROBLEMS, HOW DIFFICULT HAVE THESE PROBLEMS MADE IT FOR YOU TO DO YOUR WORK, TAKE CARE OF THINGS AT HOME, OR GET ALONG WITH OTHER PEOPLE: 2
SUM OF ALL RESPONSES TO PHQ9 QUESTIONS 1 & 2: 3
2. FEELING DOWN, DEPRESSED OR HOPELESS: 3
SUM OF ALL RESPONSES TO PHQ QUESTIONS 1-9: 16
3. TROUBLE FALLING OR STAYING ASLEEP: 3
9. THOUGHTS THAT YOU WOULD BE BETTER OFF DEAD, OR OF HURTING YOURSELF: 2
1. LITTLE INTEREST OR PLEASURE IN DOING THINGS: 0

## 2023-10-10 ASSESSMENT — COLUMBIA-SUICIDE SEVERITY RATING SCALE - C-SSRS
1. WITHIN THE PAST MONTH, HAVE YOU WISHED YOU WERE DEAD OR WISHED YOU COULD GO TO SLEEP AND NOT WAKE UP?: YES
2. HAVE YOU ACTUALLY HAD ANY THOUGHTS OF KILLING YOURSELF?: NO
6. HAVE YOU EVER DONE ANYTHING, STARTED TO DO ANYTHING, OR PREPARED TO DO ANYTHING TO END YOUR LIFE?: NO
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
## 2023-10-11 LAB
HAV IGM SER IA-ACNC: NONREACTIVE
HEPATITIS B CORE IGM ANTIBODY: NONREACTIVE
HEPATITIS B SURF AG,XHBAGS: NONREACTIVE
HEPATITIS C ANTIBODY: NONREACTIVE
SEDIMENTATION RATE, ERYTHROCYTE: 33 MM/HR (ref 0–20)

## 2023-10-12 DIAGNOSIS — E55.9 VITAMIN D DEFICIENCY: Primary | ICD-10-CM

## 2023-10-12 RX ORDER — ERGOCALCIFEROL 1.25 MG/1
50000 CAPSULE ORAL WEEKLY
Qty: 12 CAPSULE | Refills: 1 | Status: SHIPPED | OUTPATIENT
Start: 2023-10-12

## 2023-10-12 RX ORDER — ATORVASTATIN CALCIUM 40 MG/1
40 TABLET, FILM COATED ORAL DAILY
Qty: 90 TABLET | Refills: 1 | Status: SHIPPED | OUTPATIENT
Start: 2023-10-12

## 2023-10-13 ENCOUNTER — TELEPHONE (OUTPATIENT)
Age: 58
End: 2023-10-13

## 2023-10-13 NOTE — TELEPHONE ENCOUNTER
Patient called in stating she has fallen 3 times yesterday. I advised the patient that I would bring to the attention of the doctor. Advised patient , per doctor, to go to the ER being that we have no available appts today. Advised patient that she can go to ER or a walk in clinic. Patient stated she was not going to go to the ER because she couldn't move from her bed. That she can only go from her bed to the bathroom. I then asked patient if she can only make it to the bathroom. Patient states she does not want to see anyone but Dr. Zahida Pearson. She stated she would find the strength to get into the elevator and go down to the car to come here. Patient began to talk to someone and when I asked if she was alone, she stated she was and she was talking to herself. I then advised patient, per Dr. Zahida Pearson, that I would be happy to call an ambulance for her. Patient then yelled she was not going in an ambulance and hung up the phone.

## 2023-10-17 ENCOUNTER — HOSPITAL ENCOUNTER (EMERGENCY)
Age: 58
Discharge: HOME | End: 2023-10-18
Attending: STUDENT IN AN ORGANIZED HEALTH CARE EDUCATION/TRAINING PROGRAM
Payer: MEDICAID

## 2023-10-17 ENCOUNTER — APPOINTMENT (OUTPATIENT)
Dept: GENERAL RADIOLOGY | Age: 58
End: 2023-10-17
Payer: MEDICAID

## 2023-10-17 ENCOUNTER — APPOINTMENT (OUTPATIENT)
Dept: CT IMAGING | Age: 58
End: 2023-10-17
Attending: STUDENT IN AN ORGANIZED HEALTH CARE EDUCATION/TRAINING PROGRAM
Payer: MEDICAID

## 2023-10-17 VITALS
DIASTOLIC BLOOD PRESSURE: 66 MMHG | RESPIRATION RATE: 16 BRPM | HEART RATE: 78 BPM | OXYGEN SATURATION: 99 % | TEMPERATURE: 98.2 F | SYSTOLIC BLOOD PRESSURE: 99 MMHG

## 2023-10-17 DIAGNOSIS — N30.01 ACUTE CYSTITIS WITH HEMATURIA: ICD-10-CM
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
## 2023-10-17 LAB
ALBUMIN SERPL-MCNC: 3.5 G/DL (ref 3.5–5.2)
ALP SERPL-CCNC: 88 U/L (ref 35–104)
ALT SERPL-CCNC: 6 U/L (ref 0–32)
ANION GAP SERPL CALCULATED.3IONS-SCNC: 10 MMOL/L (ref 7–16)
AST SERPL-CCNC: 16 U/L (ref 0–31)
BASOPHILS # BLD: 0.02 K/UL (ref 0–0.2)
BASOPHILS NFR BLD: 0 % (ref 0–2)
BILIRUB SERPL-MCNC: 0.7 MG/DL (ref 0–1.2)
BILIRUB UR QL STRIP: NEGATIVE
BUN SERPL-MCNC: 9 MG/DL (ref 6–20)
CALCIUM SERPL-MCNC: 8.6 MG/DL (ref 8.6–10.2)
CHLORIDE SERPL-SCNC: 101 MMOL/L (ref 98–107)
CLARITY UR: CLEAR
CO2 SERPL-SCNC: 27 MMOL/L (ref 22–29)
COLOR UR: YELLOW
CREAT SERPL-MCNC: 0.8 MG/DL (ref 0.5–1)
EOSINOPHIL # BLD: 0.43 K/UL (ref 0.05–0.5)
EOSINOPHILS RELATIVE PERCENT: 5 % (ref 0–6)
EPI CELLS #/AREA URNS HPF: ABNORMAL /HPF
ERYTHROCYTE [DISTWIDTH] IN BLOOD BY AUTOMATED COUNT: 17.6 % (ref 11.5–15)
GFR SERPL CREATININE-BSD FRML MDRD: >60 ML/MIN/1.73M2
GLUCOSE SERPL-MCNC: 109 MG/DL (ref 74–99)
GLUCOSE UR STRIP-MCNC: NEGATIVE MG/DL
HCT VFR BLD AUTO: 31 % (ref 34–48)
HGB BLD-MCNC: 10 G/DL (ref 11.5–15.5)
HGB UR QL STRIP.AUTO: ABNORMAL
IMM GRANULOCYTES # BLD AUTO: <0.03 K/UL (ref 0–0.58)
IMM GRANULOCYTES NFR BLD: 0 % (ref 0–5)
KETONES UR STRIP-MCNC: NEGATIVE MG/DL
LEUKOCYTE ESTERASE UR QL STRIP: ABNORMAL
LYMPHOCYTES NFR BLD: 2.94 K/UL (ref 1.5–4)
LYMPHOCYTES RELATIVE PERCENT: 34 % (ref 20–42)
MCH RBC QN AUTO: 26 PG (ref 26–35)
MCHC RBC AUTO-ENTMCNC: 32.3 G/DL (ref 32–34.5)
MCV RBC AUTO: 80.7 FL (ref 80–99.9)
MONOCYTES NFR BLD: 0.4 K/UL (ref 0.1–0.95)
MONOCYTES NFR BLD: 5 % (ref 2–12)
NEUTROPHILS NFR BLD: 57 % (ref 43–80)
NEUTS SEG NFR BLD: 4.95 K/UL (ref 1.8–7.3)
NITRITE UR QL STRIP: NEGATIVE
PH UR STRIP: 7.5 [PH] (ref 5–9)
PLATELET # BLD AUTO: 352 K/UL (ref 130–450)
PMV BLD AUTO: 10.3 FL (ref 7–12)
POTASSIUM SERPL-SCNC: 4.3 MMOL/L (ref 3.5–5)
PROT SERPL-MCNC: 6.7 G/DL (ref 6.4–8.3)
PROT UR STRIP-MCNC: NEGATIVE MG/DL
RBC # BLD AUTO: 3.84 M/UL (ref 3.5–5.5)
RBC #/AREA URNS HPF: ABNORMAL /HPF
SODIUM SERPL-SCNC: 138 MMOL/L (ref 132–146)
SP GR UR STRIP: 1.01 (ref 1–1.03)
TRICHOMONAS #/AREA URNS HPF: PRESENT /[HPF]
TROPONIN I SERPL HS-MCNC: 8 NG/L (ref 0–9)
UROBILINOGEN UR STRIP-ACNC: 1 EU/DL (ref 0–1)
WBC #/AREA URNS HPF: ABNORMAL /HPF
WBC OTHER # BLD: 8.8 K/UL (ref 4.5–11.5)

## 2023-10-17 PROCEDURE — 2580000003 HC RX 258: Performed by: STUDENT IN AN ORGANIZED HEALTH CARE EDUCATION/TRAINING PROGRAM

## 2023-10-17 PROCEDURE — 70450 CT HEAD/BRAIN W/O DYE: CPT

## 2023-10-17 PROCEDURE — 72125 CT NECK SPINE W/O DYE: CPT

## 2023-10-17 PROCEDURE — 73060 X-RAY EXAM OF HUMERUS: CPT

## 2023-10-17 PROCEDURE — 93005 ELECTROCARDIOGRAM TRACING: CPT | Performed by: STUDENT IN AN ORGANIZED HEALTH CARE EDUCATION/TRAINING PROGRAM

## 2023-10-17 PROCEDURE — 85025 COMPLETE CBC W/AUTO DIFF WBC: CPT

## 2023-10-17 PROCEDURE — 6360000002 HC RX W HCPCS: Performed by: STUDENT IN AN ORGANIZED HEALTH CARE EDUCATION/TRAINING PROGRAM

## 2023-10-17 PROCEDURE — 73552 X-RAY EXAM OF FEMUR 2/>: CPT

## 2023-10-17 PROCEDURE — 80053 COMPREHEN METABOLIC PANEL: CPT

## 2023-10-17 PROCEDURE — 73502 X-RAY EXAM HIP UNI 2-3 VIEWS: CPT

## 2023-10-17 PROCEDURE — 96374 THER/PROPH/DIAG INJ IV PUSH: CPT

## 2023-10-17 PROCEDURE — 6370000000 HC RX 637 (ALT 250 FOR IP): Performed by: STUDENT IN AN ORGANIZED HEALTH CARE EDUCATION/TRAINING PROGRAM

## 2023-10-17 PROCEDURE — 99285 EMERGENCY DEPT VISIT HI MDM: CPT

## 2023-10-17 PROCEDURE — 71045 X-RAY EXAM CHEST 1 VIEW: CPT

## 2023-10-17 PROCEDURE — 87086 URINE CULTURE/COLONY COUNT: CPT

## 2023-10-17 PROCEDURE — 84484 ASSAY OF TROPONIN QUANT: CPT

## 2023-10-17 PROCEDURE — 81001 URINALYSIS AUTO W/SCOPE: CPT

## 2023-10-17 RX ORDER — ACETAMINOPHEN 325 MG/1
650 TABLET ORAL ONCE
Status: COMPLETED | OUTPATIENT
Start: 2023-10-17 | End: 2023-10-17

## 2023-10-17 RX ADMIN — CEFTRIAXONE SODIUM 2000 MG: 2 INJECTION, POWDER, FOR SOLUTION INTRAMUSCULAR; INTRAVENOUS at 22:48

## 2023-10-17 RX ADMIN — ACETAMINOPHEN 650 MG: 325 TABLET ORAL at 22:48

## 2023-10-17 ASSESSMENT — PAIN DESCRIPTION - LOCATION: LOCATION: BACK;HEAD

## 2023-10-17 ASSESSMENT — PAIN - FUNCTIONAL ASSESSMENT: PAIN_FUNCTIONAL_ASSESSMENT: 0-10

## 2023-10-17 ASSESSMENT — PAIN DESCRIPTION - ORIENTATION: ORIENTATION: RIGHT

## 2023-10-17 NOTE — ED PROVIDER NOTES
IV Rocephin. Recommended patient complete course of antibiotic. I did discuss with her with the frequent falls potentially admission and going to a rehabilitation center. However, the patient states that she would like to go home with her walker and does not have any intention or plans to go to rehab. She states that she definitely does not want to be admitted. I did discuss with her how I suggested this with the frequency of falls that she was experiencing but she refused and states that she would like to go home. You shared decision making and patient was discharged home in stable condition with strict return precautions. CONSULTS: (Who and What was discussed)  None        I am the Primary Clinician of Record. FINAL IMPRESSION      1. Fall, initial encounter    2.  Acute cystitis with hematuria          DISPOSITION/PLAN     DISPOSITION Decision To Discharge 10/17/2023 11:05:06 PM      PATIENT REFERRED TO:  Abelino Manley, [address]     Schedule an appointment as soon as possible for a visit         DISCHARGE MEDICATIONS:  New Prescriptions    No medications on file       DISCONTINUED MEDICATIONS:  Discontinued Medications    No medications on file              (Please note that portions of this note were completed with a voice recognition program.  Efforts were made to edit the dictations but occasionally words are mis-transcribed.)    Angélica Escamilla DO (electronically signed)           Angélica Escamilla DO  10/18/23 [id number]

## 2023-10-18 LAB
EKG ATRIAL RATE: 80 BPM
EKG P AXIS: 57 DEGREES
EKG P-R INTERVAL: 138 MS
EKG Q-T INTERVAL: 396 MS
EKG QRS DURATION: 78 MS
EKG QTC CALCULATION (BAZETT): 456 MS
EKG R AXIS: 30 DEGREES
EKG T AXIS: 41 DEGREES
EKG VENTRICULAR RATE: 80 BPM

## 2023-10-18 PROCEDURE — 93010 ELECTROCARDIOGRAM REPORT: CPT | Performed by: INTERNAL MEDICINE

## 2023-10-18 NOTE — ED NOTES
Patient refusing to ambulate stating she will fall     [facility], [address] [place] [address]  10/17/23 6145

## 2023-10-19 LAB
MICROORGANISM SPEC CULT: ABNORMAL
MICROORGANISM SPEC CULT: ABNORMAL
SPECIMEN DESCRIPTION: ABNORMAL

## 2023-10-27 DIAGNOSIS — E78.2 MIXED HYPERLIPIDEMIA: Primary | ICD-10-CM

## 2024-02-14 ENCOUNTER — HOSPITAL ENCOUNTER (INPATIENT)
Age: 59
LOS: 9 days | Discharge: OTHER INSTITUTION | DRG: 750 | End: 2024-02-23
Attending: EMERGENCY MEDICINE | Admitting: PSYCHIATRY & NEUROLOGY
Payer: MEDICAID

## 2024-02-14 DIAGNOSIS — F32.A DEPRESSION WITH SUICIDAL IDEATION: Primary | ICD-10-CM

## 2024-02-14 DIAGNOSIS — R45.851 DEPRESSION WITH SUICIDAL IDEATION: Primary | ICD-10-CM

## 2024-02-14 PROBLEM — F32.9 MDD (MAJOR DEPRESSIVE DISORDER), SINGLE EPISODE: Status: ACTIVE | Noted: 2024-02-14

## 2024-02-14 LAB
ALBUMIN SERPL-MCNC: 3.8 G/DL (ref 3.5–5.2)
ALP SERPL-CCNC: 83 U/L (ref 35–104)
ALT SERPL-CCNC: 6 U/L (ref 0–32)
AMPHET UR QL SCN: NEGATIVE
ANION GAP SERPL CALCULATED.3IONS-SCNC: 8 MMOL/L (ref 7–16)
APAP SERPL-MCNC: <5 UG/ML (ref 10–30)
AST SERPL-CCNC: 13 U/L (ref 0–31)
BARBITURATES UR QL SCN: NEGATIVE
BASOPHILS # BLD: 0.02 K/UL (ref 0–0.2)
BASOPHILS NFR BLD: 0 % (ref 0–2)
BENZODIAZ UR QL: NEGATIVE
BILIRUB SERPL-MCNC: 0.2 MG/DL (ref 0–1.2)
BILIRUB UR QL STRIP: NEGATIVE
BUN SERPL-MCNC: 12 MG/DL (ref 6–20)
BUPRENORPHINE UR QL: NEGATIVE
CALCIUM SERPL-MCNC: 9.1 MG/DL (ref 8.6–10.2)
CANNABINOIDS UR QL SCN: NEGATIVE
CHLORIDE SERPL-SCNC: 108 MMOL/L (ref 98–107)
CK SERPL-CCNC: 118 U/L (ref 20–180)
CLARITY UR: CLEAR
CO2 SERPL-SCNC: 27 MMOL/L (ref 22–29)
COCAINE UR QL SCN: POSITIVE
COLOR UR: YELLOW
COMMENT: NORMAL
CREAT SERPL-MCNC: 0.9 MG/DL (ref 0.5–1)
EKG ATRIAL RATE: 61 BPM
EKG P AXIS: 60 DEGREES
EKG P-R INTERVAL: 152 MS
EKG Q-T INTERVAL: 422 MS
EKG QRS DURATION: 86 MS
EKG QTC CALCULATION (BAZETT): 424 MS
EKG T AXIS: 29 DEGREES
EKG VENTRICULAR RATE: 61 BPM
EOSINOPHIL # BLD: 0.28 K/UL (ref 0.05–0.5)
EOSINOPHILS RELATIVE PERCENT: 4 % (ref 0–6)
ERYTHROCYTE [DISTWIDTH] IN BLOOD BY AUTOMATED COUNT: 14.7 % (ref 11.5–15)
ETHANOLAMINE SERPL-MCNC: <10 MG/DL
FENTANYL UR QL: NEGATIVE
GFR SERPL CREATININE-BSD FRML MDRD: >60 ML/MIN/1.73M2
GLUCOSE SERPL-MCNC: 91 MG/DL (ref 74–99)
GLUCOSE UR STRIP-MCNC: NEGATIVE MG/DL
HCT VFR BLD AUTO: 38.6 % (ref 34–48)
HGB BLD-MCNC: 12.5 G/DL (ref 11.5–15.5)
HGB UR QL STRIP.AUTO: NEGATIVE
IMM GRANULOCYTES # BLD AUTO: <0.03 K/UL (ref 0–0.58)
IMM GRANULOCYTES NFR BLD: 0 % (ref 0–5)
KETONES UR STRIP-MCNC: NEGATIVE MG/DL
LEUKOCYTE ESTERASE UR QL STRIP: NEGATIVE
LYMPHOCYTES NFR BLD: 2.87 K/UL (ref 1.5–4)
LYMPHOCYTES RELATIVE PERCENT: 44 % (ref 20–42)
MCH RBC QN AUTO: 27.4 PG (ref 26–35)
MCHC RBC AUTO-ENTMCNC: 32.4 G/DL (ref 32–34.5)
MCV RBC AUTO: 84.6 FL (ref 80–99.9)
METHADONE UR QL: NEGATIVE
MONOCYTES NFR BLD: 0.39 K/UL (ref 0.1–0.95)
MONOCYTES NFR BLD: 6 % (ref 2–12)
NEUTROPHILS NFR BLD: 45 % (ref 43–80)
NEUTS SEG NFR BLD: 2.94 K/UL (ref 1.8–7.3)
NITRITE UR QL STRIP: NEGATIVE
OPIATES UR QL SCN: NEGATIVE
OXYCODONE UR QL SCN: NEGATIVE
PCP UR QL SCN: NEGATIVE
PH UR STRIP: 7 [PH] (ref 5–9)
PLATELET # BLD AUTO: 351 K/UL (ref 130–450)
PMV BLD AUTO: 11 FL (ref 7–12)
POTASSIUM SERPL-SCNC: 3.7 MMOL/L (ref 3.5–5)
PROT SERPL-MCNC: 7.1 G/DL (ref 6.4–8.3)
PROT UR STRIP-MCNC: NEGATIVE MG/DL
RBC # BLD AUTO: 4.56 M/UL (ref 3.5–5.5)
SALICYLATES SERPL-MCNC: <0.3 MG/DL (ref 0–30)
SODIUM SERPL-SCNC: 143 MMOL/L (ref 132–146)
SP GR UR STRIP: 1.02 (ref 1–1.03)
TEST INFORMATION: ABNORMAL
TOXIC TRICYCLIC SC,BLOOD: NEGATIVE
UROBILINOGEN UR STRIP-ACNC: 1 EU/DL (ref 0–1)
WBC OTHER # BLD: 6.5 K/UL (ref 4.5–11.5)

## 2024-02-14 PROCEDURE — 81003 URINALYSIS AUTO W/O SCOPE: CPT

## 2024-02-14 PROCEDURE — 1240000000 HC EMOTIONAL WELLNESS R&B

## 2024-02-14 PROCEDURE — 99285 EMERGENCY DEPT VISIT HI MDM: CPT

## 2024-02-14 PROCEDURE — 6370000000 HC RX 637 (ALT 250 FOR IP): Performed by: PSYCHIATRY & NEUROLOGY

## 2024-02-14 PROCEDURE — 93005 ELECTROCARDIOGRAM TRACING: CPT | Performed by: EMERGENCY MEDICINE

## 2024-02-14 PROCEDURE — 80307 DRUG TEST PRSMV CHEM ANLYZR: CPT

## 2024-02-14 PROCEDURE — 85025 COMPLETE CBC W/AUTO DIFF WBC: CPT

## 2024-02-14 PROCEDURE — 82550 ASSAY OF CK (CPK): CPT

## 2024-02-14 PROCEDURE — 80179 DRUG ASSAY SALICYLATE: CPT

## 2024-02-14 PROCEDURE — 80143 DRUG ASSAY ACETAMINOPHEN: CPT

## 2024-02-14 PROCEDURE — 6370000000 HC RX 637 (ALT 250 FOR IP): Performed by: EMERGENCY MEDICINE

## 2024-02-14 PROCEDURE — G0480 DRUG TEST DEF 1-7 CLASSES: HCPCS

## 2024-02-14 PROCEDURE — 80053 COMPREHEN METABOLIC PANEL: CPT

## 2024-02-14 PROCEDURE — 93010 ELECTROCARDIOGRAM REPORT: CPT | Performed by: INTERNAL MEDICINE

## 2024-02-14 RX ORDER — HALOPERIDOL 5 MG/1
5 TABLET ORAL EVERY 6 HOURS PRN
Status: DISCONTINUED | OUTPATIENT
Start: 2024-02-14 | End: 2024-02-23 | Stop reason: HOSPADM

## 2024-02-14 RX ORDER — HYDROXYZINE PAMOATE 25 MG/1
50 CAPSULE ORAL 3 TIMES DAILY PRN
Status: DISCONTINUED | OUTPATIENT
Start: 2024-02-14 | End: 2024-02-23 | Stop reason: HOSPADM

## 2024-02-14 RX ORDER — AMLODIPINE BESYLATE 5 MG/1
10 TABLET ORAL ONCE
Status: COMPLETED | OUTPATIENT
Start: 2024-02-14 | End: 2024-02-14

## 2024-02-14 RX ORDER — HALOPERIDOL 5 MG/ML
5 INJECTION INTRAMUSCULAR EVERY 6 HOURS PRN
Status: DISCONTINUED | OUTPATIENT
Start: 2024-02-14 | End: 2024-02-23 | Stop reason: HOSPADM

## 2024-02-14 RX ORDER — LANOLIN ALCOHOL/MO/W.PET/CERES
3 CREAM (GRAM) TOPICAL NIGHTLY PRN
Status: DISCONTINUED | OUTPATIENT
Start: 2024-02-14 | End: 2024-02-23 | Stop reason: HOSPADM

## 2024-02-14 RX ORDER — MAGNESIUM HYDROXIDE/ALUMINUM HYDROXICE/SIMETHICONE 120; 1200; 1200 MG/30ML; MG/30ML; MG/30ML
30 SUSPENSION ORAL PRN
Status: DISCONTINUED | OUTPATIENT
Start: 2024-02-14 | End: 2024-02-23 | Stop reason: HOSPADM

## 2024-02-14 RX ORDER — ACETAMINOPHEN 325 MG/1
650 TABLET ORAL EVERY 6 HOURS PRN
Status: DISCONTINUED | OUTPATIENT
Start: 2024-02-14 | End: 2024-02-23 | Stop reason: HOSPADM

## 2024-02-14 RX ORDER — NICOTINE 21 MG/24HR
1 PATCH, TRANSDERMAL 24 HOURS TRANSDERMAL DAILY
Status: DISCONTINUED | OUTPATIENT
Start: 2024-02-14 | End: 2024-02-23 | Stop reason: HOSPADM

## 2024-02-14 RX ADMIN — HYDROXYZINE PAMOATE 50 MG: 50 CAPSULE ORAL at 16:48

## 2024-02-14 RX ADMIN — MELATONIN 3 MG ORAL TABLET 3 MG: 3 TABLET ORAL at 21:14

## 2024-02-14 RX ADMIN — HALOPERIDOL 5 MG: 5 TABLET ORAL at 22:07

## 2024-02-14 RX ADMIN — ACETAMINOPHEN 325MG 650 MG: 325 TABLET ORAL at 21:13

## 2024-02-14 RX ADMIN — HYDROXYZINE PAMOATE 50 MG: 50 CAPSULE ORAL at 23:37

## 2024-02-14 RX ADMIN — AMLODIPINE BESYLATE 10 MG: 5 TABLET ORAL at 09:16

## 2024-02-14 ASSESSMENT — PAIN DESCRIPTION - ONSET: ONSET: GRADUAL

## 2024-02-14 ASSESSMENT — PAIN DESCRIPTION - PAIN TYPE: TYPE: ACUTE PAIN

## 2024-02-14 ASSESSMENT — PAIN DESCRIPTION - ORIENTATION
ORIENTATION: RIGHT;LOWER
ORIENTATION: LOWER;RIGHT

## 2024-02-14 ASSESSMENT — PAIN SCALES - GENERAL
PAINLEVEL_OUTOF10: 8
PAINLEVEL_OUTOF10: 10
PAINLEVEL_OUTOF10: 10

## 2024-02-14 ASSESSMENT — LIFESTYLE VARIABLES
HOW MANY STANDARD DRINKS CONTAINING ALCOHOL DO YOU HAVE ON A TYPICAL DAY: PATIENT DOES NOT DRINK
HOW OFTEN DO YOU HAVE A DRINK CONTAINING ALCOHOL: NEVER

## 2024-02-14 ASSESSMENT — PAIN DESCRIPTION - DESCRIPTORS
DESCRIPTORS: ACHING;GNAWING
DESCRIPTORS: ACHING;GNAWING

## 2024-02-14 ASSESSMENT — SLEEP AND FATIGUE QUESTIONNAIRES
AVERAGE NUMBER OF SLEEP HOURS: 3
DO YOU HAVE DIFFICULTY SLEEPING: YES
DO YOU USE A SLEEP AID: NO

## 2024-02-14 ASSESSMENT — PAIN DESCRIPTION - LOCATION
LOCATION: BACK;LEG
LOCATION: BACK;LEG

## 2024-02-14 ASSESSMENT — PAIN - FUNCTIONAL ASSESSMENT: PAIN_FUNCTIONAL_ASSESSMENT: ACTIVITIES ARE NOT PREVENTED

## 2024-02-14 ASSESSMENT — PAIN DESCRIPTION - FREQUENCY: FREQUENCY: CONTINUOUS
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
## 2024-02-14 NOTE — BH NOTE
4 Eyes Skin Assessment     NAME:  Kathe Caldera  YOB: 1965  MEDICAL RECORD NUMBER:  [id number]    The patient is being assessed for  Admission    I agree that at least one RN has performed a thorough Head to Toe Skin Assessment on the patient. ALL assessment sites listed below have been assessed.      Areas assessed by both nurses:    Head, Face, Ears, Shoulders, Back, Chest, Arms, Elbows, Hands, and Sacrum. Buttock, Coccyx, Ischium        Does the Patient have a Wound? No noted wound(s)       Terry Prevention initiated by RN: No  Wound Care Orders initiated by RN: No    Pressure Injury (Stage 3,4, Unstageable, DTI, NWPT, and Complex wounds) if present, place Wound referral order by RN under : No    New Ostomies, if present place, Ostomy referral order under : No     Nurse 1 eSignature: Electronically signed by Abiola Go RN on 2/14/24 at 6:50 PM EST    **SHARE this note so that the co-signing nurse can place an eSignature**    Nurse 2 eSignature: Electronically signed by Payton Bueno RN on 2/14/24 at 7:15 PM EST
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
## 2024-02-14 NOTE — BH NOTE
Patient states she has been off her medication for months. When asked how long she states she doesn't remember. Unable to verify medication from home.

## 2024-02-15 PROBLEM — F32.9 MDD (MAJOR DEPRESSIVE DISORDER), SINGLE EPISODE: Status: RESOLVED | Noted: 2024-02-14 | Resolved: 2024-02-15

## 2024-02-15 PROBLEM — F25.0 SCHIZOAFFECTIVE DISORDER, BIPOLAR TYPE (HCC): Status: ACTIVE | Noted: 2024-02-15

## 2024-02-15 PROBLEM — F14.10 COCAINE ABUSE (HCC): Status: ACTIVE | Noted: 2024-02-15

## 2024-02-15 PROCEDURE — 1240000000 HC EMOTIONAL WELLNESS R&B

## 2024-02-15 PROCEDURE — 90792 PSYCH DIAG EVAL W/MED SRVCS: CPT | Performed by: NURSE PRACTITIONER

## 2024-02-15 PROCEDURE — 6370000000 HC RX 637 (ALT 250 FOR IP): Performed by: NURSE PRACTITIONER

## 2024-02-15 PROCEDURE — 6370000000 HC RX 637 (ALT 250 FOR IP): Performed by: PSYCHIATRY & NEUROLOGY

## 2024-02-15 RX ORDER — ATORVASTATIN CALCIUM 40 MG/1
40 TABLET, FILM COATED ORAL NIGHTLY
Status: DISCONTINUED | OUTPATIENT
Start: 2024-02-15 | End: 2024-02-23 | Stop reason: HOSPADM

## 2024-02-15 RX ORDER — RISPERIDONE 1 MG/1
0.5 TABLET ORAL 2 TIMES DAILY
Status: DISCONTINUED | OUTPATIENT
Start: 2024-02-15 | End: 2024-02-16

## 2024-02-15 RX ORDER — DIVALPROEX SODIUM 250 MG/1
250 TABLET, DELAYED RELEASE ORAL EVERY 12 HOURS SCHEDULED
Status: DISCONTINUED | OUTPATIENT
Start: 2024-02-15 | End: 2024-02-21

## 2024-02-15 RX ADMIN — ACETAMINOPHEN 325MG 650 MG: 325 TABLET ORAL at 16:34

## 2024-02-15 RX ADMIN — DIVALPROEX SODIUM 250 MG: 250 TABLET, DELAYED RELEASE ORAL at 21:30

## 2024-02-15 RX ADMIN — ATORVASTATIN CALCIUM 40 MG: 40 TABLET, FILM COATED ORAL at 21:30

## 2024-02-15 RX ADMIN — HYDROXYZINE PAMOATE 50 MG: 50 CAPSULE ORAL at 21:31

## 2024-02-15 RX ADMIN — RISPERIDONE 0.5 MG: 1 TABLET, FILM COATED ORAL at 21:30

## 2024-02-15 RX ADMIN — MELATONIN 3 MG ORAL TABLET 3 MG: 3 TABLET ORAL at 21:30

## 2024-02-15 RX ADMIN — RISPERIDONE 0.5 MG: 1 TABLET, FILM COATED ORAL at 15:27

## 2024-02-15 ASSESSMENT — PATIENT HEALTH QUESTIONNAIRE - PHQ9
SUM OF ALL RESPONSES TO PHQ QUESTIONS 1-9: 0
1. LITTLE INTEREST OR PLEASURE IN DOING THINGS: 0
SUM OF ALL RESPONSES TO PHQ QUESTIONS 1-9: 0
2. FEELING DOWN, DEPRESSED OR HOPELESS: 0
SUM OF ALL RESPONSES TO PHQ QUESTIONS 1-9: 0
SUM OF ALL RESPONSES TO PHQ QUESTIONS 1-9: 0
SUM OF ALL RESPONSES TO PHQ9 QUESTIONS 1 & 2: 0

## 2024-02-15 ASSESSMENT — PAIN SCALES - GENERAL
PAINLEVEL_OUTOF10: 0
PAINLEVEL_OUTOF10: 10

## 2024-02-15 ASSESSMENT — SLEEP AND FATIGUE QUESTIONNAIRES
DO YOU HAVE DIFFICULTY SLEEPING: YES
AVERAGE NUMBER OF SLEEP HOURS: 3
SLEEP PATTERN: DIFFICULTY FALLING ASLEEP;DISTURBED/INTERRUPTED SLEEP
DO YOU USE A SLEEP AID: NO

## 2024-02-15 ASSESSMENT — PAIN DESCRIPTION - LOCATION: LOCATION: GENERALIZED

## 2024-02-15 NOTE — BH NOTE
Patient is resting quietly in bed with eyes closed at this time. No signs of distress or discomfort noted. No PRN medications given at this time. Safety needs met. No unit problems reported. Q 15 minute rounding continued.

## 2024-02-15 NOTE — BH NOTE
Patient requesting Ativan and Trazodone which patient reports being prescribed previously. When asked where patient fills prescriptions, patient stated \"[facility] and [facility].\" Will verify patient's medications after assessments are completed.

## 2024-02-15 NOTE — BH NOTE
[facility] was called to verify home medication. Informed no medication was filled since January 2023.

## 2024-02-15 NOTE — BH NOTE
Medications verified with [facility], spoke to pharmacist Isela, see medication reconciliation. Medications may need to be verified with [facility] once pharmacy opens for a more complete medications reconciliation. Will update oncoming shift.

## 2024-02-15 NOTE — BH NOTE
Patient endorses SI w/out plan or intent, agreeable to safety plan. Endorses AH of \"voices that are distorted like a transistor radio.\" Denies HI/VH. Reports anxiety and depression 10/10. Appears flat, anxious, and irritable. Patient is isolative to room and withdrawn. No scheduled medications at this time. PRN's administered per patient request, see eMAR. In control of behaviors at this time. Q 15 minute rounding maintained. Will continue to observe and intervene as needed.

## 2024-02-15 NOTE — H&P
Department of Psychiatry  History and Physical - Adult     Patient personally seen and examined by me mental status examination performed.  I agree the below assessment by the medical student.  Psychomotor evaluation revealed no agitation retardation any abnormal movements.  Her eye contact is fair her speech is normal rate rhythm and tone.  Her mood is \"I doing okay.\"  Affect is mood incongruent flat and blunted with some underlying irritability.  Thought process is linear without flight of ideas loose associations.  Thought contents with reported auditory hallucinations does not appear to be internally stimulated or preoccupied.  she denies suicidal homicidal ideations intent or plan. She is able to repeat words and phrases, her vocabulary is intact she has knowledge of current events and past events recent remote memory are intact her impulse control is adequate her cognitive function was to be at her baseline her insight judgment is fair she is alert oriented time place and person          CHIEF COMPLAINT:  \"I wanted to kill myself\"    Patient was seen after discussing with the treatment team and reviewing the chart    CIRCUMSTANCES OF ADMISSION: Kathe Caldera has a history of schizoaffective disorder presented to the ED after several days of suicidal ideations secondary to command hallucinations telling her to cut herself. Precipitating factors include being off her medications and recently relapsed on crack cocaine. She has pink slipped by the ED physician      HISTORY OF PRESENT ILLNESS:      Patient is a 58 y.o. unmarried female with five children who is currently homeless and on SSI with a past psychiatric history of schizoaffective psychosis previously managed on unknown medications who presented to the ED after command auditory hallucinations were telling her to cut herself. She has been off her psych medications for quite some time as the pharmacy name she provided notes no medications have been

## 2024-02-15 NOTE — BH NOTE
[facility]  Initial Interdisciplinary Treatment Plan NOTE    Review Date & Time: 2/15/2024 1000    Patient was not in treatment team    Admission Type: INVOLUNTARY       Reason for admission:MDD         Estimated Length of Stay Update:  3-5 days  Estimated Discharge Date Update: 2/18/2024    EDUCATION:   Learner Progress Toward Treatment Goals: Reviewed results and recommendations of this team, Reviewed group plan and strategies, Reviewed signs, symptoms and risk of self harm and violent behavior, and Reviewed goals and plan of care    Method: Small group    Outcome: Verbalized understanding    PATIENT GOALS: no goals listed    PLAN/TREATMENT RECOMMENDATIONS UPDATE:attend groups and be medication compliant    GOALS UPDATE:   Time frame for Short-Term Goals: 1 day    Abiola Go RN

## 2024-02-15 NOTE — CARE COORDINATION
Biopsychosocial Assessment Note    Social work met with patient to complete the biopsychosocial assessment and C-SSRS.     Chief Complaint: pt reports \"I don't know, because I wanted to hurt myself.\"     Mental Status Exam: pt alert&oriented x4. Pt superficially cooperative, guarded. Pt mood depressed, anxious, irritable, blunt affect. Pt eye contact poor, minimal rapid one word responses. Pt thoughts impaired, poverty of content. Pt memory impaired. Pt insight/judgement poor. pt reports SI with no plan, denied HI/VH, and reports AH of voices but she cannot make out what they are saying.     Clinical Summary: pt reports she \"does not know\" why she is in the hospital. Pt then stated she wanted to hurt herself. Pt denied having any plans to harm herself. Pt reports she has been feeling depressed recently due to \"life and self.\" Pt reports current stress of \"people asking me questions and loud noises.\" Per ER provider note, \" 58 y.o. female presenting to the ED for psych eval, beginning days ago.  The complaint has been persistent, severe in severity, and worsened by emotional upset, stress.  Patient homeless.  Patient states suicidal.  States would cut her self.  Voices and had telling herself.  Denies homicidal ideation.\"    Pt reports a hx of inpatient psychiatric admissions but she cannot remember when or where her last admission was because \"it has been awhile.\" Pt has a hx of inpatient admissions at this facility and was last admitted 5/4/2018. Pt denied being active with an outpatient mental health provider and stated she has not followed up with a provider/been on psychotropic medications \"in awhile.\" Per chart, pt treats with [facility]. Pt reports a hx of suicidal thoughts. Pt reports feeling suicidal for the last couple of weeks and she has had plans/intentions to harm herself within the past month. Pt reports the thoughts last all day everyday and are difficult to control. Pt reports a hx of suicide

## 2024-02-16 PROCEDURE — 6370000000 HC RX 637 (ALT 250 FOR IP): Performed by: NURSE PRACTITIONER

## 2024-02-16 PROCEDURE — 6370000000 HC RX 637 (ALT 250 FOR IP): Performed by: PSYCHIATRY & NEUROLOGY

## 2024-02-16 PROCEDURE — 99232 SBSQ HOSP IP/OBS MODERATE 35: CPT | Performed by: NURSE PRACTITIONER

## 2024-02-16 PROCEDURE — 1240000000 HC EMOTIONAL WELLNESS R&B

## 2024-02-16 RX ORDER — QUETIAPINE FUMARATE 25 MG/1
50 TABLET, FILM COATED ORAL DAILY
Status: DISCONTINUED | OUTPATIENT
Start: 2024-02-16 | End: 2024-02-19

## 2024-02-16 RX ORDER — LIDOCAINE 4 G/G
1 PATCH TOPICAL DAILY
Status: DISCONTINUED | OUTPATIENT
Start: 2024-02-16 | End: 2024-02-23 | Stop reason: HOSPADM

## 2024-02-16 RX ADMIN — QUETIAPINE FUMARATE 150 MG: 100 TABLET ORAL at 22:00

## 2024-02-16 RX ADMIN — HYDROXYZINE PAMOATE 50 MG: 50 CAPSULE ORAL at 22:01

## 2024-02-16 RX ADMIN — DIVALPROEX SODIUM 250 MG: 250 TABLET, DELAYED RELEASE ORAL at 22:00

## 2024-02-16 RX ADMIN — DIVALPROEX SODIUM 250 MG: 250 TABLET, DELAYED RELEASE ORAL at 09:09

## 2024-02-16 RX ADMIN — ATORVASTATIN CALCIUM 40 MG: 40 TABLET, FILM COATED ORAL at 22:00

## 2024-02-16 RX ADMIN — MELATONIN 3 MG ORAL TABLET 3 MG: 3 TABLET ORAL at 22:01

## 2024-02-16 RX ADMIN — RISPERIDONE 0.5 MG: 1 TABLET, FILM COATED ORAL at 09:09

## 2024-02-16 RX ADMIN — QUETIAPINE FUMARATE 50 MG: 25 TABLET ORAL at 11:59

## 2024-02-16 ASSESSMENT — PAIN SCALES - GENERAL
PAINLEVEL_OUTOF10: 7
PAINLEVEL_OUTOF10: 0
PAINLEVEL_OUTOF10: 0

## 2024-02-16 ASSESSMENT — PAIN DESCRIPTION - LOCATION: LOCATION: BACK

## 2024-02-16 ASSESSMENT — PAIN DESCRIPTION - ORIENTATION: ORIENTATION: LOWER

## 2024-02-16 NOTE — CARE COORDINATION
SW met with the pt. Pt observed laying in bed. Pt reports feeling a little worse today because she cannot sleep. Pt reports SI and AH. Pt reports the voices are \"really loud.\" Pt denied HI/VH. Pt has not thought about her discharge plan yet and she does not know what her options are. Pt reports she has been to the [facility] [place] but she does not know if she is on any restrictions at this time. Pt denied being active with [facility]. Pt plans on following up with [facility] however she does not know if she will be going to the [place] or [place] location. Pt stated she is planning on signing up for housing in [place] at time of discharge. Pt had poor eye contact and engaged minimally with SW. SW will continue to work with the pt on her discharge plan/options as she stabilizes.     SHAWN contacted the [facility] [phone] to determine if the pt is on any restrictions. No answer, a voicemail was left.

## 2024-02-17 PROCEDURE — 6370000000 HC RX 637 (ALT 250 FOR IP): Performed by: PSYCHIATRY & NEUROLOGY

## 2024-02-17 PROCEDURE — 99232 SBSQ HOSP IP/OBS MODERATE 35: CPT | Performed by: NURSE PRACTITIONER

## 2024-02-17 PROCEDURE — 6370000000 HC RX 637 (ALT 250 FOR IP): Performed by: NURSE PRACTITIONER

## 2024-02-17 PROCEDURE — 1240000000 HC EMOTIONAL WELLNESS R&B

## 2024-02-17 RX ADMIN — ATORVASTATIN CALCIUM 40 MG: 40 TABLET, FILM COATED ORAL at 22:14

## 2024-02-17 RX ADMIN — HYDROXYZINE PAMOATE 50 MG: 50 CAPSULE ORAL at 22:13

## 2024-02-17 RX ADMIN — DIVALPROEX SODIUM 250 MG: 250 TABLET, DELAYED RELEASE ORAL at 22:14

## 2024-02-17 RX ADMIN — QUETIAPINE FUMARATE 50 MG: 25 TABLET ORAL at 09:38

## 2024-02-17 RX ADMIN — DIVALPROEX SODIUM 250 MG: 250 TABLET, DELAYED RELEASE ORAL at 09:37

## 2024-02-17 RX ADMIN — ACETAMINOPHEN 325MG 650 MG: 325 TABLET ORAL at 22:14

## 2024-02-17 RX ADMIN — QUETIAPINE FUMARATE 150 MG: 100 TABLET ORAL at 22:13

## 2024-02-17 RX ADMIN — HYDROXYZINE PAMOATE 50 MG: 50 CAPSULE ORAL at 15:36

## 2024-02-17 RX ADMIN — MELATONIN 3 MG ORAL TABLET 3 MG: 3 TABLET ORAL at 22:14

## 2024-02-17 ASSESSMENT — PAIN DESCRIPTION - FREQUENCY: FREQUENCY: INTERMITTENT

## 2024-02-17 ASSESSMENT — PAIN DESCRIPTION - LOCATION
LOCATION: BACK
LOCATION: BACK

## 2024-02-17 ASSESSMENT — PAIN SCALES - GENERAL
PAINLEVEL_OUTOF10: 8
PAINLEVEL_OUTOF10: 8
PAINLEVEL_OUTOF10: 9
PAINLEVEL_OUTOF10: 9

## 2024-02-17 ASSESSMENT — PAIN DESCRIPTION - ORIENTATION
ORIENTATION: LOWER
ORIENTATION: LOWER

## 2024-02-17 ASSESSMENT — PAIN DESCRIPTION - DESCRIPTORS
DESCRIPTORS: ACHING;DISCOMFORT
DESCRIPTORS: ACHING;DISCOMFORT

## 2024-02-17 ASSESSMENT — PAIN DESCRIPTION - ONSET: ONSET: ON-GOING

## 2024-02-17 ASSESSMENT — PAIN - FUNCTIONAL ASSESSMENT
PAIN_FUNCTIONAL_ASSESSMENT: ACTIVITIES ARE NOT PREVENTED
PAIN_FUNCTIONAL_ASSESSMENT: PREVENTS OR INTERFERES SOME ACTIVE ACTIVITIES AND ADLS

## 2024-02-17 ASSESSMENT — PAIN DESCRIPTION - PAIN TYPE: TYPE: ACUTE PAIN

## 2024-02-17 NOTE — BH NOTE
[facility]  Day 3 Interdisciplinary Treatment Plan NOTE    Review Date & Time: 2/17/2024 1000    Patient was not in treatment team    Estimated Length of Stay Update:  3-5 days  Estimated Discharge Date Update: 2/20/2024    EDUCATION:   Learner Progress Toward Treatment Goals: Reviewed results and recommendations of this team, Reviewed group plan and strategies, Reviewed signs, symptoms and risk of self harm and violent behavior, and Reviewed goals and plan of care    Method: Small group    Outcome: Verbalized understanding    PATIENT GOALS: have less worries    PLAN/TREATMENT RECOMMENDATIONS UPDATE:attend groups and be medication compliant    GOALS UPDATE:   Time frame for Short-Term Goals: 1 day      Abiola Go RN

## 2024-02-17 NOTE — GROUP NOTE
Group Therapy Note    Date: 2/17/2024    Group Start Time: 1400  Group End Time: 1445  Group Topic: Psychoeducation    [facility] 7[facility] ACUTE BH 2    Krissy Arana CTRS    Group Therapy Note    Attendees: 11    Date: 2/17/2024  Start Time: 1400  End Time:  1445  Number of Participants: 11    Type of Group: Psychoeducation    Name:  Positivity     Patient's Goal:  ID how positive thinking affects mental health and physical health, ID different types of negative thinking, ID ways to increase having a positive mindset.     Notes:  CTRS lead educational group discussion on positivity. Encouraged patients to share their experiences. Patient did not add to group discussion and listened to group quietly.    Status After Intervention:  Improved    Participation Level: None    Participation Quality: Resistant      Speech:  mute      Thought Process/Content: None observed      Affective Functioning: Flat      Mood:  Flat      Level of consciousness:  Alert and Attentive      Response to Learning: Progressing to goal      Endings: None Reported    Modes of Intervention: Education, Support, Socialization, Exploration, Clarifying, and Problem-solving      Discipline Responsible: Psychoeducational Specialist      Signature:  BRODY Corrales

## 2024-02-18 PROCEDURE — 6370000000 HC RX 637 (ALT 250 FOR IP): Performed by: NURSE PRACTITIONER

## 2024-02-18 PROCEDURE — 1240000000 HC EMOTIONAL WELLNESS R&B

## 2024-02-18 PROCEDURE — 99232 SBSQ HOSP IP/OBS MODERATE 35: CPT | Performed by: NURSE PRACTITIONER

## 2024-02-18 PROCEDURE — 6370000000 HC RX 637 (ALT 250 FOR IP): Performed by: PSYCHIATRY & NEUROLOGY

## 2024-02-18 RX ADMIN — ACETAMINOPHEN 325MG 650 MG: 325 TABLET ORAL at 22:12

## 2024-02-18 RX ADMIN — MELATONIN 3 MG ORAL TABLET 3 MG: 3 TABLET ORAL at 22:14

## 2024-02-18 RX ADMIN — ATORVASTATIN CALCIUM 40 MG: 40 TABLET, FILM COATED ORAL at 22:13

## 2024-02-18 RX ADMIN — HYDROXYZINE PAMOATE 50 MG: 50 CAPSULE ORAL at 22:13

## 2024-02-18 RX ADMIN — HYDROXYZINE PAMOATE 50 MG: 50 CAPSULE ORAL at 09:30

## 2024-02-18 RX ADMIN — ACETAMINOPHEN 325MG 650 MG: 325 TABLET ORAL at 14:24

## 2024-02-18 RX ADMIN — QUETIAPINE FUMARATE 150 MG: 100 TABLET ORAL at 22:13

## 2024-02-18 RX ADMIN — DIVALPROEX SODIUM 250 MG: 250 TABLET, DELAYED RELEASE ORAL at 22:13

## 2024-02-18 RX ADMIN — QUETIAPINE FUMARATE 50 MG: 25 TABLET ORAL at 09:23

## 2024-02-18 RX ADMIN — DIVALPROEX SODIUM 250 MG: 250 TABLET, DELAYED RELEASE ORAL at 09:23

## 2024-02-18 ASSESSMENT — PAIN - FUNCTIONAL ASSESSMENT
PAIN_FUNCTIONAL_ASSESSMENT: ACTIVITIES ARE NOT PREVENTED
PAIN_FUNCTIONAL_ASSESSMENT: ACTIVITIES ARE NOT PREVENTED

## 2024-02-18 ASSESSMENT — PAIN DESCRIPTION - ORIENTATION
ORIENTATION: INNER
ORIENTATION: LOWER
ORIENTATION: LOWER

## 2024-02-18 ASSESSMENT — PAIN SCALES - GENERAL
PAINLEVEL_OUTOF10: 7
PAINLEVEL_OUTOF10: 5
PAINLEVEL_OUTOF10: 8
PAINLEVEL_OUTOF10: 6

## 2024-02-18 ASSESSMENT — PAIN DESCRIPTION - LOCATION
LOCATION: BACK
LOCATION: BACK
LOCATION: LEG
LOCATION: BACK

## 2024-02-18 ASSESSMENT — PAIN DESCRIPTION - DESCRIPTORS
DESCRIPTORS: ACHING
DESCRIPTORS: ACHING;STABBING
DESCRIPTORS: ACHING

## 2024-02-18 NOTE — BH NOTE
Patient wants SW to contact her sister Dayna Nagel for any collateral needed.[phone]. Will give this to next shift.

## 2024-02-18 NOTE — BH NOTE
Patient denies SI/HI/VH. Endorses AH of voices that are distorted. Reports 8/10 anxiety and depression. Appears flat, sad, and anxious. Bright and social with select peers. Patient is out on the unit and medication compliant. PRN Tylenol, Vistaril, and melatonin administered per patient request. C/O itching to lower back, requesting cortisone cream. Remains in control of behaviors. Q 15 minute rounds maintained. Will continue to observe and intervene as needed.

## 2024-02-18 NOTE — GROUP NOTE
Group Therapy Note    Date: 2/18/2024    Group Start Time: 1415  Group End Time: 1445  Group Topic: Cognitive Skills    [facility] ACUTE BH 1    Jaqueline Carlisle MSW, BRYNN        Group Therapy Note    Attendees: 11       Patient's Goal:  Pt will be able to verbalize understanding regarding the importance of self-care.    Notes:  Pt made connections and participated in group.    Status After Intervention:  Improved    Participation Level: Active Listener and Interactive    Participation Quality: Appropriate, Attentive, Sharing, and Supportive      Speech:  normal      Thought Process/Content: Logical  Linear      Affective Functioning: Congruent      Mood: euthymic      Level of consciousness:  Alert, Oriented x4, and Attentive      Response to Learning: Able to verbalize current knowledge/experience, Able to verbalize/acknowledge new learning, Able to retain information, and Capable of insight      Endings: None Reported    Modes of Intervention: Education, Support, Socialization, Exploration, and Clarifying      Discipline Responsible: /Counselor      Signature:  KRISTIN Cazares, BRYNN

## 2024-02-19 PROCEDURE — 6370000000 HC RX 637 (ALT 250 FOR IP): Performed by: PSYCHIATRY & NEUROLOGY

## 2024-02-19 PROCEDURE — 6370000000 HC RX 637 (ALT 250 FOR IP): Performed by: NURSE PRACTITIONER

## 2024-02-19 PROCEDURE — 99232 SBSQ HOSP IP/OBS MODERATE 35: CPT | Performed by: NURSE PRACTITIONER

## 2024-02-19 PROCEDURE — 1240000000 HC EMOTIONAL WELLNESS R&B

## 2024-02-19 RX ORDER — QUETIAPINE FUMARATE 100 MG/1
100 TABLET, FILM COATED ORAL DAILY
Status: DISCONTINUED | OUTPATIENT
Start: 2024-02-20 | End: 2024-02-21

## 2024-02-19 RX ORDER — FLUCONAZOLE 150 MG/1
150 TABLET ORAL ONCE
Status: COMPLETED | OUTPATIENT
Start: 2024-02-19 | End: 2024-02-19

## 2024-02-19 RX ORDER — QUETIAPINE FUMARATE 25 MG/1
50 TABLET, FILM COATED ORAL DAILY
Status: COMPLETED | OUTPATIENT
Start: 2024-02-19 | End: 2024-02-19

## 2024-02-19 RX ADMIN — DIVALPROEX SODIUM 250 MG: 250 TABLET, DELAYED RELEASE ORAL at 20:57

## 2024-02-19 RX ADMIN — MELATONIN 3 MG ORAL TABLET 3 MG: 3 TABLET ORAL at 20:56

## 2024-02-19 RX ADMIN — QUETIAPINE FUMARATE 50 MG: 25 TABLET ORAL at 08:56

## 2024-02-19 RX ADMIN — DIVALPROEX SODIUM 250 MG: 250 TABLET, DELAYED RELEASE ORAL at 08:56

## 2024-02-19 RX ADMIN — HYDROXYZINE PAMOATE 50 MG: 50 CAPSULE ORAL at 08:57

## 2024-02-19 RX ADMIN — QUETIAPINE FUMARATE 50 MG: 25 TABLET ORAL at 09:57

## 2024-02-19 RX ADMIN — QUETIAPINE FUMARATE 300 MG: 200 TABLET ORAL at 20:57

## 2024-02-19 RX ADMIN — ATORVASTATIN CALCIUM 40 MG: 40 TABLET, FILM COATED ORAL at 20:57

## 2024-02-19 RX ADMIN — FLUCONAZOLE 150 MG: 150 TABLET ORAL at 19:14

## 2024-02-19 RX ADMIN — HYDROXYZINE PAMOATE 50 MG: 50 CAPSULE ORAL at 20:57

## 2024-02-19 RX ADMIN — ACETAMINOPHEN 325MG 650 MG: 325 TABLET ORAL at 20:56

## 2024-02-19 ASSESSMENT — PAIN DESCRIPTION - DESCRIPTORS
DESCRIPTORS: SORE
DESCRIPTORS: ACHING
DESCRIPTORS: ACHING;SORE

## 2024-02-19 ASSESSMENT — PAIN DESCRIPTION - FREQUENCY: FREQUENCY: INTERMITTENT

## 2024-02-19 ASSESSMENT — PAIN DESCRIPTION - PAIN TYPE: TYPE: ACUTE PAIN

## 2024-02-19 ASSESSMENT — PAIN DESCRIPTION - LOCATION
LOCATION: LEG
LOCATION: HIP
LOCATION: HIP

## 2024-02-19 ASSESSMENT — PAIN DESCRIPTION - ORIENTATION
ORIENTATION: LEFT
ORIENTATION: RIGHT
ORIENTATION: RIGHT

## 2024-02-19 ASSESSMENT — PAIN SCALES - GENERAL
PAINLEVEL_OUTOF10: 9
PAINLEVEL_OUTOF10: 8
PAINLEVEL_OUTOF10: 3
PAINLEVEL_OUTOF10: 6
PAINLEVEL_OUTOF10: 9

## 2024-02-19 ASSESSMENT — PAIN DESCRIPTION - ONSET: ONSET: ON-GOING

## 2024-02-19 ASSESSMENT — PAIN - FUNCTIONAL ASSESSMENT: PAIN_FUNCTIONAL_ASSESSMENT: ACTIVITIES ARE NOT PREVENTED

## 2024-02-19 NOTE — GROUP NOTE
Date: 2/19/2024  Start Time: 1800  End Time:  1825  Number of Participants: 10    Type of Group: wrap up    Wellness Binder Information  Module Name:  Wrap Up    Patient's Goal:  To reflect on accomplishments of the day and focus on the positive of the day.  Patient will be able to ID if they met their goal for the day, and if not what barriers/improvements can be made to accomplish it.     Notes:  Patient reflected on accomplishments of the day and if they met their goal for the day.  Patient was able to ID the positives of the day and any barriers/improvements that can be made tomorrow.  Patient was an active participant, and responsive during wrap up session.     Status After Intervention:  Improved    Participation Level: Active Listener    Participation Quality: Attentive and Supportive      Speech:  normal      Thought Process/Content: Logical      Affective Functioning: Congruent      Mood: anxious      Level of consciousness:  Alert and Attentive      Response to Learning: Able to verbalize current knowledge/experience, Able to verbalize/acknowledge new learning, and Progressing to goal      Endings: None Reported    Modes of Intervention: Education, Exploration, and Problem-solving      Discipline Responsible: Recreational Therapist      Signature:  BRODY Cortez

## 2024-02-19 NOTE — CARE COORDINATION
SW attempted to contact pt's sister Dayna [phone] (MONICA SIGNED) for collateral. No answer, SHAWN left VM to return call.

## 2024-02-20 PROCEDURE — 6370000000 HC RX 637 (ALT 250 FOR IP): Performed by: NURSE PRACTITIONER

## 2024-02-20 PROCEDURE — 6370000000 HC RX 637 (ALT 250 FOR IP): Performed by: PSYCHIATRY & NEUROLOGY

## 2024-02-20 PROCEDURE — 99232 SBSQ HOSP IP/OBS MODERATE 35: CPT

## 2024-02-20 PROCEDURE — 1240000000 HC EMOTIONAL WELLNESS R&B

## 2024-02-20 RX ADMIN — ACETAMINOPHEN 325MG 650 MG: 325 TABLET ORAL at 21:14

## 2024-02-20 RX ADMIN — QUETIAPINE FUMARATE 300 MG: 200 TABLET ORAL at 21:11

## 2024-02-20 RX ADMIN — MELATONIN 3 MG ORAL TABLET 3 MG: 3 TABLET ORAL at 21:11

## 2024-02-20 RX ADMIN — ACETAMINOPHEN 325MG 650 MG: 325 TABLET ORAL at 06:50

## 2024-02-20 RX ADMIN — ATORVASTATIN CALCIUM 40 MG: 40 TABLET, FILM COATED ORAL at 21:11

## 2024-02-20 RX ADMIN — DIVALPROEX SODIUM 250 MG: 250 TABLET, DELAYED RELEASE ORAL at 09:03

## 2024-02-20 RX ADMIN — DIVALPROEX SODIUM 250 MG: 250 TABLET, DELAYED RELEASE ORAL at 21:11

## 2024-02-20 RX ADMIN — HYDROXYZINE PAMOATE 50 MG: 50 CAPSULE ORAL at 21:11

## 2024-02-20 RX ADMIN — QUETIAPINE FUMARATE 100 MG: 100 TABLET ORAL at 09:03

## 2024-02-20 ASSESSMENT — PAIN DESCRIPTION - DESCRIPTORS
DESCRIPTORS: SORE
DESCRIPTORS: ACHING

## 2024-02-20 ASSESSMENT — PAIN DESCRIPTION - LOCATION
LOCATION: HIP

## 2024-02-20 ASSESSMENT — PAIN SCALES - GENERAL
PAINLEVEL_OUTOF10: 10
PAINLEVEL_OUTOF10: 10
PAINLEVEL_OUTOF10: 8

## 2024-02-20 ASSESSMENT — PAIN DESCRIPTION - ORIENTATION
ORIENTATION: RIGHT
ORIENTATION: RIGHT

## 2024-02-20 NOTE — GROUP NOTE
Group Therapy Note    Date: 2/20/2024    Group Start Time: 1630  Group End Time: 1800  Group Topic: Recreational    [facility] ACUTE BH 1    Anaya Almendarez CTRS    Date: 2/20/2024  Type of Group: Recreational    Wellness Binder Information  Module Name:  patients choice    Patient's Goal:  patient will be able to connect with others thru experience of movie. Patient will be able to identify with movie characteristics as empathy and how helping and supporting others can be a positive experience for oneself.    Notes:  pleasant and observed watching with peers.     Status After Intervention:  Improved    Participation Level: Active Listener and Interactive    Participation Quality: Appropriate and Attentive      Speech:  normal      Thought Process/Content: Logical      Affective Functioning: Congruent      Mood: euthymic      Level of consciousness:  Alert, Oriented x4, and Attentive      Response to Learning: Able to verbalize current knowledge/experience      Endings: None Reported    Modes of Intervention: Support, Socialization, Exploration, and Media      Discipline Responsible: Psychoeducational Specialist      Signature:  BRODY Leone

## 2024-02-20 NOTE — CARE COORDINATION
Pt approached SW later on and states that if she is able to go straight home with sister, would like this option. She states that she just spoke with her sister and that she is available now, will have her sister contact SW.    SHAWN spoke with pt sister Dayna Nagel [phone]  (MONICA signed) to gain collateral. She states that her brother was juts in her home on hospice and just passed away. She states that at this time, pt is not able to stay with her. She states that pt has trouble walking and wants to know if she is able to go to skilled nursing facility at discharge. She states that if pt is unable to go here, then she thinks drug rehab would be good for pt. SW informed her that pt is not currently agreeable to inpatient substance abuse rehab. She states that she plans to discuss pt discharge plan with pt and contact SW shortly.

## 2024-02-20 NOTE — CARE COORDINATION
SW met with pt to discuss discharge plan. Pt found resting in bed, sits up and participates in discussion with SW. Pt affect is flat and blunted. SW asked pt where she plans to go when she leaves the hospital. She states that she is unsure where she will go at discharge as she is homeless. SW discussed possible referral to substance abuse rehab. Pt states that she does not want to go to inpatient rehab for her substance abuse. She states that she plans to talk with her sister as her sister may allow her to stay with her at discharge. Pt also mentioned that she has been to the crisis unit in the past and she is interested in going her for further treatment at discharge as she does not feel she will be ready to return home. She reports that she wants to ensure her medications are right prior to her return home. Pt denied SI/HI, admits to auditory hallucinations of \"static\", denied any voices. She reports that she wants to treat with [facility] at discharge as they offer case management services.

## 2024-02-20 NOTE — BH NOTE
Patient denies HI. Endorses SI without plan, agreeable to safety plan. Endorses AH of voices telling her to die and becoming louder. Also, endorses VH of shadows. Reports 8/10 anxiety and depression. Appears flat, sad, worried, and frightened. Reports racing thoughts and increased paranoia R/T increased AVH. Patient is out on the unit, withdrawn to self. Medications taken without issues. Will continue to observe and intervene as needed. Q 15 minute rounding maintained.

## 2024-02-20 NOTE — CARE COORDINATION
Gayle received a phone call from pt's sister, Dayna Nagel [phone].  Dayna stated that she is unsure why her sister was admitted to the hospital.  She said that she needs rehab for physical therapy for her mobility issues when walking.  She stated that she also needs drug rehab for her 40 years of drug abuse.  Dayna stated that the pt is VERY depressed.  Dayna denies mental health and / or addiction within their family.  She stated that the pt is currently homeless and unaware if she has access to any guns or weapons.

## 2024-02-21 PROBLEM — E44.0 MODERATE PROTEIN-CALORIE MALNUTRITION (HCC): Status: ACTIVE | Noted: 2024-02-21

## 2024-02-21 PROCEDURE — 6370000000 HC RX 637 (ALT 250 FOR IP): Performed by: PSYCHIATRY & NEUROLOGY

## 2024-02-21 PROCEDURE — 1240000000 HC EMOTIONAL WELLNESS R&B

## 2024-02-21 PROCEDURE — 6370000000 HC RX 637 (ALT 250 FOR IP): Performed by: NURSE PRACTITIONER

## 2024-02-21 RX ORDER — DIVALPROEX SODIUM 500 MG/1
500 TABLET, DELAYED RELEASE ORAL EVERY 12 HOURS SCHEDULED
Status: DISCONTINUED | OUTPATIENT
Start: 2024-02-21 | End: 2024-02-23 | Stop reason: HOSPADM

## 2024-02-21 RX ORDER — QUETIAPINE FUMARATE 25 MG/1
50 TABLET, FILM COATED ORAL ONCE
Status: COMPLETED | OUTPATIENT
Start: 2024-02-21 | End: 2024-02-21

## 2024-02-21 RX ORDER — QUETIAPINE FUMARATE 200 MG/1
400 TABLET, FILM COATED ORAL NIGHTLY
Status: DISCONTINUED | OUTPATIENT
Start: 2024-02-21 | End: 2024-02-23 | Stop reason: HOSPADM

## 2024-02-21 RX ADMIN — ACETAMINOPHEN 325MG 650 MG: 325 TABLET ORAL at 20:55

## 2024-02-21 RX ADMIN — QUETIAPINE FUMARATE 50 MG: 25 TABLET ORAL at 13:09

## 2024-02-21 RX ADMIN — MELATONIN 3 MG ORAL TABLET 3 MG: 3 TABLET ORAL at 20:55

## 2024-02-21 RX ADMIN — QUETIAPINE FUMARATE 100 MG: 100 TABLET ORAL at 09:13

## 2024-02-21 RX ADMIN — ATORVASTATIN CALCIUM 40 MG: 40 TABLET, FILM COATED ORAL at 20:55

## 2024-02-21 RX ADMIN — DIVALPROEX SODIUM 500 MG: 500 TABLET, DELAYED RELEASE ORAL at 20:55

## 2024-02-21 RX ADMIN — QUETIAPINE FUMARATE 400 MG: 200 TABLET ORAL at 20:55

## 2024-02-21 RX ADMIN — DIVALPROEX SODIUM 250 MG: 250 TABLET, DELAYED RELEASE ORAL at 09:13

## 2024-02-21 RX ADMIN — HYDROXYZINE PAMOATE 50 MG: 50 CAPSULE ORAL at 20:56

## 2024-02-21 ASSESSMENT — PAIN DESCRIPTION - LOCATION
LOCATION: HIP
LOCATION: HIP

## 2024-02-21 ASSESSMENT — PAIN - FUNCTIONAL ASSESSMENT
PAIN_FUNCTIONAL_ASSESSMENT: PREVENTS OR INTERFERES SOME ACTIVE ACTIVITIES AND ADLS
PAIN_FUNCTIONAL_ASSESSMENT: PREVENTS OR INTERFERES SOME ACTIVE ACTIVITIES AND ADLS

## 2024-02-21 ASSESSMENT — PAIN DESCRIPTION - DESCRIPTORS
DESCRIPTORS: THROBBING
DESCRIPTORS: THROBBING

## 2024-02-21 ASSESSMENT — PAIN SCALES - GENERAL
PAINLEVEL_OUTOF10: 0
PAINLEVEL_OUTOF10: 7
PAINLEVEL_OUTOF10: 9
PAINLEVEL_OUTOF10: 7

## 2024-02-21 ASSESSMENT — PAIN DESCRIPTION - FREQUENCY: FREQUENCY: CONTINUOUS

## 2024-02-21 ASSESSMENT — PAIN DESCRIPTION - ORIENTATION
ORIENTATION: RIGHT
ORIENTATION: RIGHT

## 2024-02-21 ASSESSMENT — PAIN DESCRIPTION - PAIN TYPE: TYPE: CHRONIC PAIN

## 2024-02-21 ASSESSMENT — PAIN DESCRIPTION - ONSET: ONSET: GRADUAL

## 2024-02-21 NOTE — BH NOTE
Behavioral [facility]  Week Interdisciplinary Treatment Plan Note     Review Date & Time: 2/21/2024 1000    Patient was not in treatment team.    Estimated Length of Stay Update:  3-5 days   Estimated Discharge Date Update: 2/24/2024    EDUCATION:   Learner Progress Toward Treatment Goals: Reviewed results and recommendations of this team, Reviewed group plan and strategies, Reviewed signs, symptoms and risk of self harm and violent behavior, and Reviewed goals and plan of care    Method: Small group    Outcome: Verbalized understanding    PATIENT GOALS: stay positive    PLAN/TREATMENT RECOMMENDATIONS UPDATE: attend groups and be medication compliant    GOALS UPDATE:  Time frame for Short-Term Goals: 1 day      Abiola Go RN

## 2024-02-21 NOTE — CARE COORDINATION
Pt case was discussed during treatment team and per treatment team, skilled nursing facility is not appropriate discharge plan for pt at this time. SW will continue to discuss other options with pt.

## 2024-02-21 NOTE — CARE COORDINATION
Peer Recovery Support Note    Name: Kathe Caldera  Date: 2/21/2024    Chief Complaint   Patient presents with    Suicidal     Si with a plan to cut herself.        Peer Support met with patient.  [] Support and education provided  [x] Resources provided   [] Treatment referral:   [x] Other: Left Peer contact information  [] Patient declined peer recovery services     Referred By:     Notes: Spoke with patient who requested to meet with me after Peer Recovery group on the unit. Patient admits that she has been using drugs and wishes to stop, however, is not agreeable to inpatient treatment. Patient also states that she is homeless. Pt claims she will consider outpatient treatment, however, is not ready to plan anything d/t the fact that she is unsure where she will be residing when she leaves. Peer encouraged some form of treatment, and patient continues to decline, stating that she needs to improve physically and begin walking better before going anywhere; possibly going to PT/OT rehab. Peer provided support, multiple resources, and contact information for patient to utilize if/when ready.    Signed: Marge Olivo, 2/21/2024      [phone]

## 2024-02-21 NOTE — BH NOTE
Patient denies suicidal ideation, homicidal ideations and AVH. Denies anxiety and depression at this time. Poverty of content when conversing.  Presents calm and cooperative during assessment.  Patient is out on the unit and is social with peers.  Medications taken without issue.  No complaints or concerns verbalized at this time.  No unit problems reported.  Will continue to observe and support.

## 2024-02-21 NOTE — GROUP NOTE
Date: 2/21/2024  Start Time: 1800  End Time:  1820  Number of Participants: 8    Type of Group: wrap up    Wellness Binder Information  Module Name:  Wrap Up    Patient's Goal:  To reflect on accomplishments of the day and focus on the positive of the day.  Patient will be able to ID if they met their goal for the day, and if not what barriers/improvements can be made to accomplish it.     Notes:  Patient reflected on accomplishments of the day and if they met their goal for the day.  Patient was able to ID the positives of the day and any barriers/improvements that can be made tomorrow.  Patient was an active participant, and responsive during wrap up session. Patient reports she \"somewhat\" met her goal as the voices were lessened today.  Patient did report that she had a panic attack earlier and was feeling uncomfortable.  Patient was encouraged to engage in a distraction technique.      Status After Intervention:  Improved    Participation Level: Active Listener and Interactive    Participation Quality: Appropriate and Attentive      Speech:  normal      Thought Process/Content: Logical      Affective Functioning: Congruent    Mood: anxious      Level of consciousness:  Alert and Attentive      Response to Learning: Able to verbalize current knowledge/experience, Able to verbalize/acknowledge new learning, and Progressing to goal      Endings: None Reported    Modes of Intervention: Education, Exploration, and Problem-solving      Discipline Responsible: Recreational Therapist      Signature:  BRODY Cortez

## 2024-02-21 NOTE — GROUP NOTE
Group Therapy Note    Date: 2/21/2024  Start Time: 0825  End Time:  0840  Number of Participants: 10    Type of Group: Community Meeting    Wellness Binder Information  Module Name:  Community Meeting      Patient's Goal:  Patient will be oriented to the unit including but not limited expectations, staff, programming schedule.  Patient will be able to ID expectations of treatment.     Notes: Patient was a participant in community meeting, and was updated on expectations of the unit, staffing, programming schedule, and acclimated to their environment.    Patient shared goal for today as \"not hear voices\"    Status After Intervention:  Improved    Participation Level: Active Listener    Participation Quality: Appropriate and Attentive      Speech:  normal      Thought Process/Content: Logical      Affective Functioning: Congruent      Mood: anxious      Level of consciousness:  Alert and Attentive      Response to Learning: Able to verbalize current knowledge/experience, Able to verbalize/acknowledge new learning, and Progressing to goal      Endings: None Reported    Modes of Intervention: Education, Exploration, and Problem-solving      Discipline Responsible: Recreational Therapist      Signature:  BRODY Cortez

## 2024-02-21 NOTE — GROUP NOTE
Group Therapy Note    Date: 2/21/2024  Start Time: 1330  End Time:  1400  Number of Participants: 10    Type of Group: Recovery    Wellness Binder Information  Module Name: Peer Recovery    Patient's Goal:  Patient will be able to demonstrate knowledge of community resources available for mental health and/or drug and alcohol.     Notes:  Peer  spoke to patient regarding community resources available and provided patient with a story of hope and recovery.  Patient listened attentively in group, and was receptive to the information provided.     Status After Intervention:  Improved    Participation Level: Active Listener    Participation Quality: Appropriate and Attentive      Speech:  normal      Thought Process/Content: Logical      Affective Functioning: Congruent      Mood: anxious      Level of consciousness:  Alert and Attentive      Response to Learning: Able to verbalize current knowledge/experience, Able to verbalize/acknowledge new learning, and Progressing to goal      Endings: None Reported    Modes of Intervention: Education, Support, Exploration, and Clarifying      Discipline Responsible: Recreational Therapist      Signature:  BRODY Cortez

## 2024-02-22 LAB
DATE LAST DOSE: ABNORMAL
TME LAST DOSE: ABNORMAL H
VALPROATE SERPL-MCNC: 37 UG/ML (ref 50–100)
VANCOMYCIN DOSE: ABNORMAL MG

## 2024-02-22 PROCEDURE — 1240000000 HC EMOTIONAL WELLNESS R&B

## 2024-02-22 PROCEDURE — 6370000000 HC RX 637 (ALT 250 FOR IP): Performed by: NURSE PRACTITIONER

## 2024-02-22 PROCEDURE — 80164 ASSAY DIPROPYLACETIC ACD TOT: CPT

## 2024-02-22 PROCEDURE — 6370000000 HC RX 637 (ALT 250 FOR IP): Performed by: PSYCHIATRY & NEUROLOGY

## 2024-02-22 PROCEDURE — 36415 COLL VENOUS BLD VENIPUNCTURE: CPT

## 2024-02-22 RX ORDER — QUETIAPINE FUMARATE 200 MG/1
200 TABLET, FILM COATED ORAL DAILY
Status: DISCONTINUED | OUTPATIENT
Start: 2024-02-23 | End: 2024-02-23 | Stop reason: HOSPADM

## 2024-02-22 RX ORDER — QUETIAPINE FUMARATE 25 MG/1
50 TABLET, FILM COATED ORAL ONCE
Status: COMPLETED | OUTPATIENT
Start: 2024-02-22 | End: 2024-02-22

## 2024-02-22 RX ADMIN — HYDROXYZINE PAMOATE 50 MG: 50 CAPSULE ORAL at 21:31

## 2024-02-22 RX ADMIN — DIVALPROEX SODIUM 500 MG: 500 TABLET, DELAYED RELEASE ORAL at 21:31

## 2024-02-22 RX ADMIN — DIVALPROEX SODIUM 500 MG: 500 TABLET, DELAYED RELEASE ORAL at 08:57

## 2024-02-22 RX ADMIN — ATORVASTATIN CALCIUM 40 MG: 40 TABLET, FILM COATED ORAL at 21:31

## 2024-02-22 RX ADMIN — QUETIAPINE FUMARATE 50 MG: 25 TABLET ORAL at 11:45

## 2024-02-22 RX ADMIN — QUETIAPINE FUMARATE 150 MG: 100 TABLET ORAL at 08:57

## 2024-02-22 RX ADMIN — QUETIAPINE FUMARATE 400 MG: 200 TABLET ORAL at 21:31

## 2024-02-22 RX ADMIN — MELATONIN 3 MG ORAL TABLET 3 MG: 3 TABLET ORAL at 21:31

## 2024-02-22 NOTE — GROUP NOTE
Group Therapy Note    Date: 2/22/2024    Group Start Time: 1815  Group End Time: 1830  Group Topic: Wrap-Up    [facility] ACUTE BH 2    Krissy Arana CTRS    Group Therapy Note    Attendees: 8    Date: 2/22/2024  Start Time: 1815  End Time:  1830  Number of Participants: 8    Type of Group: Wrap-Up    Patients shared progress of goals for the day, highs/lows of the day, reflected on what was learned in groups and how day can be improved for tomorrow. Addressed any milieu issues and concerns. Patient reported she met her goal today. Patient reported her day today was good.    Status After Intervention:  Improved    Participation Level: Active Listener and Interactive    Participation Quality: Appropriate, Attentive, Sharing, and Supportive      Speech:  normal      Thought Process/Content: Logical  Linear      Affective Functioning: Congruent      Mood:  Appropriate      Level of consciousness:  Alert and Attentive      Response to Learning: Able to verbalize current knowledge/experience, Able to verbalize/acknowledge new learning, Able to retain information, Capable of insight, Able to change behavior, and Progressing to goal      Endings: None Reported    Modes of Intervention: Education, Support, Socialization, Exploration, Clarifying, and Problem-solving      Discipline Responsible: Psychoeducational Specialist      Signature:  BRODY Corrales

## 2024-02-22 NOTE — DISCHARGE INSTRUCTIONS
[facility] [address]  Phone: [phone]     [facility] location  [address]  [facility] [address]  Phone: [phone]     [facility] location  [address]  [facility] [address]  Phone: [phone]     [facility]  [address]  [facility] [address]  Phone: [phone]     [facility]  [address]  [facility] [address]  Phone: [phone]     [facility] location  [address]  [facility] [address]  Phone: [phone]     [facility] location  [address]  [facility] [address]  Phone: [phone]      [facility]  Location: [address] [place] [address]  Phone: [phone]       [facility]:  [address] [place] [address]  ([phone])      [facility]- walk in Monday-Friday from 9am-3pm  [address] [place] [address]  Phone: [phone]      [facility]  [address] [place] [address]  Phone number: [phone]  Fax number: [phone]  Please take your prescription bottles or discharge instructions to your appointment for medication management      Walk ins available Monday through Friday 11am to 3pm. Friday latest appt is 1PM      [facility] Crisis Hotline available 24 hours a day  [phone]) 826-1274 or 211  1-127.743.9368  WEB Site: www.helphotline.org      A National Crisis Number is 2-604-WLFENKI (1-643.702.3191).  You can use this number at any time to access emergency mental health services.      [address] [address]  Phone: [phone]  Fax: [phone]     [facility]   Address: [address] [place] [address]  Phone: [phone]     [facility] [place]:   Location:[facility] [address] [facility], [place] [address]  Phone number: [phone]

## 2024-02-22 NOTE — GROUP NOTE
Date: 2/22/2024  Start Time: 1105  End Time:  1130  Number of Participants: 11    Type of Group: Recreational    Wellness Binder Information  Module Name:  Family Valerie    Patient's Goal:  To increase socialization amongst peers.  To improve/maintain cognitive function through the use of trivia.      Notes:  CTRS facilitated family valerie and patient was an active participant in activity.     Status After Intervention:  Improved    Participation Level: Active Listener and Interactive    Participation Quality: Appropriate and Attentive      Speech:  normal      Thought Process/Content: Logical      Affective Functioning: Congruent      Mood: euthymic      Level of consciousness:  Alert and Attentive      Response to Learning: Able to verbalize current knowledge/experience, Able to verbalize/acknowledge new learning, and Progressing to goal      Endings: None Reported    Modes of Intervention: Education, Socialization, and Activity      Discipline Responsible: Recreational Therapist      Signature:  BRODY Cortez

## 2024-02-22 NOTE — BH NOTE
Patient presents anxious but cooperative during assessment. Denies SI/HI/VH. Endorses AH of whispers, unable to discern or identify but reports it could be her  mother. Endorses increased anxiety and paranoia d/t AH and increased stimulation on unit. Reports depression d/t poor sleep. Patient is visible on the unit, social with roommate, and is medication compliant. Observed watching movie in day area. C/O chronic R hip pain described as throbbing and rated 9/10. PRN Tylenol 650 mg PO administered per patient request. Remains in control of behaviors. Q 15 minute rounds maintained. Will continue to observe and intervene as needed.

## 2024-02-22 NOTE — GROUP NOTE
Group Therapy Note    Date: 2/22/2024  Start Time: 0755  End Time:  0815  Number of Participants: 11    Type of Group: Community Meeting    Wellness Binder Information  Module Name:  Community Meeting      Patient's Goal:  Patient will be oriented to the unit including but not limited expectations, staff, programming schedule.  Patient will be able to ID expectations of treatment.     Notes: Patient was a participant in community meeting, and was updated on expectations of the unit, staffing, programming schedule, and acclimated to their environment.    Patient shared goal for today as \"not hear voices\"    Status After Intervention:  Improved    Participation Level: Active Listener and Interactive    Participation Quality: Appropriate and Attentive      Speech:  normal      Thought Process/Content: Logical      Affective Functioning: Congruent      Mood: anxious      Level of consciousness:  Alert and Attentive      Response to Learning: Able to verbalize current knowledge/experience, Able to verbalize/acknowledge new learning, and Progressing to goal      Endings: None Reported    Modes of Intervention: Education, Exploration, and Problem-solving      Discipline Responsible: Recreational Therapist      Signature:  BRODY Cortez

## 2024-02-22 NOTE — CARE COORDINATION
SHAWN met with pt. Pt found sitting in common area. She states that she is feeling better and denied SI/HI/AVH. She states that she is [facility] to [facility], states that she feels they will be able to assist her with further treatment after discharge. She states that she is hopeful for the future and finding her own independent housing, but is understanding that this process takes time. She states that she does have her own income and will pay for a place to stay after discharge from [facility]. She states that her sister does not want her to go to a homeless shelter and plans to assist her with future living arrangements. She states that she wants to be connected with [facility] for outpatient services and is hopeful that she will be connected with a  through [facility]. She denied any further questions/concerns for SW at this time.    SHAWN contacted [facility] [phone] to see if pt is on any restrictions. SHAWN spoke with [facility] who states that pt has never been to their facility before and will just need to complete phone intake prior to coming to their facility.     SHAWN included homeless resources for pt in her discharge paperwork.

## 2024-02-23 VITALS
RESPIRATION RATE: 17 BRPM | HEART RATE: 91 BPM | HEIGHT: 69 IN | DIASTOLIC BLOOD PRESSURE: 74 MMHG | WEIGHT: 150 LBS | TEMPERATURE: 97.3 F | OXYGEN SATURATION: 99 % | SYSTOLIC BLOOD PRESSURE: 135 MMHG | BODY MASS INDEX: 22.22 KG/M2

## 2024-02-23 PROCEDURE — 6370000000 HC RX 637 (ALT 250 FOR IP): Performed by: NURSE PRACTITIONER

## 2024-02-23 PROCEDURE — 6370000000 HC RX 637 (ALT 250 FOR IP): Performed by: PSYCHIATRY & NEUROLOGY

## 2024-02-23 RX ORDER — DIVALPROEX SODIUM 500 MG/1
500 TABLET, DELAYED RELEASE ORAL EVERY 12 HOURS SCHEDULED
Qty: 60 TABLET | Refills: 0 | Status: SHIPPED | OUTPATIENT
Start: 2024-02-23 | End: 2024-03-24

## 2024-02-23 RX ORDER — QUETIAPINE FUMARATE 400 MG/1
400 TABLET, FILM COATED ORAL NIGHTLY
Qty: 30 TABLET | Refills: 0 | Status: SHIPPED | OUTPATIENT
Start: 2024-02-23 | End: 2024-03-24

## 2024-02-23 RX ORDER — ATORVASTATIN CALCIUM 40 MG/1
40 TABLET, FILM COATED ORAL NIGHTLY
Qty: 30 TABLET | Refills: 3 | Status: SHIPPED | OUTPATIENT
Start: 2024-02-23

## 2024-02-23 RX ORDER — QUETIAPINE FUMARATE 200 MG/1
200 TABLET, FILM COATED ORAL DAILY
Qty: 30 TABLET | Refills: 0 | Status: SHIPPED | OUTPATIENT
Start: 2024-02-24 | End: 2024-03-25

## 2024-02-23 RX ADMIN — QUETIAPINE FUMARATE 200 MG: 200 TABLET ORAL at 09:02

## 2024-02-23 RX ADMIN — DIVALPROEX SODIUM 500 MG: 500 TABLET, DELAYED RELEASE ORAL at 09:02

## 2024-02-23 RX ADMIN — ACETAMINOPHEN 325MG 650 MG: 325 TABLET ORAL at 01:33

## 2024-02-23 RX ADMIN — HYDROXYZINE PAMOATE 50 MG: 50 CAPSULE ORAL at 15:12

## 2024-02-23 ASSESSMENT — PAIN SCALES - GENERAL: PAINLEVEL_OUTOF10: 6

## 2024-02-23 ASSESSMENT — PAIN DESCRIPTION - DESCRIPTORS: DESCRIPTORS: ACHING;DISCOMFORT

## 2024-02-23 ASSESSMENT — PAIN DESCRIPTION - ORIENTATION: ORIENTATION: RIGHT

## 2024-02-23 ASSESSMENT — PAIN DESCRIPTION - LOCATION: LOCATION: HIP;SHOULDER

## 2024-02-23 NOTE — PROGRESS NOTES
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 injection 5 mg, 5 mg, IntraMUSCular, Q6H PRN, Teresa Reardon MD    melatonin tablet 3 mg, 3 mg, Oral, Nightly PRN, Teresa Reardon MD, 3 mg at 02/16/24 2201      Examination:  /60   Pulse 86   Temp 97.8 °F (36.6 °C) (Temporal)   Resp 16   Ht 1.753 m (5' 9.02\")   Wt 68 kg (150 lb)   SpO2 97%   BMI 22.14 kg/m²   Gait - steady  Medication side effects(SE): none reported     Mental Status Examination:    Level of consciousness:  awake and somnolent   Appearance:  hospital attire, lying in bed, fair grooming, and fair hygiene  Behavior/Motor:  No psychomotor retardation or agitation does not appear to be talking to or watching unseen others, does not appear to be responding to unseen others  Attitude toward examiner:  superficially cooperative and forthcoming, poor eye contact  Speech:  spontaneous, normal rate, normal volume, and monotone   Mood: \"depressed\"  Affect:  mood congruent, blunted, and irritable  Thought processes:  linear and coherent with no looseness of association or flight of ideas  Thought content: Reports \"quite a bit of voices.\"  Denies visual hallucinations denies suicidal homicidal ideations intent or plan cognition:  oriented to person, place, and time   Concentration intact  Memory intact  Insight poor   Judgement poor   Fund of Knowledge good    ASSESSMENT: 
steady  Medication side effects(SE): None    Mental Status Examination:    Level of consciousness:  within normal limits   Appearance:  fair grooming and fair hygiene  Behavior/Motor:  Psychomotor agitation appearing to be bothered by unseen voices, no responding to the voices, no psychomotor retardation, patient is rocking around in bed trying to cover her ears, no odd posturing  Attitude toward examiner:  pt is cooperative and makes occasional eye contact  Speech:  spontaneous, normal rate, normal volume, and monotone   Mood: \"I can't sleep\"  Affect:  flat and anxious  Thought processes:  linear, goal directed, and coherent   Thought content:  preoccupied with the external voices she is hearing which are telling her to harm herself, she is not personally having suicidal or homicidal ideations intentions or plans, no visual hallucinations  Cognition:  oriented to person, place, and time   Concentration intact  Insight good   Judgement good     ASSESSMENT:   Patient symptoms are:  [] Well controlled  [x] Improving  [] Worsening  [] No change      Diagnosis:   Principal Problem:    Schizoaffective disorder, bipolar type ([facility])  Active Problems:    Cocaine abuse ([facility])  Resolved Problems:    MDD (major depressive disorder), single episode      LABS:    Recent Labs     02/14/24  0845   WBC 6.5   HGB 12.5        Recent Labs     02/14/24  0845      K 3.7   *   CO2 27   BUN 12   CREATININE 0.9   GLUCOSE 91     Recent Labs     02/14/24  0845   BILITOT 0.2   ALKPHOS 83   AST 13   ALT 6     Lab Results   Component Value Date/Time    LABAMPH NOT DETECTED 10/12/2022 04:58 PM    LABAMPH NOT DETECTED 04/27/2012 02:10 PM    BARBSCNU NEGATIVE 02/14/2024 08:46 AM    LABBENZ NEGATIVE 02/14/2024 08:46 AM    LABBENZ NOT DETECTED 04/27/2012 02:10 PM    CANNAB NOT DETECTED 07/02/2013 07:45 PM    COCAINESCRN POSITIVE 07/02/2013 07:45 PM    LABMETH NEGATIVE 02/14/2024 08:46 AM    OPIATESCREENURINE NOT DETECTED 10/12/2022

## 2024-02-23 NOTE — TRANSITION OF CARE
Behavioral Health Transition Record to Provider    Patient Name: Kathe Caldera  YOB: 1965   Medical Record Number: [id number]  Date of Admission: 2/14/2024  7:49 AM   Date of Discharge: 2/23/2024    Attending Provider: Teresa Reardon MD   Discharging Provider: Dr. Reardon  To contact this individual call [phone] and ask the  to page.  If unavailable, ask to be transferred to Behavioral Health Provider on call.  A Behavioral Health Provider will be available on call 24/7 and during holidays.    Primary Care Provider: Bubba Lopez MD    No Known Allergies    Reason for Admission:  Kathe Caldera has a history of schizoaffective disorder presented to the ED after several days of suicidal ideations secondary to command hallucinations telling her to cut herself. Precipitating factors include being off her medications and recently relapsed on crack cocaine. She has pink slipped by the ED physician     Admission Diagnosis: MDD (major depressive disorder), single episode [F32.9]  Depression with suicidal ideation [F32.A, R45.851]    * No surgery found *    Results for orders placed or performed during the hospital encounter of 02/14/24   CBC with Auto Differential   Result Value Ref Range    WBC 6.5 4.5 - 11.5 k/uL    RBC 4.56 3.50 - 5.50 m/uL    Hemoglobin 12.5 11.5 - 15.5 g/dL    Hematocrit 38.6 34.0 - 48.0 %    MCV 84.6 80.0 - 99.9 fL    MCH 27.4 26.0 - 35.0 pg    MCHC 32.4 32.0 - 34.5 g/dL    RDW 14.7 11.5 - 15.0 %    Platelets 351 130 - 450 k/uL    MPV 11.0 7.0 - 12.0 fL    Neutrophils % 45 43.0 - 80.0 %    Lymphocytes % 44 (H) 20.0 - 42.0 %    Monocytes % 6 2.0 - 12.0 %    Eosinophils % 4 0 - 6 %    Basophils % 0 0.0 - 2.0 %    Immature Granulocytes 0 0.0 - 5.0 %    Neutrophils Absolute 2.94 1.80 - 7.30 k/uL    Lymphocytes Absolute 2.87 1.50 - 4.00 k/uL    Monocytes Absolute 0.39 0.10 - 0.95 k/uL    Eosinophils Absolute 0.28 0.05 - 0.50 k/uL    Basophils Absolute 0.02 0.00 -

## 2024-02-23 NOTE — GROUP NOTE
Group Therapy Note    Date: 2/23/2024    Group Start Time: 1805  Group End Time: 1825  Group Topic: Wrap-Up    [facility]    Krissy Arana CTRS    Group Therapy Note    Attendees: 6    Date: 2/23/2024  Start Time: 1805  End Time:  1825  Number of Participants: 6    Type of Group: Wrap-Up    Patients discussed progress towards daily goals, ID highs/lows of the day, what was learned in group and what can be improved for tomorrow. Addressed any milieu issues and concerns. Patient reported feeling impatient about waiting for ride to discharge, CTRS provided validation and reassurance. Patient reported progress towards her goal today.     Status After Intervention:  Improved    Participation Level: Active Listener and Interactive    Participation Quality: Appropriate      Speech:  normal      Thought Process/Content: Logical  Linear      Affective Functioning: Congruent      Mood: anxious      Level of consciousness:  Preoccupied      Response to Learning: Progressing to goal      Endings: None Reported    Modes of Intervention: Education, Support, Socialization, Exploration, Clarifying, and Problem-solving      Discipline Responsible: Psychoeducational Specialist      Signature:  BRODY Corrales

## 2024-02-23 NOTE — GROUP NOTE
Group Therapy Note    Date: 2/23/2024  Start Time: 0750  End Time:  0805  Number of Participants: 10    Type of Group: Community Meeting    Wellness Binder Information  Module Name:  Community Meeting      Patient's Goal:  Patient will be oriented to the unit including but not limited expectations, staff, programming schedule.  Patient will be able to ID expectations of treatment.     Notes: Patient was a participant in community meeting, and was updated on expectations of the unit, staffing, programming schedule, and acclimated to their environment.    Patient shared goal for today as \"no voices\"    Status After Intervention:  Improved    Participation Level: Active Listener    Participation Quality: Appropriate and Attentive      Speech:  normal      Thought Process/Content: Logical      Affective Functioning: Congruent      Mood: anxious      Level of consciousness:  Alert and Attentive      Response to Learning: Able to verbalize current knowledge/experience, Able to verbalize/acknowledge new learning, and Progressing to goal      Endings: None Reported    Modes of Intervention: Education, Exploration, and Problem-solving      Discipline Responsible: Recreational Therapist      Signature:  BRODY Cortez

## 2024-02-23 NOTE — CARE COORDINATION
SHAWN spoke with Lico at [facility] who states that pt is accepted to [facility] today.     SHAWN met with pt to discuss discharge. Pt found in common area watching TV and socializing with peers. She states that she is feeling much better and feels ready to discharge to [facility] today. She denied SI/HI/AVH, states that she has no questions/concerns for SW at this time. Pt plans to follow up with Prashanth after discharge from [facility].     SHAWN spoke with pt sister Dayna Nagel [phone] (MONICA signed) to discuss pt discharge to [facility] today. No answer, SHAWN left voicemail.    In order to ensure appropriate transition and discharge planning is in place, the following documents have been transmitted to Prashanth, as the new outpatient provider:    The d/c diagnosis was transmitted to the next care provider  The reason for hospitalization was transmitted to the next care provider  The d/c medications (dosage and indication) were transmitted to the next care provider   The continuing care plan was transmitted to the next care provider

## 2024-02-23 NOTE — CARE COORDINATION
SHAWN contacted [facility]  to schedule transport for pt to [facility]. SHAWN spoke with ELVA who states that she will arrange transport. SHAWN instructed for transport to arrive at [facility] entrance and call nurse's station upon arrival, number provided.

## 2024-02-23 NOTE — CARE COORDINATION
SHAWN spoke with Vandana at [facility]  who states that they are still reviewing pt referral, will keep SW updated.

## 2024-02-23 NOTE — DISCHARGE SUMMARY
(TYLENOL) tablet 650 mg, 650 mg, Oral, Q6H PRN, Teresa Reardon MD, 650 mg at 02/23/24 0133    magnesium hydroxide (MILK OF MAGNESIA) 400 MG/5ML suspension 30 mL, 30 mL, Oral, Daily PRN, Teresa Reardon MD    nicotine (NICODERM CQ) 21 MG/24HR 1 patch, 1 patch, TransDERmal, Daily, Teresa Reardon MD    aluminum & magnesium hydroxide-simethicone (MAALOX) 200-200-20 MG/5ML suspension 30 mL, 30 mL, Oral, PRN, Teresa Reardon MD    hydrOXYzine pamoate (VISTARIL) capsule 50 mg, 50 mg, Oral, TID PRN, Teresa Reardon MD, 50 mg at 02/22/24 2131    haloperidol (HALDOL) tablet 5 mg, 5 mg, Oral, Q6H PRN, 5 mg at 02/14/24 2207 **OR** haloperidol lactate (HALDOL) injection 5 mg, 5 mg, IntraMUSCular, Q6H PRN, Teresa Reardon MD    melatonin tablet 3 mg, 3 mg, Oral, Nightly PRN, Teresa Reardon MD, 3 mg at 02/22/24 2131    Examination:  /64   Pulse 94   Temp 98.2 °F (36.8 °C) (Temporal)   Resp 17   Ht 1.753 m (5' 9.02\")   Wt 68 kg (150 lb)   SpO2 99%   BMI 22.14 kg/m²   Gait - steady    HOSPITAL COURSE::  Following admission to the hospital, patient had a complete physical exam and blood work up, which she was medically cleared and admitted to [facility] for psychiatric evaluation and stabilization.  The patient was monitored closely with suicide and appropriate precautions.  She was started on Depakote 500 mg twice daily for stabilization of mood, Seroquel 200 mg daily and Seroquel 400 mg nightly she stabilized quickly with this course of medications and treatment.  She was no longer suicidal, she was future focused looking forward to discharging to the crisis unit. She was encouraged to participate in group and other milieu activity and started to feel better with this combination of treatment.  There has been significant progress in the improvement of symptoms since admission. The patient has been an active participant in his treatment, and discharge planning. The patient was able to spontaneously

## 2024-02-23 NOTE — PLAN OF CARE
Problem: Anxiety  Goal: Will report anxiety at manageable levels  Description: INTERVENTIONS:  1. Administer medication as ordered  2. Teach and rehearse alternative coping skills  3. Provide emotional support with 1:1 interaction with staff  2/15/2024 0825 by Abiola Go RN  Outcome: Progressing  2/15/2024 0300 by Teresa Dodd RN  Outcome: Progressing     Problem: Coping  Goal: Pt/Family able to verbalize concerns and demonstrate effective coping strategies  Description: INTERVENTIONS:  1. Assist patient/family to identify coping skills, available support systems and cultural and spiritual values  2. Provide emotional support, including active listening and acknowledgement of concerns of patient and caregivers  3. Reduce environmental stimuli, as able  4. Instruct patient/family in relaxation techniques, as appropriate  5. Assess for spiritual pain/suffering and initiate Spiritual Care, Psychosocial Clinical Specialist consults as needed  2/15/2024 0825 by Abiola Go RN  Outcome: Progressing  2/15/2024 0300 by Teresa Dodd RN  Outcome: Not Progressing     Problem: Decision Making  Goal: Pt/Family able to effectively weigh alternatives and participate in decision making related to treatment and care  Description: INTERVENTIONS:  1. Determine when there are differences between patient's view, family's view, and healthcare provider's view of condition  2. Facilitate patient and family articulation of goals for care  3. Help patient and family identify pros/cons of alternative solutions  4. Provide information as requested by patient/family  5. Respect patient/family right to receive or not to receive information  6. Serve as a liaison between patient and family and health care team  7. Initiate Consults from Ethics, Palliative Care or initiate Family Care Conference as is appropriate  Outcome: Progressing     Problem: Coping  Goal: Pt/Family able to verbalize concerns and demonstrate effective 
  Problem: Anxiety  Goal: Will report anxiety at manageable levels  Description: INTERVENTIONS:  1. Administer medication as ordered  2. Teach and rehearse alternative coping skills  3. Provide emotional support with 1:1 interaction with staff  2/15/2024 2158 by Charito Castillo, RN  Outcome: Progressing     Problem: Coping  Goal: Pt/Family able to verbalize concerns and demonstrate effective coping strategies  Description: INTERVENTIONS:  1. Assist patient/family to identify coping skills, available support systems and cultural and spiritual values  2. Provide emotional support, including active listening and acknowledgement of concerns of patient and caregivers  3. Reduce environmental stimuli, as able  4. Instruct patient/family in relaxation techniques, as appropriate  5. Assess for spiritual pain/suffering and initiate Spiritual Care, Psychosocial Clinical Specialist consults as needed  2/15/2024 2158 by Charito Castillo, RN  Outcome: Progressing     
  Problem: Anxiety  Goal: Will report anxiety at manageable levels  Description: INTERVENTIONS:  1. Administer medication as ordered  2. Teach and rehearse alternative coping skills  3. Provide emotional support with 1:1 interaction with staff  2/18/2024 0133 by Teresa Dodd RN  Outcome: Progressing     Problem: Coping  Goal: Pt/Family able to verbalize concerns and demonstrate effective coping strategies  Description: INTERVENTIONS:  1. Assist patient/family to identify coping skills, available support systems and cultural and spiritual values  2. Provide emotional support, including active listening and acknowledgement of concerns of patient and caregivers  3. Reduce environmental stimuli, as able  4. Instruct patient/family in relaxation techniques, as appropriate  5. Assess for spiritual pain/suffering and initiate Spiritual Care, Psychosocial Clinical Specialist consults as needed  2/18/2024 0943 by Abiola Go RN  Outcome: Progressing  2/18/2024 0133 by Teresa Dodd RN  Outcome: Progressing     Problem: Decision Making  Goal: Pt/Family able to effectively weigh alternatives and participate in decision making related to treatment and care  Description: INTERVENTIONS:  1. Determine when there are differences between patient's view, family's view, and healthcare provider's view of condition  2. Facilitate patient and family articulation of goals for care  3. Help patient and family identify pros/cons of alternative solutions  4. Provide information as requested by patient/family  5. Respect patient/family right to receive or not to receive information  6. Serve as a liaison between patient and family and health care team  7. Initiate Consults from Ethics, Palliative Care or initiate Family Care Conference as is appropriate  Outcome: Progressing     
  Problem: Anxiety  Goal: Will report anxiety at manageable levels  Description: INTERVENTIONS:  1. Administer medication as ordered  2. Teach and rehearse alternative coping skills  3. Provide emotional support with 1:1 interaction with staff  2/19/2024 2233 by Teresa Dodd RN  Outcome: Not Progressing  2/19/2024 0859 by Li Mak RN  Outcome: Progressing     Problem: Coping  Goal: Pt/Family able to verbalize concerns and demonstrate effective coping strategies  Description: INTERVENTIONS:  1. Assist patient/family to identify coping skills, available support systems and cultural and spiritual values  2. Provide emotional support, including active listening and acknowledgement of concerns of patient and caregivers  3. Reduce environmental stimuli, as able  4. Instruct patient/family in relaxation techniques, as appropriate  5. Assess for spiritual pain/suffering and initiate Spiritual Care, Psychosocial Clinical Specialist consults as needed  2/19/2024 2233 by Teresa Dodd RN  Outcome: Not Progressing  2/19/2024 0859 by Li Mak RN  Outcome: Progressing     Problem: Depression/Self Harm  Goal: Effect of psychiatric condition will be minimized and patient will be protected from self harm  Description: INTERVENTIONS:  1. Assess impact of patient's symptoms on level of functioning, self care needs and offer support as indicated  2. Assess patient/family knowledge of depression, impact on illness and need for teaching  3. Provide emotional support, presence and reassurance  4. Assess for possible suicidal thoughts or ideation. If patient expresses suicidal thoughts or statements do not leave alone, initiate Suicide Precautions, move to a room close to the nursing station and obtain sitter  5. Initiate consults as appropriate with Mental Health Professional, Spiritual Care, Psychosocial CNS, and consider a recommendation to the LIP for a Psychiatric Consultation  2/19/2024 2233 by Teresa Dodd 
  Problem: Anxiety  Goal: Will report anxiety at manageable levels  Description: INTERVENTIONS:  1. Administer medication as ordered  2. Teach and rehearse alternative coping skills  3. Provide emotional support with 1:1 interaction with staff  2/20/2024 0900 by Abiola Go RN  Outcome: Progressing  2/19/2024 2233 by Teresa Dodd RN  Outcome: Not Progressing     Problem: Coping  Goal: Pt/Family able to verbalize concerns and demonstrate effective coping strategies  Description: INTERVENTIONS:  1. Assist patient/family to identify coping skills, available support systems and cultural and spiritual values  2. Provide emotional support, including active listening and acknowledgement of concerns of patient and caregivers  3. Reduce environmental stimuli, as able  4. Instruct patient/family in relaxation techniques, as appropriate  5. Assess for spiritual pain/suffering and initiate Spiritual Care, Psychosocial Clinical Specialist consults as needed  2/20/2024 0900 by Abiola Go RN  Outcome: Progressing  2/19/2024 2233 by Teresa Dodd RN  Outcome: Not Progressing     Problem: Decision Making  Goal: Pt/Family able to effectively weigh alternatives and participate in decision making related to treatment and care  Description: INTERVENTIONS:  1. Determine when there are differences between patient's view, family's view, and healthcare provider's view of condition  2. Facilitate patient and family articulation of goals for care  3. Help patient and family identify pros/cons of alternative solutions  4. Provide information as requested by patient/family  5. Respect patient/family right to receive or not to receive information  6. Serve as a liaison between patient and family and health care team  7. Initiate Consults from Ethics, Palliative Care or initiate Family Care Conference as is appropriate  Outcome: Progressing     Problem: Anxiety  Goal: Will report anxiety at manageable levels  Description: 
  Problem: Anxiety  Goal: Will report anxiety at manageable levels  Description: INTERVENTIONS:  1. Administer medication as ordered  2. Teach and rehearse alternative coping skills  3. Provide emotional support with 1:1 interaction with staff  Outcome: Progressing     Problem: Confusion  Goal: Confusion, delirium, dementia, or psychosis is improved or at baseline  Description: INTERVENTIONS:  1. Assess for possible contributors to thought disturbance, including medications, impaired vision or hearing, underlying metabolic abnormalities, dehydration, psychiatric diagnoses, and notify attending LIP  2. [place] high risk fall precautions, as indicated  3. Provide frequent short contacts to provide reality reorientation, refocusing and direction  4. Decrease environmental stimuli, including noise as appropriate  5. Monitor and intervene to maintain adequate nutrition, hydration, elimination, sleep and activity  6. If unable to ensure safety without constant attention obtain sitter and review sitter guidelines with assigned personnel  7. Initiate Psychosocial CNS and Spiritual Care consult, as indicated  Outcome: Progressing     Problem: Behavior  Goal: Pt/Family maintain appropriate behavior and adhere to behavioral management agreement, if implemented  Description: INTERVENTIONS:  1. Assess patient/family's coping skills and  non-compliant behavior (including use of illegal substances)  2. Notify security of behavior or suspected illegal substances which indicate the need for search of the family and/or belongings  3. Encourage verbalization of thoughts and concerns in a socially appropriate manner  4. Utilize positive, consistent limit setting strategies supporting safety of patient, staff and others  5. Encourage participation in the decision making process about the behavioral management agreement  6. If a visitor's behavior poses a threat to safety call refer to organization policy.  7. Initiate consult with 
  Problem: Anxiety  Goal: Will report anxiety at manageable levels  Description: INTERVENTIONS:  1. Administer medication as ordered  2. Teach and rehearse alternative coping skills  3. Provide emotional support with 1:1 interaction with staff  Outcome: Progressing     Problem: Coping  Goal: Pt/Family able to verbalize concerns and demonstrate effective coping strategies  Description: INTERVENTIONS:  1. Assist patient/family to identify coping skills, available support systems and cultural and spiritual values  2. Provide emotional support, including active listening and acknowledgement of concerns of patient and caregivers  3. Reduce environmental stimuli, as able  4. Instruct patient/family in relaxation techniques, as appropriate  5. Assess for spiritual pain/suffering and initiate Spiritual Care, Psychosocial Clinical Specialist consults as needed  Outcome: Progressing     Problem: Depression/Self Harm  Goal: Effect of psychiatric condition will be minimized and patient will be protected from self harm  Description: INTERVENTIONS:  1. Assess impact of patient's symptoms on level of functioning, self care needs and offer support as indicated  2. Assess patient/family knowledge of depression, impact on illness and need for teaching  3. Provide emotional support, presence and reassurance  4. Assess for possible suicidal thoughts or ideation. If patient expresses suicidal thoughts or statements do not leave alone, initiate Suicide Precautions, move to a room close to the nursing station and obtain sitter  5. Initiate consults as appropriate with Mental Health Professional, Spiritual Care, Psychosocial CNS, and consider a recommendation to the LIP for a Psychiatric Consultation  Outcome: Progressing     
  Problem: Risk for Elopement  Goal: Patient will not exit the unit/facility without proper excort  Outcome: Progressing     Problem: Anxiety  Goal: Will report anxiety at manageable levels  Description: INTERVENTIONS:  1. Administer medication as ordered  2. Teach and rehearse alternative coping skills  3. Provide emotional support with 1:1 interaction with staff  Outcome: Progressing     Problem: Coping  Goal: Pt/Family able to verbalize concerns and demonstrate effective coping strategies  Description: INTERVENTIONS:  1. Assist patient/family to identify coping skills, available support systems and cultural and spiritual values  2. Provide emotional support, including active listening and acknowledgement of concerns of patient and caregivers  3. Reduce environmental stimuli, as able  4. Instruct patient/family in relaxation techniques, as appropriate  5. Assess for spiritual pain/suffering and initiate Spiritual Care, Psychosocial Clinical Specialist consults as needed  Outcome: Progressing     
  Problem: Risk for Elopement  Goal: Patient will not exit the unit/facility without proper excort  Outcome: Progressing     Problem: Anxiety  Goal: Will report anxiety at manageable levels  Description: INTERVENTIONS:  1. Administer medication as ordered  2. Teach and rehearse alternative coping skills  3. Provide emotional support with 1:1 interaction with staff  Outcome: Progressing     Problem: Coping  Goal: Pt/Family able to verbalize concerns and demonstrate effective coping strategies  Description: INTERVENTIONS:  1. Assist patient/family to identify coping skills, available support systems and cultural and spiritual values  2. Provide emotional support, including active listening and acknowledgement of concerns of patient and caregivers  3. Reduce environmental stimuli, as able  4. Instruct patient/family in relaxation techniques, as appropriate  5. Assess for spiritual pain/suffering and initiate Spiritual Care, Psychosocial Clinical Specialist consults as needed  Outcome: Progressing     Problem: Confusion  Goal: Confusion, delirium, dementia, or psychosis is improved or at baseline  Description: INTERVENTIONS:  1. Assess for possible contributors to thought disturbance, including medications, impaired vision or hearing, underlying metabolic abnormalities, dehydration, psychiatric diagnoses, and notify attending LIP  2. [place] high risk fall precautions, as indicated  3. Provide frequent short contacts to provide reality reorientation, refocusing and direction  4. Decrease environmental stimuli, including noise as appropriate  5. Monitor and intervene to maintain adequate nutrition, hydration, elimination, sleep and activity  6. If unable to ensure safety without constant attention obtain sitter and review sitter guidelines with assigned personnel  7. Initiate Psychosocial CNS and Spiritual Care consult, as indicated  Outcome: Progressing     Problem: Behavior  Goal: Pt/Family maintain appropriate 
  Problem: Risk for Elopement  Goal: Patient will not exit the unit/facility without proper excort  Outcome: Progressing     Problem: Anxiety  Goal: Will report anxiety at manageable levels  Description: INTERVENTIONS:  1. Administer medication as ordered  2. Teach and rehearse alternative coping skills  3. Provide emotional support with 1:1 interaction with staff  Outcome: Progressing     Problem: Coping  Goal: Pt/Family able to verbalize concerns and demonstrate effective coping strategies  Description: INTERVENTIONS:  1. Assist patient/family to identify coping skills, available support systems and cultural and spiritual values  2. Provide emotional support, including active listening and acknowledgement of concerns of patient and caregivers  3. Reduce environmental stimuli, as able  4. Instruct patient/family in relaxation techniques, as appropriate  5. Assess for spiritual pain/suffering and initiate Spiritual Care, Psychosocial Clinical Specialist consults as needed  Outcome: Progressing     Problem: Confusion  Goal: Confusion, delirium, dementia, or psychosis is improved or at baseline  Description: INTERVENTIONS:  1. Assess for possible contributors to thought disturbance, including medications, impaired vision or hearing, underlying metabolic abnormalities, dehydration, psychiatric diagnoses, and notify attending LIP  2. [place] high risk fall precautions, as indicated  3. Provide frequent short contacts to provide reality reorientation, refocusing and direction  4. Decrease environmental stimuli, including noise as appropriate  5. Monitor and intervene to maintain adequate nutrition, hydration, elimination, sleep and activity  6. If unable to ensure safety without constant attention obtain sitter and review sitter guidelines with assigned personnel  7. Initiate Psychosocial CNS and Spiritual Care consult, as indicated  Outcome: Progressing     Problem: Behavior  Goal: Pt/Family maintain appropriate 
  Problem: Risk for Elopement  Goal: Patient will not exit the unit/facility without proper excort  Outcome: Progressing     Problem: Coping  Goal: Pt/Family able to verbalize concerns and demonstrate effective coping strategies  Description: INTERVENTIONS:  1. Assist patient/family to identify coping skills, available support systems and cultural and spiritual values  2. Provide emotional support, including active listening and acknowledgement of concerns of patient and caregivers  3. Reduce environmental stimuli, as able  4. Instruct patient/family in relaxation techniques, as appropriate  5. Assess for spiritual pain/suffering and initiate Spiritual Care, Psychosocial Clinical Specialist consults as needed  Outcome: Progressing     Problem: Confusion  Goal: Confusion, delirium, dementia, or psychosis is improved or at baseline  Description: INTERVENTIONS:  1. Assess for possible contributors to thought disturbance, including medications, impaired vision or hearing, underlying metabolic abnormalities, dehydration, psychiatric diagnoses, and notify attending LIP  2. [place] high risk fall precautions, as indicated  3. Provide frequent short contacts to provide reality reorientation, refocusing and direction  4. Decrease environmental stimuli, including noise as appropriate  5. Monitor and intervene to maintain adequate nutrition, hydration, elimination, sleep and activity  6. If unable to ensure safety without constant attention obtain sitter and review sitter guidelines with assigned personnel  7. Initiate Psychosocial CNS and Spiritual Care consult, as indicated  Outcome: Progressing     
Denies SI/HI  denies hallucinations  isolative to room most of this shift    cooperative with meds  attending groups will continue to monitor  
Patient alert and oriented denies SI/HI does not report auditory or visual hallucinations.  She is isolative to her room social with peers.  Reports some anxiety but appears depressed and flat.  She is taking meds denies any side effects.  Sleep reported as good.    Problem: Risk for Elopement  Goal: Patient will not exit the unit/facility without proper excort  2/23/2024 0948 by Briana Peters, RN  Outcome: Progressing     Problem: Anxiety  Goal: Will report anxiety at manageable levels  Description: INTERVENTIONS:  1. Administer medication as ordered  2. Teach and rehearse alternative coping skills  3. Provide emotional support with 1:1 interaction with staff  Outcome: Progressing     Problem: Coping  Goal: Pt/Family able to verbalize concerns and demonstrate effective coping strategies  Description: INTERVENTIONS:  1. Assist patient/family to identify coping skills, available support systems and cultural and spiritual values  2. Provide emotional support, including active listening and acknowledgement of concerns of patient and caregivers  3. Reduce environmental stimuli, as able  4. Instruct patient/family in relaxation techniques, as appropriate  5. Assess for spiritual pain/suffering and initiate Spiritual Care, Psychosocial Clinical Specialist consults as needed  2/23/2024 0948 by Briana Peters, RN  Outcome: Progressing     Problem: Death & Dying  Goal: Pt/Family communicate acceptance of impending death and feel psychological comfort and peace  Description: INTERVENTIONS:  1. Assess patient/family anxiety and grief process related to end of life issues  2. Provide emotional and spiritual support  3. Provide information about the patient's health status with consideration of family and cultural values  4. Communicate willingness to discuss death and facilitate grief process  with patient/family as appropriate  5. Emphasize sustaining relationships within family system and community, or valeri/spiritual 
Pt reports suicidal ideations, she states she is hearing voices that are telling her to hurt herself. She denies having a plan. She denies homicidal ideations and visual hallucinations. She has poor eye contact, is guarded, and isolative to self. She is sad, depressed, and tearful. She reports not sleeping well. She is compliant with medications and is attending select groups.    Problem: Risk for Elopement  Goal: Patient will not exit the unit/facility without proper excort  Outcome: Progressing     Problem: Anxiety  Goal: Will report anxiety at manageable levels  Description: INTERVENTIONS:  1. Administer medication as ordered  2. Teach and rehearse alternative coping skills  3. Provide emotional support with 1:1 interaction with staff  2/19/2024 0883 by Li Mak RN  Outcome: Progressing     Problem: Coping  Goal: Pt/Family able to verbalize concerns and demonstrate effective coping strategies  Description: INTERVENTIONS:  1. Assist patient/family to identify coping skills, available support systems and cultural and spiritual values  2. Provide emotional support, including active listening and acknowledgement of concerns of patient and caregivers  3. Reduce environmental stimuli, as able  4. Instruct patient/family in relaxation techniques, as appropriate  5. Assess for spiritual pain/suffering and initiate Spiritual Care, Psychosocial Clinical Specialist consults as needed  Outcome: Progressing     Problem: Death & Dying  Goal: Pt/Family communicate acceptance of impending death and feel psychological comfort and peace  Description: INTERVENTIONS:  1. Assess patient/family anxiety and grief process related to end of life issues  2. Provide emotional and spiritual support  3. Provide information about the patient's health status with consideration of family and cultural values  4. Communicate willingness to discuss death and facilitate grief process  with patient/family as appropriate  5. Emphasize 
anxiety at manageable levels  Description: INTERVENTIONS:  1. Administer medication as ordered  2. Teach and rehearse alternative coping skills  3. Provide emotional support with 1:1 interaction with staff  2/15/2024 0300 by Teresa Dodd RN  Outcome: Progressing     Problem: Confusion  Goal: Confusion, delirium, dementia, or psychosis is improved or at baseline  Description: INTERVENTIONS:  1. Assess for possible contributors to thought disturbance, including medications, impaired vision or hearing, underlying metabolic abnormalities, dehydration, psychiatric diagnoses, and notify attending LIP  2. [place] high risk fall precautions, as indicated  3. Provide frequent short contacts to provide reality reorientation, refocusing and direction  4. Decrease environmental stimuli, including noise as appropriate  5. Monitor and intervene to maintain adequate nutrition, hydration, elimination, sleep and activity  6. If unable to ensure safety without constant attention obtain sitter and review sitter guidelines with assigned personnel  7. Initiate Psychosocial CNS and Spiritual Care consult, as indicated  Outcome: Progressing     Problem: Behavior  Goal: Pt/Family maintain appropriate behavior and adhere to behavioral management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

## 2024-02-23 NOTE — BH NOTE
Patient denies suicidal ideation, homicidal ideations and AVH. Reports anxiety 3/10 and depression 0/10. Flat and sad.  Presents calm and cooperative during assessment.  Patient is out on the unit and is social with peers.  Medications taken without issue.  No complaints or concerns verbalized at this time.  No unit problems reported.  Will continue to observe and support.

## 2024-02-23 NOTE — CARE COORDINATION
SHAWN received a call from Lico at the [facility] stating that the pt was accepted today. SHAWN LVM for assigned SW to inform her of this information.

## 2024-02-24 NOTE — CARE COORDINATION
SHAWN contacted pt for follow up phone call. Confirmed with staff at [facility] that pt is still there. No further concerns noted at this time.

## 2024-02-28 RX ORDER — ATORVASTATIN CALCIUM 40 MG/1
40 TABLET, FILM COATED ORAL NIGHTLY
Qty: 90 TABLET | Refills: 1 | Status: SHIPPED | OUTPATIENT
Start: 2024-02-28

## 2024-03-11 ENCOUNTER — HOSPITAL ENCOUNTER (EMERGENCY)
Age: 59
Discharge: HOME | End: 2024-03-11
Attending: EMERGENCY MEDICINE
Payer: MEDICAID

## 2024-03-11 ENCOUNTER — APPOINTMENT (OUTPATIENT)
Dept: CT IMAGING | Age: 59
End: 2024-03-11
Payer: MEDICAID

## 2024-03-11 VITALS
SYSTOLIC BLOOD PRESSURE: 135 MMHG | HEIGHT: 70 IN | TEMPERATURE: 98.6 F | DIASTOLIC BLOOD PRESSURE: 70 MMHG | RESPIRATION RATE: 16 BRPM | OXYGEN SATURATION: 100 % | HEART RATE: 70 BPM | WEIGHT: 187 LBS | BODY MASS INDEX: 26.77 KG/M2
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
## 2024-03-11 DIAGNOSIS — N39.0 URINARY TRACT INFECTION IN FEMALE: Primary | ICD-10-CM

## 2024-03-11 DIAGNOSIS — M54.50 ACUTE RIGHT-SIDED LOW BACK PAIN WITHOUT SCIATICA: ICD-10-CM

## 2024-03-11 LAB
ALBUMIN SERPL-MCNC: 3.9 G/DL (ref 3.5–5.2)
ALP SERPL-CCNC: 67 U/L (ref 35–104)
ALT SERPL-CCNC: 9 U/L (ref 0–32)
ANION GAP SERPL CALCULATED.3IONS-SCNC: 11 MMOL/L (ref 7–16)
AST SERPL-CCNC: 23 U/L (ref 0–31)
BACTERIA URNS QL MICRO: ABNORMAL
BASOPHILS # BLD: 0.02 K/UL (ref 0–0.2)
BASOPHILS NFR BLD: 0 % (ref 0–2)
BILIRUB SERPL-MCNC: 0.2 MG/DL (ref 0–1.2)
BILIRUB UR QL STRIP: NEGATIVE
BUN SERPL-MCNC: 17 MG/DL (ref 6–20)
CALCIUM SERPL-MCNC: 8.8 MG/DL (ref 8.6–10.2)
CHLORIDE SERPL-SCNC: 99 MMOL/L (ref 98–107)
CLARITY UR: CLEAR
CO2 SERPL-SCNC: 23 MMOL/L (ref 22–29)
COLOR UR: YELLOW
CREAT SERPL-MCNC: 0.9 MG/DL (ref 0.5–1)
EOSINOPHIL # BLD: 0.11 K/UL (ref 0.05–0.5)
EOSINOPHILS RELATIVE PERCENT: 1 % (ref 0–6)
EPI CELLS #/AREA URNS HPF: ABNORMAL /HPF
ERYTHROCYTE [DISTWIDTH] IN BLOOD BY AUTOMATED COUNT: 15.5 % (ref 11.5–15)
GFR SERPL CREATININE-BSD FRML MDRD: >60 ML/MIN/1.73M2
GLUCOSE SERPL-MCNC: 98 MG/DL (ref 74–99)
GLUCOSE UR STRIP-MCNC: NEGATIVE MG/DL
HCT VFR BLD AUTO: 37.4 % (ref 34–48)
HGB BLD-MCNC: 11.9 G/DL (ref 11.5–15.5)
HGB UR QL STRIP.AUTO: ABNORMAL
IMM GRANULOCYTES # BLD AUTO: <0.03 K/UL (ref 0–0.58)
IMM GRANULOCYTES NFR BLD: 0 % (ref 0–5)
KETONES UR STRIP-MCNC: ABNORMAL MG/DL
LACTATE BLDV-SCNC: 1.6 MMOL/L (ref 0.5–2.2)
LEUKOCYTE ESTERASE UR QL STRIP: ABNORMAL
LYMPHOCYTES NFR BLD: 3.81 K/UL (ref 1.5–4)
LYMPHOCYTES RELATIVE PERCENT: 43 % (ref 20–42)
MCH RBC QN AUTO: 26.7 PG (ref 26–35)
MCHC RBC AUTO-ENTMCNC: 31.8 G/DL (ref 32–34.5)
MCV RBC AUTO: 84 FL (ref 80–99.9)
MONOCYTES NFR BLD: 0.6 K/UL (ref 0.1–0.95)
MONOCYTES NFR BLD: 7 % (ref 2–12)
NEUTROPHILS NFR BLD: 49 % (ref 43–80)
NEUTS SEG NFR BLD: 4.35 K/UL (ref 1.8–7.3)
NITRITE UR QL STRIP: NEGATIVE
PH UR STRIP: 7 [PH] (ref 5–9)
PLATELET # BLD AUTO: 267 K/UL (ref 130–450)
PMV BLD AUTO: 11.2 FL (ref 7–12)
POTASSIUM SERPL-SCNC: 5.2 MMOL/L (ref 3.5–5)
PROT SERPL-MCNC: 7.5 G/DL (ref 6.4–8.3)
PROT UR STRIP-MCNC: NEGATIVE MG/DL
RBC # BLD AUTO: 4.45 M/UL (ref 3.5–5.5)
RBC #/AREA URNS HPF: ABNORMAL /HPF
SODIUM SERPL-SCNC: 133 MMOL/L (ref 132–146)
SP GR UR STRIP: 1.01 (ref 1–1.03)
UROBILINOGEN UR STRIP-ACNC: 2 EU/DL (ref 0–1)
WBC #/AREA URNS HPF: ABNORMAL /HPF
WBC OTHER # BLD: 8.9 K/UL (ref 4.5–11.5)

## 2024-03-11 PROCEDURE — 70450 CT HEAD/BRAIN W/O DYE: CPT

## 2024-03-11 PROCEDURE — 6360000004 HC RX CONTRAST MEDICATION: Performed by: RADIOLOGY

## 2024-03-11 PROCEDURE — 72131 CT LUMBAR SPINE W/O DYE: CPT

## 2024-03-11 PROCEDURE — 99285 EMERGENCY DEPT VISIT HI MDM: CPT

## 2024-03-11 PROCEDURE — 6360000002 HC RX W HCPCS

## 2024-03-11 PROCEDURE — 81001 URINALYSIS AUTO W/SCOPE: CPT

## 2024-03-11 PROCEDURE — 74177 CT ABD & PELVIS W/CONTRAST: CPT

## 2024-03-11 PROCEDURE — 80053 COMPREHEN METABOLIC PANEL: CPT

## 2024-03-11 PROCEDURE — 2580000003 HC RX 258

## 2024-03-11 PROCEDURE — 6370000000 HC RX 637 (ALT 250 FOR IP)

## 2024-03-11 PROCEDURE — 72125 CT NECK SPINE W/O DYE: CPT

## 2024-03-11 PROCEDURE — 96372 THER/PROPH/DIAG INJ SC/IM: CPT

## 2024-03-11 PROCEDURE — 96374 THER/PROPH/DIAG INJ IV PUSH: CPT

## 2024-03-11 PROCEDURE — 83605 ASSAY OF LACTIC ACID: CPT

## 2024-03-11 PROCEDURE — 85025 COMPLETE CBC W/AUTO DIFF WBC: CPT

## 2024-03-11 RX ORDER — HYDROCODONE BITARTRATE AND ACETAMINOPHEN 5; 325 MG/1; MG/1
1 TABLET ORAL ONCE
Status: COMPLETED | OUTPATIENT
Start: 2024-03-11 | End: 2024-03-11

## 2024-03-11 RX ORDER — MORPHINE SULFATE 2 MG/ML
2 INJECTION, SOLUTION INTRAMUSCULAR; INTRAVENOUS ONCE
Status: COMPLETED | OUTPATIENT
Start: 2024-03-11 | End: 2024-03-11

## 2024-03-11 RX ORDER — CEFDINIR 300 MG/1
300 CAPSULE ORAL 2 TIMES DAILY
Qty: 14 CAPSULE | Refills: 0 | Status: SHIPPED | OUTPATIENT
Start: 2024-03-11 | End: 2024-03-18

## 2024-03-11 RX ADMIN — CEFTRIAXONE SODIUM 2000 MG: 2 INJECTION, POWDER, FOR SOLUTION INTRAMUSCULAR; INTRAVENOUS at 19:36

## 2024-03-11 RX ADMIN — HYDROCODONE BITARTRATE AND ACETAMINOPHEN 1 TABLET: 5; 325 TABLET ORAL at 14:54

## 2024-03-11 RX ADMIN — MORPHINE SULFATE 2 MG: 2 INJECTION, SOLUTION INTRAMUSCULAR; INTRAVENOUS at 19:35

## 2024-03-11 RX ADMIN — IOPAMIDOL 85 ML: 755 INJECTION, SOLUTION INTRAVENOUS at 20:17

## 2024-03-11 ASSESSMENT — ENCOUNTER SYMPTOMS
COUGH: 0
VOMITING: 0
PHOTOPHOBIA: 0
ABDOMINAL PAIN: 0
CHEST TIGHTNESS: 0
SHORTNESS OF BREATH: 0
DIARRHEA: 0
RHINORRHEA: 0
SORE THROAT: 0
NAUSEA: 0
BACK PAIN: 1

## 2024-03-11 ASSESSMENT — PAIN SCALES - GENERAL
PAINLEVEL_OUTOF10: 10
PAINLEVEL_OUTOF10: 10

## 2024-03-11 ASSESSMENT — PAIN DESCRIPTION - DESCRIPTORS
DESCRIPTORS: ACHING;THROBBING
DESCRIPTORS: SHARP;STABBING;DISCOMFORT

## 2024-03-11 ASSESSMENT — PAIN DESCRIPTION - ORIENTATION: ORIENTATION: RIGHT

## 2024-03-11 ASSESSMENT — PAIN DESCRIPTION - LOCATION
LOCATION: FLANK;PERINEUM
LOCATION: LEG;KNEE;BACK
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
## 2024-03-12 NOTE — ED NOTES
Pt requested at this time to call the [facility] to have the facility to establish discharge transportation back to the facility. Per the [facility] staff, they do not have the ability at this time to transport the Pt and the Pt will have to be liable for transportation. Pt updated at this time.

## 2024-03-12 NOTE — DISCHARGE INSTRUCTIONS
Please return to the emergency department if develop new or worsening symptoms.  Use tylenol and ibuprofen for pain
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
## 2024-03-12 NOTE — ED NOTES
Pt assisted to lobby and is awaiting taxy. Teena Macdonald notified and will assist Pt out to taxi upon arrival.

## 2024-03-18 ENCOUNTER — OFFICE VISIT (OUTPATIENT)
Age: 59
End: 2024-03-18
Payer: MEDICAID

## 2024-03-18 VITALS
OXYGEN SATURATION: 98 % | HEIGHT: 70 IN | BODY MASS INDEX: 27.92 KG/M2 | DIASTOLIC BLOOD PRESSURE: 68 MMHG | SYSTOLIC BLOOD PRESSURE: 124 MMHG | RESPIRATION RATE: 16 BRPM | TEMPERATURE: 97.5 F | WEIGHT: 195 LBS | HEART RATE: 72 BPM

## 2024-03-18 DIAGNOSIS — M25.551 RIGHT HIP PAIN: ICD-10-CM

## 2024-03-18 DIAGNOSIS — J44.9 CHRONIC OBSTRUCTIVE PULMONARY DISEASE, UNSPECIFIED COPD TYPE (HCC): ICD-10-CM

## 2024-03-18 DIAGNOSIS — G89.29 CHRONIC RIGHT SHOULDER PAIN: ICD-10-CM

## 2024-03-18 DIAGNOSIS — I10 PRIMARY HYPERTENSION: Primary | ICD-10-CM

## 2024-03-18 DIAGNOSIS — M25.511 CHRONIC RIGHT SHOULDER PAIN: ICD-10-CM

## 2024-03-18 DIAGNOSIS — E78.2 MIXED HYPERLIPIDEMIA: ICD-10-CM

## 2024-03-18 DIAGNOSIS — K21.9 GASTROESOPHAGEAL REFLUX DISEASE WITHOUT ESOPHAGITIS: ICD-10-CM

## 2024-03-18 DIAGNOSIS — F25.9 SCHIZO-AFFECTIVE TYPE SCHIZOPHRENIA, CHRONIC STATE (HCC): ICD-10-CM

## 2024-03-18 PROCEDURE — 4004F PT TOBACCO SCREEN RCVD TLK: CPT | Performed by: FAMILY MEDICINE

## 2024-03-18 PROCEDURE — G8484 FLU IMMUNIZE NO ADMIN: HCPCS | Performed by: FAMILY MEDICINE

## 2024-03-18 PROCEDURE — 1111F DSCHRG MED/CURRENT MED MERGE: CPT | Performed by: FAMILY MEDICINE

## 2024-03-18 PROCEDURE — 99214 OFFICE O/P EST MOD 30 MIN: CPT | Performed by: FAMILY MEDICINE

## 2024-03-18 PROCEDURE — 3017F COLORECTAL CA SCREEN DOC REV: CPT | Performed by: FAMILY MEDICINE

## 2024-03-18 PROCEDURE — G8419 CALC BMI OUT NRM PARAM NOF/U: HCPCS | Performed by: FAMILY MEDICINE

## 2024-03-18 PROCEDURE — 3078F DIAST BP <80 MM HG: CPT | Performed by: FAMILY MEDICINE

## 2024-03-18 PROCEDURE — 3023F SPIROM DOC REV: CPT | Performed by: FAMILY MEDICINE

## 2024-03-18 PROCEDURE — G8427 DOCREV CUR MEDS BY ELIG CLIN: HCPCS | Performed by: FAMILY MEDICINE

## 2024-03-18 PROCEDURE — 3074F SYST BP LT 130 MM HG: CPT | Performed by: FAMILY MEDICINE

## 2024-03-18 RX ORDER — ARIPIPRAZOLE 10 MG/1
TABLET ORAL
COMMUNITY
Start: 2024-03-13

## 2024-03-18 RX ORDER — MELOXICAM 15 MG/1
TABLET ORAL
COMMUNITY
Start: 2024-03-04 | End: 2024-03-18

## 2024-03-18 RX ORDER — HYDROXYZINE PAMOATE 50 MG/1
CAPSULE ORAL
COMMUNITY
Start: 2024-03-13

## 2024-03-18 RX ORDER — ALBUTEROL SULFATE 90 UG/1
AEROSOL, METERED RESPIRATORY (INHALATION)
COMMUNITY
Start: 2024-03-04 | End: 2024-03-18 | Stop reason: SDUPTHER

## 2024-03-18 RX ORDER — ALBUTEROL SULFATE 90 UG/1
2 AEROSOL, METERED RESPIRATORY (INHALATION) EVERY 6 HOURS PRN
Qty: 18 G | Refills: 3 | Status: SHIPPED | OUTPATIENT
Start: 2024-03-18

## 2024-03-18 RX ORDER — PANTOPRAZOLE SODIUM 40 MG/1
40 TABLET, DELAYED RELEASE ORAL DAILY
Qty: 90 TABLET | Refills: 1 | Status: SHIPPED | OUTPATIENT
Start: 2024-03-18

## 2024-03-18 RX ORDER — COVID-19 ANTIGEN TEST
2 KIT MISCELLANEOUS
Qty: 300 CAPSULE | Refills: 0 | Status: SHIPPED | OUTPATIENT
Start: 2024-03-18

## 2024-03-18 RX ORDER — AMLODIPINE BESYLATE 10 MG/1
TABLET ORAL
COMMUNITY
Start: 2024-03-04

## 2024-03-18 RX ORDER — BUDESONIDE AND FORMOTEROL FUMARATE DIHYDRATE 160; 4.5 UG/1; UG/1
2 AEROSOL RESPIRATORY (INHALATION) 2 TIMES DAILY
Qty: 10.2 G | Refills: 3 | Status: SHIPPED | OUTPATIENT
Start: 2024-03-18

## 2024-03-18 RX ORDER — PANTOPRAZOLE SODIUM 40 MG/1
TABLET, DELAYED RELEASE ORAL
COMMUNITY
Start: 2024-03-04 | End: 2024-03-18 | Stop reason: SDUPTHER

## 2024-03-18 RX ORDER — TRAZODONE HYDROCHLORIDE 50 MG/1
TABLET ORAL
COMMUNITY
Start: 2024-03-13

## 2024-03-18 RX ORDER — BUDESONIDE AND FORMOTEROL FUMARATE DIHYDRATE 160; 4.5 UG/1; UG/1
AEROSOL RESPIRATORY (INHALATION)
COMMUNITY
Start: 2024-03-04 | End: 2024-03-18 | Stop reason: SDUPTHER

## 2024-03-18 ASSESSMENT — ENCOUNTER SYMPTOMS
CONSTIPATION: 0
SHORTNESS OF BREATH: 0
VOMITING: 0
NAUSEA: 0
COUGH: 0
ABDOMINAL PAIN: 0
DIARRHEA: 0
WHEEZING: 0

## 2024-03-18 NOTE — PROGRESS NOTES
referral to orthopedic surgery based on patient results  -Also discussed conservative management  -Heat/ice  -Continue Aleve as needed  -Discussed possible referral to Physical therapy  -Can continue using Tiger balm  - XR HIP RIGHT (2-3 VIEWS); Future  - Naproxen Sodium 220 MG CAPS; Take 2 tablets by mouth every 4-6 hours as needed for Pain  Dispense: 300 capsule; Refill: 0    7. Chronic right shoulder pain  -We discussed possibility of intra-articular joint injection versus referral to orthopedic surgery based on patient results  -Also discussed conservative management  -Heat/ice  -Continue Aleve as needed  -Discussed possible referral to Physical therapy  -Can continue using Tiger balm  - XR SHOULDER RIGHT (MIN 2 VIEWS); Future  - Naproxen Sodium 220 MG CAPS; Take 2 tablets by mouth every 4-6 hours as needed for Pain  Dispense: 300 capsule; Refill: 0      Educational materials and/or home exercises printed for patient's review and were included in patient instructions on his/her After Visit Summary and given to patient at the end of visit.        Counseled regarding above diagnosis, including possible risks and complications,  especially if left uncontrolled.     Counseled regarding the possible side effects, risks, benefits and alternatives to treatment; patient and/or guardian verbalizes understanding, agrees, feels comfortable with and wishes to proceed with above treatment plan.     Advised patient to call with any new medication issues, and read all Rx info from pharmacy to assure aware of all possible risks and side effects of medication before taking.     Reviewed age and gender appropriate health screening exams and vaccinations.  Advised patient regarding importance of keeping up with recommended health maintenance and to schedule as soon as possible if overdue, as this is important in assessing for undiagnosed pathology, especially cancer, as well as protecting against potentially harmful/life

## 2024-03-25 ENCOUNTER — TELEPHONE (OUTPATIENT)
Age: 59
End: 2024-03-25

## 2024-03-25 DIAGNOSIS — Z12.31 BREAST CANCER SCREENING BY MAMMOGRAM: Primary | ICD-10-CM

## 2024-03-26 RX ORDER — BENZTROPINE MESYLATE 0.5 MG/1
TABLET ORAL
COMMUNITY
Start: 2024-03-20

## 2024-03-26 RX ORDER — MELOXICAM 15 MG/1
TABLET ORAL
COMMUNITY
Start: 2024-03-25

## 2024-03-26 RX ORDER — CHOLECALCIFEROL (VITAMIN D3) 125 MCG
CAPSULE ORAL
COMMUNITY
Start: 2024-03-20

## 2024-03-27 ENCOUNTER — HOSPITAL ENCOUNTER (OUTPATIENT)
Age: 59
Discharge: HOME | End: 2024-03-29
Payer: MEDICAID

## 2024-03-27 ENCOUNTER — HOSPITAL ENCOUNTER (OUTPATIENT)
Dept: GENERAL RADIOLOGY | Age: 59
Discharge: HOME | End: 2024-03-29
Payer: MEDICAID

## 2024-03-27 DIAGNOSIS — G89.29 CHRONIC RIGHT SHOULDER PAIN: ICD-10-CM

## 2024-03-27 DIAGNOSIS — M25.551 RIGHT HIP PAIN: ICD-10-CM

## 2024-03-27 DIAGNOSIS — M25.511 CHRONIC RIGHT SHOULDER PAIN: ICD-10-CM

## 2024-03-27 PROCEDURE — 73502 X-RAY EXAM HIP UNI 2-3 VIEWS: CPT

## 2024-03-27 PROCEDURE — 73030 X-RAY EXAM OF SHOULDER: CPT

## 2024-04-02 ENCOUNTER — OFFICE VISIT (OUTPATIENT)
Age: 59
End: 2024-04-02
Payer: MEDICAID

## 2024-04-02 VITALS
WEIGHT: 197.9 LBS | SYSTOLIC BLOOD PRESSURE: 124 MMHG | DIASTOLIC BLOOD PRESSURE: 82 MMHG | OXYGEN SATURATION: 98 % | BODY MASS INDEX: 28.33 KG/M2 | HEIGHT: 70 IN | RESPIRATION RATE: 16 BRPM | TEMPERATURE: 97.2 F | HEART RATE: 85 BPM

## 2024-04-02 DIAGNOSIS — M25.551 RIGHT HIP PAIN: Primary | ICD-10-CM

## 2024-04-02 DIAGNOSIS — M25.511 CHRONIC RIGHT SHOULDER PAIN: ICD-10-CM

## 2024-04-02 DIAGNOSIS — N76.0 ACUTE VAGINITIS: ICD-10-CM

## 2024-04-02 DIAGNOSIS — Z01.419 WELL WOMAN EXAM WITH ROUTINE GYNECOLOGICAL EXAM: ICD-10-CM

## 2024-04-02 DIAGNOSIS — B96.89 BV (BACTERIAL VAGINOSIS): ICD-10-CM

## 2024-04-02 DIAGNOSIS — N76.0 BV (BACTERIAL VAGINOSIS): ICD-10-CM

## 2024-04-02 DIAGNOSIS — R30.0 DYSURIA: ICD-10-CM

## 2024-04-02 DIAGNOSIS — G89.29 CHRONIC RIGHT SHOULDER PAIN: ICD-10-CM

## 2024-04-02 DIAGNOSIS — E55.9 VITAMIN D DEFICIENCY: ICD-10-CM

## 2024-04-02 LAB
BILIRUBIN, POC: NEGATIVE
BLOOD URINE, POC: ABNORMAL
CLARITY, POC: CLEAR
COLOR, POC: YELLOW
GLUCOSE URINE, POC: NEGATIVE
KETONES, POC: NEGATIVE
LEUKOCYTE EST, POC: ABNORMAL
NITRITE, POC: ABNORMAL
PH, POC: 7
PROTEIN, POC: NEGATIVE
SPECIFIC GRAVITY, POC: 1.02
UROBILINOGEN, POC: 0.2

## 2024-04-02 PROCEDURE — G8419 CALC BMI OUT NRM PARAM NOF/U: HCPCS | Performed by: FAMILY MEDICINE

## 2024-04-02 PROCEDURE — 81002 URINALYSIS NONAUTO W/O SCOPE: CPT | Performed by: FAMILY MEDICINE

## 2024-04-02 PROCEDURE — 3074F SYST BP LT 130 MM HG: CPT | Performed by: FAMILY MEDICINE

## 2024-04-02 PROCEDURE — 3017F COLORECTAL CA SCREEN DOC REV: CPT | Performed by: FAMILY MEDICINE

## 2024-04-02 PROCEDURE — 4004F PT TOBACCO SCREEN RCVD TLK: CPT | Performed by: FAMILY MEDICINE

## 2024-04-02 PROCEDURE — G8427 DOCREV CUR MEDS BY ELIG CLIN: HCPCS | Performed by: FAMILY MEDICINE

## 2024-04-02 PROCEDURE — 3079F DIAST BP 80-89 MM HG: CPT | Performed by: FAMILY MEDICINE

## 2024-04-02 PROCEDURE — G0101 CA SCREEN;PELVIC/BREAST EXAM: HCPCS | Performed by: FAMILY MEDICINE

## 2024-04-02 PROCEDURE — 99214 OFFICE O/P EST MOD 30 MIN: CPT | Performed by: FAMILY MEDICINE

## 2024-04-02 PROCEDURE — 87210 SMEAR WET MOUNT SALINE/INK: CPT | Performed by: FAMILY MEDICINE

## 2024-04-02 RX ORDER — ARIPIPRAZOLE 15 MG/1
TABLET ORAL
COMMUNITY
Start: 2024-03-20

## 2024-04-02 RX ORDER — METRONIDAZOLE 500 MG/1
500 TABLET ORAL 2 TIMES DAILY
Qty: 14 TABLET | Refills: 0 | Status: SHIPPED | OUTPATIENT
Start: 2024-04-02 | End: 2024-04-09

## 2024-04-02 ASSESSMENT — ENCOUNTER SYMPTOMS
ABDOMINAL PAIN: 0
COUGH: 0
VOMITING: 0
WHEEZING: 0
CONSTIPATION: 0
DIARRHEA: 0
SHORTNESS OF BREATH: 0
NAUSEA: 0
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
## 2024-04-03 DIAGNOSIS — N76.0 ACUTE VAGINITIS: ICD-10-CM

## 2024-04-03 DIAGNOSIS — N73.9 PELVIC INFLAMMATORY DISEASE DUE TO MYCOPLASMA HOMINIS: Primary | ICD-10-CM

## 2024-04-03 DIAGNOSIS — B96.89 PELVIC INFLAMMATORY DISEASE DUE TO MYCOPLASMA HOMINIS: Primary | ICD-10-CM

## 2024-04-03 DIAGNOSIS — A49.3 NONGONOCOCCAL URETHRITIS DUE TO UREAPLASMA UREALYTICUM: ICD-10-CM

## 2024-04-03 DIAGNOSIS — N34.1 NONGONOCOCCAL URETHRITIS DUE TO UREAPLASMA UREALYTICUM: ICD-10-CM

## 2024-04-03 RX ORDER — MOXIFLOXACIN HYDROCHLORIDE 400 MG/1
400 TABLET ORAL DAILY
Qty: 5 TABLET | Refills: 0 | Status: SHIPPED | OUTPATIENT
Start: 2024-04-03 | End: 2024-04-08

## 2024-04-04 LAB
CULTURE: ABNORMAL
SPECIMEN DESCRIPTION: ABNORMAL

## 2024-04-04 RX ORDER — ERGOCALCIFEROL 1.25 MG/1
50000 CAPSULE ORAL WEEKLY
Qty: 12 CAPSULE | Refills: 1 | Status: SHIPPED | OUTPATIENT
Start: 2024-04-04

## 2024-04-06 LAB
HPV SAMPLE: NORMAL
HPV SOURCE: NORMAL
HPV, GENOTYPE 16: NOT DETECTED
HPV, GENOTYPE 18: NOT DETECTED
HPV, HIGH RISK OTHER: NOT DETECTED
HPV, INTERPRETATION: NORMAL

## 2024-04-08 LAB
CHLAMYDIA BY THIN PREP: NEGATIVE
GYNECOLOGY CYTOLOGY REPORT: NORMAL
N. GONORRHOEAE DNA, THIN PREP: NEGATIVE

## 2024-04-11 RX ORDER — ATORVASTATIN CALCIUM 40 MG/1
40 TABLET, FILM COATED ORAL DAILY
Qty: 90 TABLET | Refills: 1 | Status: SHIPPED | OUTPATIENT
Start: 2024-04-11

## 2024-04-12 ENCOUNTER — HOSPITAL ENCOUNTER (OUTPATIENT)
Dept: PHYSICAL THERAPY | Age: 59
Setting detail: THERAPIES SERIES
Discharge: HOME | End: 2024-04-12
Payer: MEDICAID

## 2024-04-12 PROCEDURE — 97161 PT EVAL LOW COMPLEX 20 MIN: CPT

## 2024-04-12 NOTE — PROGRESS NOTES
[facility]                Phone: [phone]   Fax: [phone]    Physical Therapy Daily Treatment Note  Date:  2024    Patient Name:  Kathe Caldera    :  1965  MRN: [id number]    Restrictions/Precautions:    Diagnosis:  RT hip pain  Referring Physician:  Laila Naranjo MD    Visit# / total visits:  -   Time In:  Time Out:    Subjective:      Exercises:  Exercise/Equipment Resistance/Repetitions Other comments                                                                                                                                             Other Therapeutic Activities:      Home Exercise Program:      Manual Treatments:      Modalities:      Treatment/Activity Tolerance:  [] Patient tolerated treatment well [] Patient limited by fatique  [] Patient limited by pain  [] Patient limited by other medical complications  [] Other:     Plan:   [] Continue per plan of care [] Alter current plan (see comments)  [] Plan of care initiated [] Hold pending MD visit [] Discharge      Electronically signed by:  Azeem Grant PT

## 2024-04-12 NOTE — PROGRESS NOTES
[facility]                Phone: [phone]   Fax: [phone]    Physical Therapy  Out Patient Initial Evaluation    Date:  2024    Patient Name:  Kathe Caldera    :  1965  MRN: [id number]    DIAGNOSIS:  RT Hip Pain  EVALUATION DATE:  24  REFERRING PHYSICIAN:  Laila Naranjo MD  ONSET DATE:  3 mo     PROBLEMS FOUND DURING EVALUATION  Constant pain in RT hip 8/10 limiting standing, walking and ADL's.  Decreased AROM RT hip in flexion,abd 2/2 pain  Antalgic gait mobilizing with SBQC with slow step to gait cycle, high mid stance antalgic loading.    Dec MMT RT hip in flex, abd 3+-4/4    SHORT TERM GOALS (2-3 wks)  Imp Pain RT hip by 25% improving walking tommie 50%  Imp AROM RT hip in flex/abd/IR/ER   Imp MMT RT hip 4-5/5  Imp gait with WW with imp dynamic standing balances   Indep HEP    LONG TERM GOALS (3-4 wks)  Amb with WW with gain in standing/walking tolerances 30' or >  MMT RT hip 5/5 all planes  AROM RT hip     PATIENT GOALS  To get rid of pain and walk better.     REHAB POTENTIAL:  Fair based on X-ray RT hip     RECOMMENDED TREATMENT PLAN TO ACHIEVE THE MUTUAL LONG TERM GOALS:  Progressive ROM/MMT, gait train, functional ex, inst HEP    FREQUENCY/DURATION:  2x/week x 3-4 wks      Thank you for the opportunity to work with your patient. If you have questions or comments, please feel free to contact me by phone or fax.      Electronically Signed by Azeem Grant PT  2024        ___________________________________  2024    Physician     Date

## 2024-04-17 ENCOUNTER — HOSPITAL ENCOUNTER (OUTPATIENT)
Dept: PHYSICAL THERAPY | Age: 59
Setting detail: THERAPIES SERIES
Discharge: HOME | End: 2024-04-17
Payer: MEDICAID

## 2024-04-17 PROCEDURE — 97110 THERAPEUTIC EXERCISES: CPT

## 2024-04-17 NOTE — PROGRESS NOTES
[facility]                Phone: [phone]   Fax: [phone]    Physical Therapy Daily Treatment Note  Date:  2024    Patient Name:  Kathe Caldera    :  1965  MRN: [id number]    Restrictions/Precautions: General,    Diagnosis:  RT hip pain  Referring Physician:  Laila Naranjo MD    Visit# / total visits:  -8   Time In: 9:04  Time Out: 9:40    Subjective:  Reports pain as first visit.  Called and scheduled Ortho visit Dr Farnsworth for May 7th.      Exercises:  Exercise/Equipment Resistance/Repetitions Other comments    Onestep Seat #9 x 10' Moderate diff with RT foot place 2/2 high pain and dec pain free ROM hip to advance on/off. Had imp ROM hip as time advanced with reports feeling a bit better/looser as time advanced.     Heel slides RT  4 x 10 with 2-3 sec flexion end range holds Inc pain in hip but tolerable.  WNL flexion hip ROM     Supine hip abd  3 x 10 RT hip Limited RT hip Abd AROM, pain, OA hip preventing ROM    SLR RT  3 x 10 RT Able to SLR w/o much diff but does have inc pain RT hip.     Clam shell RT        LAQ RT      Supine ball squeezes 3 x 10 with a 5 sec squeeze hold Great B/L hip adductor MMT, no real inc hip pain RT.    Supine hip abd with TB   BL @ 3 x 10     Ball seated trunk/hip flex ROM    X 10 with 10 sec flexion holds Fair hip/trunk ROM                                                                              Other Therapeutic Activities:      Home Exercise Program: Provided     Manual Treatments:  NA    Modalities:  NA    Treatment/Activity Tolerance:  [] Patient tolerated treatment well [] Patient limited by fatique  [x] Patient limited by pain  [] Patient limited by other medical complications  [] Other:     Plan:   [x] Continue per plan of care [] Alter current plan (see comments)  [] Plan of care initiated [] Hold pending MD visit [] Discharge      Electronically signed by:  Azeem Grant PT

## 2024-04-19 ENCOUNTER — HOSPITAL ENCOUNTER (OUTPATIENT)
Dept: PHYSICAL THERAPY | Age: 59
Setting detail: THERAPIES SERIES
Discharge: HOME | End: 2024-04-19
Payer: MEDICAID

## 2024-04-24 ENCOUNTER — HOSPITAL ENCOUNTER (OUTPATIENT)
Dept: PHYSICAL THERAPY | Age: 59
Setting detail: THERAPIES SERIES
Discharge: HOME | End: 2024-04-24
Payer: MEDICAID

## 2024-04-24 PROCEDURE — 97110 THERAPEUTIC EXERCISES: CPT

## 2024-04-24 NOTE — PROGRESS NOTES
[facility]                Phone: [phone]   Fax: [phone]    Physical Therapy Daily Treatment Note  Date:  2024    Patient Name:  Kathe Caldera    :  1965  MRN: [id number]    Restrictions/Precautions: General,    Diagnosis:  RT hip pain  Referring Physician:  Laila Naranjo MD    Visit# / total visits:  3/6-8   Time In: 8:42  Time Out: 9:22    Subjective:  Reports pain as first visit.  Called and scheduled Ortho visit Dr Farnsworth for May 7th.      O: There-ex per grid  Amb with QC with severe antalgic gait cycle RTLE.  Holds QC in front of her and did not yet get the WW as needed.    A/P: See grid comments.  Severe antalgic gait with QC in front of her.         Exercises:  Exercise/Equipment Resistance/Repetitions Other comments    Onestep Seat #9 x 10' Moderate diff with RT foot place 2/2 high pain and dec pain free ROM hip to advance on/off. Had imp ROM/pain hip as time advanced with reports feeling a bit better/looser as time advanced.     Heel slides RT with board 4 x 10 with 2-3 sec flexion end range holds Inc pain in hip but tolerable.  WNL flexion hip ROM     Supine hip abd AROM with board 4 x 10 RT hip Limited RT hip Abd AROM, pain, OA hip preventing ROM    SLR RT  3 x 10 RT Able to SLR w/o much diff but does have inc pain RT hip.     Clam shell RT        LAQ RT  3 x 10 RT     Supine ball squeezes 3 x 10 with a 5 sec squeeze hold Great B/L hip adductor MMT, no real inc hip pain RT.    Supine hip abd with TB   BL @ 4 x 10     Ball seated trunk/hip flex ROM    X 10 with 15 sec flexion holds Fair hip/trunk ROM   Seated hip flexion RT      2 x 10  Needs to lean back to fully flex hip, pain, dec AROM   Bridges     2 x 10 Reports not much inc pain, good bridge                                                              Other Therapeutic Activities:      Home Exercise Program: Provided     Manual Treatments:  NA    Modalities:

## 2024-04-26 ENCOUNTER — APPOINTMENT (OUTPATIENT)
Dept: PHYSICAL THERAPY | Age: 59
End: 2024-04-26
Payer: MEDICAID

## 2024-05-01 ENCOUNTER — HOSPITAL ENCOUNTER (OUTPATIENT)
Dept: PHYSICAL THERAPY | Age: 59
Setting detail: THERAPIES SERIES
Discharge: HOME | End: 2024-05-01
Payer: MEDICAID

## 2024-05-01 PROCEDURE — 97110 THERAPEUTIC EXERCISES: CPT

## 2024-05-01 NOTE — PROGRESS NOTES
[facility]                Phone: [phone]   Fax: [phone]    Physical Therapy Daily Treatment Note  Date:  2024    Patient Name:  Kathe Caldera    :  1965  MRN: [id number]    Restrictions/Precautions: General,    Diagnosis:  RT hip pain  Referring Physician:  Laila Naranjo MD    Visit# / total visits:  -8   Time In: 8:45  Time Out: 9:30    Subjective:  24: Still having severe RT hip pain and unable to walk w/o AD.  Has the QC but cannot afford a WW.  To see Dr Tirado may 7th for RT hip; will   schedule a hip Sx ASAP if Dr recommends it.     Reports pain as first visit.  Called and scheduled Ortho visit Dr Farnsworth for May 7th.      O: There-ex per grid  Amb with QC with severe antalgic gait cycle RTLE.  Holds QC in front of her and did not yet get the WW as needed.    A/P: See grid comments.  Severe antalgic gait with QC in front of her.  Marked weakness RT hip. Inc pain with all AROM RT hip.          Exercises:  Exercise/Equipment Resistance/Repetitions Other comments    Onestep Seat #9 x 10' Moderate diff with RT foot place 2/2 high pain and dec pain free ROM hip to advance on/off. Had imp ROM/pain hip as time advanced with reports feeling a bit better/looser as time advanced.     Heel slides RT with board 4 x 10 with 2-3 sec flexion end range holds Inc pain in hip but tolerable.  WNL flexion hip ROM    Supine hip abd AROM with board 4 x 10 RT hip Limited RT hip Abd AROM, pain, OA hip preventing ROM    SLR RT  3 x 10 RT Able to SLR w/o much diff but does have inc pain RT hip.     Clam shell RT        LAQ RT  4 x 10 RT  No diff, no radicular pain RTLE, full knee Ext    Supine ball squeezes 3 x 10 with a 5 sec squeeze hold Great B/L hip adductor MMT, no real inc hip pain RT.    Supine hip abd with TB   Purple @ 4 x 10  Good MT B/L with symmetrical hip abd ROM, inc pain a \"bit\" RT hip.    Ball seated trunk/hip flex ROM    X 10 with 15 sec

## 2024-05-02 DIAGNOSIS — M25.551 RIGHT HIP PAIN: ICD-10-CM

## 2024-05-02 DIAGNOSIS — G89.29 CHRONIC RIGHT SHOULDER PAIN: ICD-10-CM

## 2024-05-02 DIAGNOSIS — M25.511 CHRONIC RIGHT SHOULDER PAIN: ICD-10-CM

## 2024-05-02 RX ORDER — COVID-19 ANTIGEN TEST
2 KIT MISCELLANEOUS
Qty: 300 CAPSULE | Refills: 0 | Status: SHIPPED | OUTPATIENT
Start: 2024-05-02

## 2024-05-03 ENCOUNTER — HOSPITAL ENCOUNTER (OUTPATIENT)
Dept: PHYSICAL THERAPY | Age: 59
Setting detail: THERAPIES SERIES
Discharge: HOME | End: 2024-05-03
Payer: MEDICAID

## 2024-05-03 NOTE — PROGRESS NOTES
[facility]                Phone: [phone]  Fax: [phone]    Physical Therapy  Cancellation/No-show Note  Patient Name:  Kathe Caldera  :  1965   Date:  5/3/2024    For today's appointment patient:  []  Cancelled  []  Rescheduled appointment  [x]  No-show     Reason given by patient:  []  Patient ill  []  Conflicting appointment  []  No transportation    []  Conflict with work  []  No reason given  []  Other:     Comments:      Electronically signed by:  Azeem Grant, PT

## 2024-05-06 ENCOUNTER — HOSPITAL ENCOUNTER (OUTPATIENT)
Dept: PHYSICAL THERAPY | Age: 59
Setting detail: THERAPIES SERIES
End: 2024-05-06
Payer: MEDICAID

## 2024-05-08 ENCOUNTER — HOSPITAL ENCOUNTER (OUTPATIENT)
Dept: PHYSICAL THERAPY | Age: 59
Setting detail: THERAPIES SERIES
Discharge: HOME | End: 2024-05-08
Payer: MEDICAID

## 2024-05-22 ENCOUNTER — TELEPHONE (OUTPATIENT)
Dept: FAMILY MEDICINE CLINIC | Age: 59
End: 2024-05-22

## 2024-05-22 NOTE — TELEPHONE ENCOUNTER
----- Message from Princess Jovi Muñiz sent at 5/22/2024  1:27 PM EDT -----  Regarding: [facility] Referral Request  [facility] Referral Request    Reason for referral request: Specialty Provider    Specialist/Lab/Test patient is requesting (if known): cardiologist referral request    Specialist Phone Number (if applicable): [phone]    Additional Information :   --------------------------------------------------------------------------------------------------------------------------    Relationship to Patient: Covered Entity    Call Back Information: OK to leave message on voicemail  Preferred Call Back Number: [phone]

## 2024-05-30 RX ORDER — ATORVASTATIN CALCIUM 40 MG/1
40 TABLET, FILM COATED ORAL DAILY
Qty: 90 TABLET | Refills: 1 | Status: SHIPPED | OUTPATIENT
Start: 2024-05-30

## 2024-06-06 NOTE — H&P
[place] [address]                           HISTORY & PHYSICAL      PATIENT NAME: GABRIEL ROSENTHAL          : 1965  MED REC NO: [id number]                        ROOM:   ACCOUNT NO: [id number]                       ADMIT DATE: 2024  PROVIDER: Dr. Tirado      PRIMARY CARE PHYSICIAN:  Dr. Jason Proctor    DATE OF SURGERY:  2024.    CHIEF COMPLAINT:  Right hip pain.    HISTORY OF PRESENT ILLNESS:  This is a 58-year-old female with chronic right hip pain secondary to osteoarthritis.  She has failed conservative treatments with anti-inflammatories, analgesics, and injections.  She is now scheduled for right total hip arthroplasty.    PAST MEDICAL HISTORY:  Depression, anxiety, hypertension, GERD, and history of pericarditis.    PRIOR SURGERIES:  Unremarkable.    CURRENT MEDICATIONS:  Naprosyn, Symbicort, pantoprazole, aripiprazole, melatonin, Ventolin, trazodone, divalproex, amlodipine, meloxicam, and benztropine.    ALLERGIES:  NONE.      FAMILY HISTORY:  Unremarkable.    SOCIAL HISTORY:  Admits social alcohol consumption and prior tobacco use.    REVIEW OF SYSTEMS:  ALLERGIES:  As stated above.  SKIN AND LYMPHATICS:  Denies rashes or lesions.  CNS:  No prior CVAs.  RESPIRATORY:  No cough or shortness of breath.  CIRCULATORY:  No prior MIs.  DIGESTIVE:  History of GERD.  GENITOURINARY:  No dysuria.  METABOLIC AND ENDOCRINE:  No thyroid disease or diabetes.  HEMATOLOGIC:  No anemia.  MUSCULOSKELETAL:  Positive OA.  PSYCHIATRIC:  History of anxiety and depression.    PHYSICAL EXAMINATION:  GENERAL APPEARANCE:  Well-nourished, well-appearing 58-year-old female, in no acute distress, alert and oriented x3.  SKIN:  Without masses, rashes, or lesions.  LYMPH NODES:  No adenopathy.  HEAD:  Normocephalic and atraumatic.  EARS:  Clear and functional.  EYES:  Fundi, PERRLA.  NOSE:  Clear without deviation.  MOUTH:

## 2024-06-07 ENCOUNTER — TELEPHONE (OUTPATIENT)
Age: 59
End: 2024-06-07

## 2024-06-07 NOTE — TELEPHONE ENCOUNTER
Patient need to Dr. Jason Proctor call to this number [phone] and talk with Emaly she want to ask Dr. Gomez about a referral.Thanks

## 2024-06-13 ENCOUNTER — TELEPHONE (OUTPATIENT)
Age: 59
End: 2024-06-13

## 2024-06-13 NOTE — TELEPHONE ENCOUNTER
Send message back to me so I can call patient back at [phone] need referral send to [facility]. Thank You :)

## 2024-06-20 ENCOUNTER — OFFICE VISIT (OUTPATIENT)
Age: 59
End: 2024-06-20

## 2024-06-20 VITALS
HEIGHT: 70 IN | OXYGEN SATURATION: 97 % | HEART RATE: 86 BPM | WEIGHT: 236.1 LBS | BODY MASS INDEX: 33.8 KG/M2 | DIASTOLIC BLOOD PRESSURE: 72 MMHG | SYSTOLIC BLOOD PRESSURE: 116 MMHG | RESPIRATION RATE: 16 BRPM | TEMPERATURE: 97.4 F

## 2024-06-20 DIAGNOSIS — Z01.818 PRE-OP EXAM: ICD-10-CM

## 2024-06-20 DIAGNOSIS — F17.200 TOBACCO DEPENDENCE: ICD-10-CM

## 2024-06-20 DIAGNOSIS — M16.11 PRIMARY OSTEOARTHRITIS OF RIGHT HIP: Primary | ICD-10-CM

## 2024-06-20 LAB
ALBUMIN: 4.2 G/DL (ref 3.5–5.2)
ALP BLD-CCNC: 90 U/L (ref 35–104)
ALT SERPL-CCNC: 7 U/L (ref 0–32)
ANION GAP SERPL CALCULATED.3IONS-SCNC: 9 MMOL/L (ref 7–16)
AST SERPL-CCNC: 16 U/L (ref 0–31)
BASOPHILS ABSOLUTE: 0.02 K/UL (ref 0–0.2)
BASOPHILS RELATIVE PERCENT: 0 % (ref 0–2)
BILIRUB SERPL-MCNC: 0.2 MG/DL (ref 0–1.2)
BUN BLDV-MCNC: 16 MG/DL (ref 6–20)
CALCIUM SERPL-MCNC: 9.2 MG/DL (ref 8.6–10.2)
CHLORIDE BLD-SCNC: 98 MMOL/L (ref 98–107)
CO2: 28 MMOL/L (ref 22–29)
CREAT SERPL-MCNC: 1.1 MG/DL (ref 0.5–1)
EOSINOPHILS ABSOLUTE: 0.25 K/UL (ref 0.05–0.5)
EOSINOPHILS RELATIVE PERCENT: 3 % (ref 0–6)
GFR, ESTIMATED: 62 ML/MIN/1.73M2
GLUCOSE BLD-MCNC: 114 MG/DL (ref 74–99)
HCT VFR BLD CALC: 38.8 % (ref 34–48)
HEMOGLOBIN: 11.8 G/DL (ref 11.5–15.5)
IMMATURE GRANULOCYTES %: 0 % (ref 0–5)
IMMATURE GRANULOCYTES ABSOLUTE: 0.03 K/UL (ref 0–0.58)
LYMPHOCYTES ABSOLUTE: 2.68 K/UL (ref 1.5–4)
LYMPHOCYTES RELATIVE PERCENT: 35 % (ref 20–42)
MCH RBC QN AUTO: 25.6 PG (ref 26–35)
MCHC RBC AUTO-ENTMCNC: 30.4 G/DL (ref 32–34.5)
MCV RBC AUTO: 84.2 FL (ref 80–99.9)
MONOCYTES ABSOLUTE: 0.53 K/UL (ref 0.1–0.95)
MONOCYTES RELATIVE PERCENT: 7 % (ref 2–12)
NEUTROPHILS ABSOLUTE: 4.1 K/UL (ref 1.8–7.3)
NEUTROPHILS RELATIVE PERCENT: 54 % (ref 43–80)
PDW BLD-RTO: 15.4 % (ref 11.5–15)
PLATELET # BLD: 320 K/UL (ref 130–450)
PMV BLD AUTO: 10.5 FL (ref 7–12)
POTASSIUM SERPL-SCNC: 5.5 MMOL/L (ref 3.5–5)
RBC # BLD: 4.61 M/UL (ref 3.5–5.5)
SODIUM BLD-SCNC: 135 MMOL/L (ref 132–146)
TOTAL PROTEIN: 7.8 G/DL (ref 6.4–8.3)
WBC # BLD: 7.6 K/UL (ref 4.5–11.5)

## 2024-06-20 RX ORDER — BUSPIRONE HYDROCHLORIDE 5 MG/1
TABLET ORAL
COMMUNITY
Start: 2024-06-14

## 2024-06-20 RX ORDER — NICOTINE 21 MG/24HR
1 PATCH, TRANSDERMAL 24 HOURS TRANSDERMAL DAILY
Qty: 42 PATCH | Refills: 0 | Status: SHIPPED | OUTPATIENT
Start: 2024-06-20 | End: 2024-08-01

## 2024-06-20 NOTE — PROGRESS NOTES
CC: Pre-op Exam (Dr. Arriaga on 6/28/24 in [facility].)      HPI:  Kathe Caldera is a 58 y.o. yo female presents for a preoperative consultation at the request of surgeon, Dr. Tirado , who plans on performing   ARABELLA Rt sideon 6/28/24 at [facility].  The current problem began years ago, and symptoms have been worsening with time.  Conservative therapy: Yes: PT, ICS, which has been ineffective..    Pre-Operative Risk assessment using 2014 ACC/AHA guidelines for non-cardiac surgery   Emergent procedure: No    Active Cardiac Condition: No   Risk Level of Procedure:   [] Low (0-1% of adverse cardiac events): superficial procedures, cataract/ breast surgery, endoscopy, superficial skin, ambulatory procedures.  [x] Intermediate (1-5%): carotid endarterectomy, intraperitoneal/intrathoracic surgery, orthopedic surgery, head and neck surgery, and prostate surgery.   [] High (> 5%): vascular or aortic repair surgery.  Measurement of Exercise Tolerance before Surgery >4 without symptoms: No. Pt can Walk indoors, such as around the house (1.75 METs), Do light work around the house, such as dusting or washing dishes (2.70 METs), Take care of self, that is eating, dressing, bathing, using the toilet (2.75 METs), Walk a block or two on level ground (2.75 METs), and Climb a flight of stairs or walk up a hill (5.50 METs).   Assessment of Risk using RCRI (Revised Cardiac Risk Index) factors:   [] High-risk surgery   [] Ischemic heart disease   [] Hx of cerebrovascular disease  [] Hx of Heart failure  [] DM requiring insulin   [] Preop Cr > 2.0 mg/dL    Other items of interest:  Planned anesthesia: Spinal and General; Known anesthesia problems: None   Bleeding risk: No recent or remote history of abnormal bleeding  Personal or FH of DVT/PE: No    Patient objection to receiving blood products: No  Planned overnight hospital stay: Yes - overnight   Hx of stent placement and need for continued DAPT or antiplatelet

## 2024-06-21 ENCOUNTER — TELEPHONE (OUTPATIENT)
Age: 59
End: 2024-06-21

## 2024-06-21 ENCOUNTER — HOSPITAL ENCOUNTER (OUTPATIENT)
Dept: PREADMISSION TESTING | Age: 59
Discharge: HOME | End: 2024-06-21
Payer: MEDICAID

## 2024-06-21 ENCOUNTER — HOSPITAL ENCOUNTER (OUTPATIENT)
Age: 59
Discharge: HOME | End: 2024-06-21
Payer: MEDICAID

## 2024-06-21 VITALS
DIASTOLIC BLOOD PRESSURE: 73 MMHG | OXYGEN SATURATION: 98 % | WEIGHT: 230 LBS | BODY MASS INDEX: 32.93 KG/M2 | HEIGHT: 70 IN | RESPIRATION RATE: 18 BRPM | TEMPERATURE: 97.1 F | HEART RATE: 74 BPM | SYSTOLIC BLOOD PRESSURE: 128 MMHG

## 2024-06-21 DIAGNOSIS — M16.11 PRIMARY OSTEOARTHRITIS OF RIGHT HIP: ICD-10-CM

## 2024-06-21 DIAGNOSIS — E87.5 HYPERKALEMIA: ICD-10-CM

## 2024-06-21 DIAGNOSIS — E87.5 HYPERKALEMIA: Primary | ICD-10-CM

## 2024-06-21 LAB
ANION GAP SERPL CALCULATED.3IONS-SCNC: 11 MMOL/L (ref 7–16)
BUN SERPL-MCNC: 18 MG/DL (ref 6–20)
CALCIUM SERPL-MCNC: 9.1 MG/DL (ref 8.6–10.2)
CHLORIDE SERPL-SCNC: 102 MMOL/L (ref 98–107)
CO2 SERPL-SCNC: 25 MMOL/L (ref 22–29)
CREAT SERPL-MCNC: 1.1 MG/DL (ref 0.5–1)
GFR, ESTIMATED: 62 ML/MIN/1.73M2
GLUCOSE SERPL-MCNC: 114 MG/DL (ref 74–99)
POTASSIUM SERPL-SCNC: 4.7 MMOL/L (ref 3.5–5)
SODIUM SERPL-SCNC: 138 MMOL/L (ref 132–146)

## 2024-06-21 PROCEDURE — 36415 COLL VENOUS BLD VENIPUNCTURE: CPT

## 2024-06-21 PROCEDURE — 80048 BASIC METABOLIC PNL TOTAL CA: CPT

## 2024-06-21 PROCEDURE — 87081 CULTURE SCREEN ONLY: CPT

## 2024-06-21 ASSESSMENT — PAIN DESCRIPTION - LOCATION: LOCATION: HIP

## 2024-06-21 ASSESSMENT — HOOS JR
HOOS JR RAW SCORE: 24
GOING UP OR DOWN STAIRS: EXTREME
BENDING TO THE FLOOR TO PICK UP OBJECT: EXTREME
HOOS JR TOTAL INTERVAL SCORE: 0
HOOS JR RAW SCORE: 24
RISING FROM SITTING: EXTREME
SITTING: EXTREME
WALKING ON UNEVEN SURFACE: EXTREME
LYING IN BED (TURNING OVER, MAINTAINING HIP POSITION): EXTREME

## 2024-06-21 ASSESSMENT — PAIN DESCRIPTION - FREQUENCY: FREQUENCY: CONTINUOUS

## 2024-06-21 ASSESSMENT — PAIN DESCRIPTION - ORIENTATION: ORIENTATION: RIGHT

## 2024-06-21 ASSESSMENT — PAIN DESCRIPTION - DESCRIPTORS: DESCRIPTORS: SPASM;SHARP

## 2024-06-21 ASSESSMENT — PAIN - FUNCTIONAL ASSESSMENT: PAIN_FUNCTIONAL_ASSESSMENT: PREVENTS OR INTERFERES SOME ACTIVE ACTIVITIES AND ADLS

## 2024-06-21 ASSESSMENT — PAIN SCALES - GENERAL: PAINLEVEL_OUTOF10: 10

## 2024-06-21 ASSESSMENT — PAIN DESCRIPTION - DIRECTION: RADIATING_TOWARDS: GROIN AND BUTTOCK

## 2024-06-21 ASSESSMENT — PAIN DESCRIPTION - ONSET: ONSET: ON-GOING

## 2024-06-21 ASSESSMENT — PAIN DESCRIPTION - PAIN TYPE: TYPE: CHRONIC PAIN

## 2024-06-21 NOTE — TELEPHONE ENCOUNTER
Spoke to patient and advised her to go to lab and get her BMP redrawn per Doctors orders. Pre-admission called to notify us that her lab was back. Called them back and spoke to Leticia at [phone] and advised her that I would be letting patient know she needs redrawn.     Patient states she will be getting redrawn.

## 2024-06-21 NOTE — PROGRESS NOTES
[facility] PRE-ADMISSION TESTING INSTRUCTIONS    The Preadmission Testing patient is instructed accordingly using the following criteria (check applicable):    ARRIVAL INSTRUCTIONS:  [x] Parking the day of Surgery is located in the Main Entrance lot.  Upon entering the door, make an immediate right to the surgery reception desk    [x] Bring photo ID and insurance card    [x] Bring in a copy of Living will or Durable Power of  papers.    [x] Please be sure to arrange for responsible adult to provide transportation to and from the hospital    [x] Please arrange for responsible adult to be with you for the 24 hour period post procedure due to having anesthesia    [x] If you awake am of surgery not feeling well or have temperature >100 please call [phone]    GENERAL INSTRUCTIONS:    [x] Follow instructions for hydration that have been provided to you at your Pre-Admission Visit. Solid food until midnight then clear liquids. No gum, candy or mints.    [x] You may brush your teeth, but do not swallow any water    [x] Take medications as instructed with 1-2 oz of water    [x] Stop herbal supplements and vitamins 5 days prior to procedure    [] Follow preop dosing of blood thinners per physician instructions    [] Take 1/2 dose of evening insulin, but no insulin after midnight    [] No oral diabetic medications after midnight    [] If diabetic and have low blood sugar or feel symptomatic, take 1-2oz apple juice only    [x] Bring inhalers day of surgery    [] Bring C-PAP/ Bi-Pap day of surgery    [] Bring urine specimen day of surgery    [x] Shower or bath with soap, lather and rinse well, AM of Surgery, no lotion, powders or creams to surgical site    [] Follow bowel prep as instructed per surgeon    [x] No tobacco products within 24 hours of surgery     [x] No alcohol or illegal drug use within 24 hours of surgery.    [x] Jewelry, body piercing's, eyeglasses, contact lenses and

## 2024-06-21 NOTE — PROGRESS NOTES
Message left for Bre at Dr Corrales's office that BMP results were faxed to office due to potassium 5.5 from bloodwork drawn 6/20/24.  Dr Handy  notified potassium 5.5  and creatinine 1.1 from BMP drawn 6/20/24. Orders received to have BMP drawn on day of   surgery.

## 2024-06-22 LAB
MICROORGANISM SPEC CULT: NORMAL
SPECIMEN DESCRIPTION: NORMAL

## 2024-06-26 RX ORDER — ATORVASTATIN CALCIUM 40 MG/1
40 TABLET, FILM COATED ORAL DAILY
Qty: 90 TABLET | Refills: 1 | Status: SHIPPED | OUTPATIENT
Start: 2024-06-26

## 2024-06-27 ENCOUNTER — ANESTHESIA EVENT (OUTPATIENT)
Dept: OPERATING ROOM | Age: 59
End: 2024-06-27
Payer: MEDICAID

## 2024-06-28 ENCOUNTER — APPOINTMENT (OUTPATIENT)
Dept: GENERAL RADIOLOGY | Age: 59
End: 2024-06-28
Attending: ORTHOPAEDIC SURGERY
Payer: MEDICAID

## 2024-06-28 ENCOUNTER — ANESTHESIA (OUTPATIENT)
Dept: OPERATING ROOM | Age: 59
End: 2024-06-28
Payer: MEDICAID

## 2024-06-28 ENCOUNTER — HOSPITAL ENCOUNTER (OUTPATIENT)
Age: 59
Setting detail: OBSERVATION
Discharge: HOME HEALTH | End: 2024-06-29
Attending: ORTHOPAEDIC SURGERY | Admitting: ORTHOPAEDIC SURGERY
Payer: MEDICAID

## 2024-06-28 DIAGNOSIS — M16.11 PRIMARY OSTEOARTHRITIS OF RIGHT HIP: Primary | ICD-10-CM

## 2024-06-28 LAB
HCG, URINE, POC: NEGATIVE
Lab: NORMAL
NEGATIVE QC PASS/FAIL: NORMAL
POSITIVE QC PASS/FAIL: NORMAL

## 2024-06-28 PROCEDURE — 2500000003 HC RX 250 WO HCPCS: Performed by: ORTHOPAEDIC SURGERY

## 2024-06-28 PROCEDURE — G0378 HOSPITAL OBSERVATION PER HR: HCPCS

## 2024-06-28 PROCEDURE — 6360000002 HC RX W HCPCS: Performed by: NURSE ANESTHETIST, CERTIFIED REGISTERED

## 2024-06-28 PROCEDURE — 2709999900 HC NON-CHARGEABLE SUPPLY: Performed by: ORTHOPAEDIC SURGERY

## 2024-06-28 PROCEDURE — 6370000000 HC RX 637 (ALT 250 FOR IP): Performed by: PHYSICIAN ASSISTANT

## 2024-06-28 PROCEDURE — 6360000002 HC RX W HCPCS: Performed by: ORTHOPAEDIC SURGERY

## 2024-06-28 PROCEDURE — 72170 X-RAY EXAM OF PELVIS: CPT

## 2024-06-28 PROCEDURE — 2580000003 HC RX 258: Performed by: ORTHOPAEDIC SURGERY

## 2024-06-28 PROCEDURE — 7100000001 HC PACU RECOVERY - ADDTL 15 MIN: Performed by: ORTHOPAEDIC SURGERY

## 2024-06-28 PROCEDURE — 3600000005 HC SURGERY LEVEL 5 BASE: Performed by: ORTHOPAEDIC SURGERY

## 2024-06-28 PROCEDURE — 2580000003 HC RX 258: Performed by: PHYSICIAN ASSISTANT

## 2024-06-28 PROCEDURE — 3700000001 HC ADD 15 MINUTES (ANESTHESIA): Performed by: ORTHOPAEDIC SURGERY

## 2024-06-28 PROCEDURE — 6360000002 HC RX W HCPCS: Performed by: PHYSICIAN ASSISTANT

## 2024-06-28 PROCEDURE — 73501 X-RAY EXAM HIP UNI 1 VIEW: CPT

## 2024-06-28 PROCEDURE — 2500000003 HC RX 250 WO HCPCS: Performed by: NURSE ANESTHETIST, CERTIFIED REGISTERED

## 2024-06-28 PROCEDURE — 7100000000 HC PACU RECOVERY - FIRST 15 MIN: Performed by: ORTHOPAEDIC SURGERY

## 2024-06-28 PROCEDURE — 3700000000 HC ANESTHESIA ATTENDED CARE: Performed by: ORTHOPAEDIC SURGERY

## 2024-06-28 PROCEDURE — 3600000015 HC SURGERY LEVEL 5 ADDTL 15MIN: Performed by: ORTHOPAEDIC SURGERY

## 2024-06-28 PROCEDURE — 6360000002 HC RX W HCPCS: Performed by: ANESTHESIOLOGY

## 2024-06-28 PROCEDURE — 6370000000 HC RX 637 (ALT 250 FOR IP): Performed by: ORTHOPAEDIC SURGERY

## 2024-06-28 PROCEDURE — 2580000003 HC RX 258: Performed by: NURSE ANESTHETIST, CERTIFIED REGISTERED

## 2024-06-28 PROCEDURE — 2580000003 HC RX 258: Performed by: ANESTHESIOLOGY

## 2024-06-28 PROCEDURE — C1776 JOINT DEVICE (IMPLANTABLE): HCPCS | Performed by: ORTHOPAEDIC SURGERY

## 2024-06-28 PROCEDURE — 2500000003 HC RX 250 WO HCPCS: Performed by: PHYSICIAN ASSISTANT

## 2024-06-28 DEVICE — TRIDENT X3 0 DEGREE POLYETHYLENE INSERT
Type: IMPLANTABLE DEVICE | Site: HIP | Status: FUNCTIONAL
Brand: TRIDENT X3 INSERT

## 2024-06-28 DEVICE — CERAMIC V40 FEMORAL HEAD
Type: IMPLANTABLE DEVICE | Site: HIP | Status: FUNCTIONAL
Brand: BIOLOX

## 2024-06-28 DEVICE — TRIDENT II CLUSTERHOLE HA ACETABULAR SHELL 48D
Type: IMPLANTABLE DEVICE | Site: HIP | Status: FUNCTIONAL
Brand: TRIDENT II

## 2024-06-28 DEVICE — 132 DEGREE NECK ANGLE HIP STEM
Type: IMPLANTABLE DEVICE | Site: HIP | Status: FUNCTIONAL
Brand: ACCOLADE

## 2024-06-28 RX ORDER — CELECOXIB 100 MG/1
200 CAPSULE ORAL DAILY
Status: DISCONTINUED | OUTPATIENT
Start: 2024-06-29 | End: 2024-06-29 | Stop reason: HOSPADM

## 2024-06-28 RX ORDER — SODIUM CHLORIDE 0.9 % (FLUSH) 0.9 %
5-40 SYRINGE (ML) INJECTION EVERY 12 HOURS SCHEDULED
Status: DISCONTINUED | OUTPATIENT
Start: 2024-06-28 | End: 2024-06-29 | Stop reason: HOSPADM

## 2024-06-28 RX ORDER — SODIUM CHLORIDE 9 MG/ML
INJECTION, SOLUTION INTRAVENOUS PRN
Status: DISCONTINUED | OUTPATIENT
Start: 2024-06-28 | End: 2024-06-28 | Stop reason: HOSPADM

## 2024-06-28 RX ORDER — NALOXONE HYDROCHLORIDE 0.4 MG/ML
INJECTION, SOLUTION INTRAMUSCULAR; INTRAVENOUS; SUBCUTANEOUS PRN
Status: DISCONTINUED | OUTPATIENT
Start: 2024-06-28 | End: 2024-06-28 | Stop reason: HOSPADM

## 2024-06-28 RX ORDER — SODIUM CHLORIDE 9 MG/ML
INJECTION, SOLUTION INTRAVENOUS PRN
Status: DISCONTINUED | OUTPATIENT
Start: 2024-06-28 | End: 2024-06-29 | Stop reason: HOSPADM

## 2024-06-28 RX ORDER — TRANEXAMIC ACID 10 MG/ML
1000 INJECTION, SOLUTION INTRAVENOUS
Status: COMPLETED | OUTPATIENT
Start: 2024-06-28 | End: 2024-06-28

## 2024-06-28 RX ORDER — ASPIRIN 325 MG
325 TABLET ORAL DAILY
Status: DISCONTINUED | OUTPATIENT
Start: 2024-06-28 | End: 2024-06-29 | Stop reason: HOSPADM

## 2024-06-28 RX ORDER — SODIUM CHLORIDE 0.9 % (FLUSH) 0.9 %
5-40 SYRINGE (ML) INJECTION EVERY 12 HOURS SCHEDULED
Status: DISCONTINUED | OUTPATIENT
Start: 2024-06-28 | End: 2024-06-28 | Stop reason: HOSPADM

## 2024-06-28 RX ORDER — ONDANSETRON 2 MG/ML
4 INJECTION INTRAMUSCULAR; INTRAVENOUS EVERY 6 HOURS PRN
Status: DISCONTINUED | OUTPATIENT
Start: 2024-06-28 | End: 2024-06-29 | Stop reason: HOSPADM

## 2024-06-28 RX ORDER — NEOSTIGMINE METHYLSULFATE 1 MG/ML
INJECTION, SOLUTION INTRAVENOUS PRN
Status: DISCONTINUED | OUTPATIENT
Start: 2024-06-28 | End: 2024-06-28 | Stop reason: SDUPTHER

## 2024-06-28 RX ORDER — SODIUM CHLORIDE 0.9 % (FLUSH) 0.9 %
5-40 SYRINGE (ML) INJECTION PRN
Status: DISCONTINUED | OUTPATIENT
Start: 2024-06-28 | End: 2024-06-29 | Stop reason: HOSPADM

## 2024-06-28 RX ORDER — DEXAMETHASONE SODIUM PHOSPHATE 4 MG/ML
INJECTION, SOLUTION INTRA-ARTICULAR; INTRALESIONAL; INTRAMUSCULAR; INTRAVENOUS; SOFT TISSUE PRN
Status: DISCONTINUED | OUTPATIENT
Start: 2024-06-28 | End: 2024-06-28 | Stop reason: SDUPTHER

## 2024-06-28 RX ORDER — GLYCOPYRROLATE 0.2 MG/ML
INJECTION INTRAMUSCULAR; INTRAVENOUS PRN
Status: DISCONTINUED | OUTPATIENT
Start: 2024-06-28 | End: 2024-06-28 | Stop reason: SDUPTHER

## 2024-06-28 RX ORDER — SODIUM CHLORIDE 9 MG/ML
INJECTION, SOLUTION INTRAVENOUS CONTINUOUS PRN
Status: DISCONTINUED | OUTPATIENT
Start: 2024-06-28 | End: 2024-06-28 | Stop reason: SDUPTHER

## 2024-06-28 RX ORDER — ACETAMINOPHEN 500 MG
1000 TABLET ORAL ONCE
Status: COMPLETED | OUTPATIENT
Start: 2024-06-28 | End: 2024-06-28

## 2024-06-28 RX ORDER — OXYCODONE HYDROCHLORIDE 5 MG/1
10 TABLET ORAL EVERY 4 HOURS PRN
Status: DISCONTINUED | OUTPATIENT
Start: 2024-06-28 | End: 2024-06-29 | Stop reason: HOSPADM

## 2024-06-28 RX ORDER — CELECOXIB 100 MG/1
200 CAPSULE ORAL ONCE
Status: COMPLETED | OUTPATIENT
Start: 2024-06-28 | End: 2024-06-28

## 2024-06-28 RX ORDER — ROCURONIUM BROMIDE 10 MG/ML
INJECTION, SOLUTION INTRAVENOUS PRN
Status: DISCONTINUED | OUTPATIENT
Start: 2024-06-28 | End: 2024-06-28 | Stop reason: SDUPTHER

## 2024-06-28 RX ORDER — SODIUM CHLORIDE 0.9 % (FLUSH) 0.9 %
5-40 SYRINGE (ML) INJECTION PRN
Status: DISCONTINUED | OUTPATIENT
Start: 2024-06-28 | End: 2024-06-28 | Stop reason: HOSPADM

## 2024-06-28 RX ORDER — ACETAMINOPHEN 500 MG
1000 TABLET ORAL EVERY 8 HOURS SCHEDULED
Status: DISCONTINUED | OUTPATIENT
Start: 2024-06-28 | End: 2024-06-29 | Stop reason: HOSPADM

## 2024-06-28 RX ORDER — FENTANYL CITRATE 50 UG/ML
INJECTION, SOLUTION INTRAMUSCULAR; INTRAVENOUS PRN
Status: DISCONTINUED | OUTPATIENT
Start: 2024-06-28 | End: 2024-06-28 | Stop reason: SDUPTHER

## 2024-06-28 RX ORDER — OXYCODONE HYDROCHLORIDE 5 MG/1
5 TABLET ORAL EVERY 4 HOURS PRN
Status: DISCONTINUED | OUTPATIENT
Start: 2024-06-28 | End: 2024-06-29 | Stop reason: HOSPADM

## 2024-06-28 RX ORDER — LIDOCAINE HYDROCHLORIDE 20 MG/ML
INJECTION, SOLUTION EPIDURAL; INFILTRATION; INTRACAUDAL; PERINEURAL PRN
Status: DISCONTINUED | OUTPATIENT
Start: 2024-06-28 | End: 2024-06-28 | Stop reason: SDUPTHER

## 2024-06-28 RX ORDER — TRANEXAMIC ACID 10 MG/ML
1000 INJECTION, SOLUTION INTRAVENOUS ONCE
Status: COMPLETED | OUTPATIENT
Start: 2024-06-28 | End: 2024-06-28

## 2024-06-28 RX ORDER — KETAMINE HCL IN NACL, ISO-OSM 100MG/10ML
SYRINGE (ML) INJECTION PRN
Status: DISCONTINUED | OUTPATIENT
Start: 2024-06-28 | End: 2024-06-28 | Stop reason: SDUPTHER

## 2024-06-28 RX ORDER — PROCHLORPERAZINE EDISYLATE 5 MG/ML
5 INJECTION INTRAMUSCULAR; INTRAVENOUS
Status: DISCONTINUED | OUTPATIENT
Start: 2024-06-28 | End: 2024-06-28 | Stop reason: HOSPADM

## 2024-06-28 RX ORDER — MIDAZOLAM HYDROCHLORIDE 1 MG/ML
INJECTION INTRAMUSCULAR; INTRAVENOUS PRN
Status: DISCONTINUED | OUTPATIENT
Start: 2024-06-28 | End: 2024-06-28 | Stop reason: SDUPTHER

## 2024-06-28 RX ORDER — BISACODYL 5 MG/1
5 TABLET, DELAYED RELEASE ORAL DAILY
Status: DISCONTINUED | OUTPATIENT
Start: 2024-06-28 | End: 2024-06-29 | Stop reason: HOSPADM

## 2024-06-28 RX ORDER — SODIUM CHLORIDE 9 MG/ML
INJECTION, SOLUTION INTRAVENOUS CONTINUOUS
Status: DISCONTINUED | OUTPATIENT
Start: 2024-06-28 | End: 2024-06-29 | Stop reason: HOSPADM

## 2024-06-28 RX ORDER — ONDANSETRON 2 MG/ML
INJECTION INTRAMUSCULAR; INTRAVENOUS PRN
Status: DISCONTINUED | OUTPATIENT
Start: 2024-06-28 | End: 2024-06-28 | Stop reason: SDUPTHER

## 2024-06-28 RX ORDER — ONDANSETRON 4 MG/1
4 TABLET, ORALLY DISINTEGRATING ORAL EVERY 8 HOURS PRN
Status: DISCONTINUED | OUTPATIENT
Start: 2024-06-28 | End: 2024-06-29 | Stop reason: HOSPADM

## 2024-06-28 RX ORDER — DEXAMETHASONE SODIUM PHOSPHATE 10 MG/ML
8 INJECTION, SOLUTION INTRAMUSCULAR; INTRAVENOUS ONCE
Status: DISCONTINUED | OUTPATIENT
Start: 2024-06-28 | End: 2024-06-28 | Stop reason: HOSPADM

## 2024-06-28 RX ORDER — MEPERIDINE HYDROCHLORIDE 25 MG/ML
12.5 INJECTION INTRAMUSCULAR; INTRAVENOUS; SUBCUTANEOUS EVERY 5 MIN PRN
Status: DISCONTINUED | OUTPATIENT
Start: 2024-06-28 | End: 2024-06-28 | Stop reason: HOSPADM

## 2024-06-28 RX ORDER — METHOCARBAMOL 100 MG/ML
1000 INJECTION, SOLUTION INTRAMUSCULAR; INTRAVENOUS ONCE
Status: DISCONTINUED | OUTPATIENT
Start: 2024-06-28 | End: 2024-06-28 | Stop reason: SDUPTHER

## 2024-06-28 RX ORDER — SODIUM CHLORIDE 9 MG/ML
INJECTION, SOLUTION INTRAVENOUS CONTINUOUS
Status: DISCONTINUED | OUTPATIENT
Start: 2024-06-28 | End: 2024-06-28 | Stop reason: HOSPADM

## 2024-06-28 RX ORDER — PROPOFOL 10 MG/ML
INJECTION, EMULSION INTRAVENOUS PRN
Status: DISCONTINUED | OUTPATIENT
Start: 2024-06-28 | End: 2024-06-28 | Stop reason: SDUPTHER

## 2024-06-28 RX ORDER — DEXAMETHASONE SODIUM PHOSPHATE 10 MG/ML
10 INJECTION INTRAMUSCULAR; INTRAVENOUS ONCE
Status: COMPLETED | OUTPATIENT
Start: 2024-06-29 | End: 2024-06-29

## 2024-06-28 RX ADMIN — HYDROMORPHONE HYDROCHLORIDE 0.5 MG: 1 INJECTION, SOLUTION INTRAMUSCULAR; INTRAVENOUS; SUBCUTANEOUS at 13:49

## 2024-06-28 RX ADMIN — DEXAMETHASONE SODIUM PHOSPHATE 8 MG: 4 INJECTION, SOLUTION INTRAMUSCULAR; INTRAVENOUS at 12:31

## 2024-06-28 RX ADMIN — LIDOCAINE HYDROCHLORIDE 100 MG: 20 INJECTION, SOLUTION EPIDURAL; INFILTRATION; INTRACAUDAL; PERINEURAL at 11:11

## 2024-06-28 RX ADMIN — WATER 2000 MG: 1 INJECTION INTRAMUSCULAR; INTRAVENOUS; SUBCUTANEOUS at 18:45

## 2024-06-28 RX ADMIN — ONDANSETRON 4 MG: 2 INJECTION INTRAMUSCULAR; INTRAVENOUS at 12:34

## 2024-06-28 RX ADMIN — ACETAMINOPHEN 1000 MG: 500 TABLET ORAL at 09:35

## 2024-06-28 RX ADMIN — SODIUM CHLORIDE: 9 INJECTION, SOLUTION INTRAVENOUS at 11:06

## 2024-06-28 RX ADMIN — OXYCODONE 10 MG: 5 TABLET ORAL at 15:35

## 2024-06-28 RX ADMIN — FENTANYL CITRATE 50 MCG: 50 INJECTION, SOLUTION INTRAMUSCULAR; INTRAVENOUS at 11:11

## 2024-06-28 RX ADMIN — Medication 3 MG: at 13:06

## 2024-06-28 RX ADMIN — HYDROMORPHONE HYDROCHLORIDE 0.5 MG: 1 INJECTION, SOLUTION INTRAMUSCULAR; INTRAVENOUS; SUBCUTANEOUS at 23:39

## 2024-06-28 RX ADMIN — MIDAZOLAM 2 MG: 1 INJECTION INTRAMUSCULAR; INTRAVENOUS at 11:06

## 2024-06-28 RX ADMIN — METHOCARBAMOL 1000 MG: 100 INJECTION INTRAMUSCULAR; INTRAVENOUS at 14:31

## 2024-06-28 RX ADMIN — OXYCODONE 10 MG: 5 TABLET ORAL at 20:04

## 2024-06-28 RX ADMIN — TRANEXAMIC ACID 1000 MG: 10 INJECTION, SOLUTION INTRAVENOUS at 11:23

## 2024-06-28 RX ADMIN — ROCURONIUM BROMIDE 50 MG: 10 INJECTION, SOLUTION INTRAVENOUS at 11:11

## 2024-06-28 RX ADMIN — SODIUM CHLORIDE: 9 INJECTION, SOLUTION INTRAVENOUS at 16:33

## 2024-06-28 RX ADMIN — PROPOFOL 200 MG: 10 INJECTION, EMULSION INTRAVENOUS at 11:11

## 2024-06-28 RX ADMIN — HYDROMORPHONE HYDROCHLORIDE 0.5 MG: 1 INJECTION, SOLUTION INTRAMUSCULAR; INTRAVENOUS; SUBCUTANEOUS at 14:10

## 2024-06-28 RX ADMIN — GLYCOPYRROLATE 0.6 MG: 0.2 INJECTION INTRAMUSCULAR; INTRAVENOUS at 13:06

## 2024-06-28 RX ADMIN — ACETAMINOPHEN 1000 MG: 500 TABLET ORAL at 17:49

## 2024-06-28 RX ADMIN — ASPIRIN 325 MG: 325 TABLET ORAL at 16:30

## 2024-06-28 RX ADMIN — FENTANYL CITRATE 50 MCG: 50 INJECTION, SOLUTION INTRAMUSCULAR; INTRAVENOUS at 11:46

## 2024-06-28 RX ADMIN — CELECOXIB 200 MG: 100 CAPSULE ORAL at 09:35

## 2024-06-28 RX ADMIN — TRANEXAMIC ACID 1000 MG: 10 INJECTION, SOLUTION INTRAVENOUS at 14:14

## 2024-06-28 RX ADMIN — BISACODYL 5 MG: 5 TABLET, COATED ORAL at 16:30

## 2024-06-28 RX ADMIN — WATER 2000 MG: 1 INJECTION INTRAMUSCULAR; INTRAVENOUS; SUBCUTANEOUS at 11:16

## 2024-06-28 RX ADMIN — Medication 20 MG: at 11:11

## 2024-06-28 ASSESSMENT — PAIN SCALES - GENERAL
PAINLEVEL_OUTOF10: 5
PAINLEVEL_OUTOF10: 10
PAINLEVEL_OUTOF10: 9
PAINLEVEL_OUTOF10: 7
PAINLEVEL_OUTOF10: 9
PAINLEVEL_OUTOF10: 4
PAINLEVEL_OUTOF10: 10
PAINLEVEL_OUTOF10: 5
PAINLEVEL_OUTOF10: 4
PAINLEVEL_OUTOF10: 9

## 2024-06-28 ASSESSMENT — LIFESTYLE VARIABLES: SMOKING_STATUS: 1

## 2024-06-28 ASSESSMENT — PAIN DESCRIPTION - ORIENTATION
ORIENTATION: RIGHT
ORIENTATION: MID
ORIENTATION: RIGHT

## 2024-06-28 ASSESSMENT — PAIN DESCRIPTION - LOCATION
LOCATION: BACK
LOCATION: HIP

## 2024-06-28 ASSESSMENT — PAIN - FUNCTIONAL ASSESSMENT
PAIN_FUNCTIONAL_ASSESSMENT: ACTIVITIES ARE NOT PREVENTED
PAIN_FUNCTIONAL_ASSESSMENT: PREVENTS OR INTERFERES SOME ACTIVE ACTIVITIES AND ADLS
PAIN_FUNCTIONAL_ASSESSMENT: 0-10
PAIN_FUNCTIONAL_ASSESSMENT: PREVENTS OR INTERFERES SOME ACTIVE ACTIVITIES AND ADLS
PAIN_FUNCTIONAL_ASSESSMENT: ACTIVITIES ARE NOT PREVENTED

## 2024-06-28 ASSESSMENT — PAIN SCALES - WONG BAKER
WONGBAKER_NUMERICALRESPONSE: HURTS A LITTLE BIT

## 2024-06-28 ASSESSMENT — PAIN DESCRIPTION - DESCRIPTORS
DESCRIPTORS: DISCOMFORT
DESCRIPTORS: ACHING;CRAMPING;DISCOMFORT
DESCRIPTORS: ACHING;CRUSHING;DISCOMFORT
DESCRIPTORS: DISCOMFORT
DESCRIPTORS: ACHING;THROBBING
DESCRIPTORS: ACHING;SORE;THROBBING
DESCRIPTORS: DISCOMFORT
DESCRIPTORS: DISCOMFORT

## 2024-06-28 ASSESSMENT — PAIN DESCRIPTION - ONSET
ONSET: ON-GOING
ONSET: ON-GOING

## 2024-06-28 ASSESSMENT — PAIN DESCRIPTION - PAIN TYPE
TYPE: SURGICAL PAIN
TYPE: ACUTE PAIN
TYPE: SURGICAL PAIN

## 2024-06-28 ASSESSMENT — PAIN DESCRIPTION - FREQUENCY
FREQUENCY: CONTINUOUS
FREQUENCY: CONTINUOUS

## 2024-06-28 NOTE — ANESTHESIA POSTPROCEDURE EVALUATION
Department of Anesthesiology  Postprocedure Note    Patient: Kathe Caldera  MRN: [id number]  YOB: 1965  Date of evaluation: 6/28/2024    Procedure Summary       Date: 06/28/24 Room / Location: [address]    Anesthesia Start: 1106 Anesthesia Stop: 1328    Procedure: RIGHT HIP TOTAL ARTHROPLASTY           +++RICHARD+++ (Right: Hip) Diagnosis:       Primary osteoarthritis of right hip      (Primary osteoarthritis of right hip [M16.11])    Surgeons: Heraclio Tirado MD Responsible Provider: Angelia Robert DO    Anesthesia Type: general ASA Status: 3            Anesthesia Type: No value filed.    Buddy Phase I: Buddy Score: 10    Buddy Phase II:      Anesthesia Post Evaluation    Patient location during evaluation: PACU  Patient participation: complete - patient participated  Level of consciousness: awake and alert  Pain score: 4  Airway patency: patent  Nausea & Vomiting: no nausea and no vomiting  Cardiovascular status: blood pressure returned to baseline  Respiratory status: acceptable  Hydration status: euvolemic  Pain management: adequate and satisfactory to patient        No notable events documented.

## 2024-06-28 NOTE — OP NOTE
Operative Note      Patient: Kathe Caldera  YOB: 1965  MRN: [id number]    Date of Procedure: 6/28/2024    Pre-Op Diagnosis Codes:     * Primary osteoarthritis of right hip [M16.11]    Post-Op Diagnosis: Same       Procedure(s):  RIGHT HIP TOTAL ARTHROPLASTY    Surgeon(s):  Heraclio Tirado MD    Assistant:   Surgical Assistant: J Carlos Castellanos; Naa Grijalva RN  Physician Assistant: Azeme Baer PA    Anesthesia: General    Estimated Blood Loss (mL): 200     Complications: None    Specimens:   ID Type Source Tests Collected by Time Destination   A : RIGHT FEMORAL HEAD Bone Hip SURGICAL PATHOLOGY Heraclio Tirado MD 6/28/2024 1257        Implants:  Implant Name Type Inv. Item Serial No.  Lot No. LRB No. Used Action   HEAD FEM [id number] +0MM OFFSET HIP BIOLOX DELT CERAMIC TAPR - [id number]  HEAD FEM [id number] +0MM OFFSET HIP BIOLOX DELT CERAMIC TAPR  [employer] flatev [id number] Right 1 Implanted   SHELL TRIDENT II CLUSTERHOLE HA ACET 48D - [id number]  SHELL TRIDENT II CLUSTERHOLE HA ACET 48D  RICHARDR[employer]struckS flatev [id number] Right 1 Implanted   INSERT ACET D 0 DEG 32 MM HIP X3 TRIDENT - [id number]  INSERT ACET D 0 DEG 32 MM HIP X3 TRIDENT  [employer] flatev [id number] Right 1 Implanted   STEM FEM SZ 5 L108MM NK L35MM 40MM OFFSET 132DEG HIP TI - [id number]  STEM FEM SZ 5 L108MM NK L35MM 40MM OFFSET 132DEG HIP TI  [employer] flatev [id number] Right 1 Implanted         Drains: * No LDAs found *    Findings:  Infection Present At Time Of Surgery (PATOS) (choose all levels that have infection present):  No infection present  Other Findings: OA    Detailed Description of Procedure:                                  HISTORY:  This patient is a 58 y.o.  female  with progressive pain in the right hip.  X-rays and evaluation revealed significant osteoarthritis of the right hip, bone-on-bone in the weightbearing aspect, with significant peripheral osteophytes. The  the actual 48 mm Trident 2 acetabular component with approximately 40 degrees of abduction and 25 degrees for forward flexion.  The cup was fully seated.  We had excellent purchase.  We inserted a trial size D, 32 mm poly and resected osteophytes from around the margin of the cup.    We applied our trial 132 degree neck and a 32 mm + 0 mm head and reduced the hip.  This gave us excellent soft tissue tension.  We had excellent stability with flexion to 90 degrees and internal rotation to about 90 degrees.  We were also stable in extension and external rotation, as well as mid flexion, adduction, and internal rotation.  We therefore, chose this combination of implants.  We removed all the trials.    We inserted the actual X-3 poly size D, 32 mm for the 48 mm Trident 2 cup.  We then implanted the actual Edgar Accolade 2 femoral component, size 5, 132 degree neck which was fully seated with good purchase.  We then did a final trial reduction with a 32 mm + 0 mm head with excellent stability and soft tissue tension as previously described.  We applied the actual femoral head component and reduced the hip.    Then we copiously irrigated the wound.  We closed the capsule posteriorly with #1 Ethibond figure of eight suture.  We then repaired the short external rotators back to the posterior margins of the greater trochanter with drill holes through bone.  We closed the tensor fascia with #1 Stratofix running suture.  We closed the subcu layer with 2-0 Vicryl inverted suture, and the skin with a running 3-0 Monocryl subcuticular stitch.  Steri-strips were applied to the skin.  Sterile dressing was applied with Aquacel.  The patient was placed in an abductor pillow and turned supine on the hospital bed.    The patient was retrieved from anesthesia and taken to recovery room in good condition, having tolerated the procedure well.  All needle, sponge and instrument counts were correct.    SUMMARY OF IMPLANTS:  We used Tradyo
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
## 2024-06-28 NOTE — CARE COORDINATION
Met with patient about diagnosis and discharge plan of care. Post op right total hip arthoplasty. Pt seen in ortho class. Lives in [facility] home-confirmed on 2nd floor. Elevator. Pt has wheeled walker, ordered 3-1 commode through [facility]. Walk in shower. PCP is Dr Jason Proctor. Plan is home with [facility]-notified and orders completed. [facility] home with  when discharged. Call [phone]. Will follow-o

## 2024-06-28 NOTE — PROGRESS NOTES
4 Eyes Skin Assessment     NAME:  Kathe Caldera  YOB: 1965  MEDICAL RECORD NUMBER:  [id number]    The patient is being assessed for  Admission    I agree that at least one RN has performed a thorough Head to Toe Skin Assessment on the patient. ALL assessment sites listed below have been assessed.      Areas assessed by both nurses:    Head, Face, Ears, Shoulders, Back, Chest, Arms, Elbows, Hands, Sacrum. Buttock, Coccyx, Ischium, Legs. Feet and Heels, and Under Medical Devices         Does the Patient have a Wound? No noted wound(s)       R TOTAL HIP    Terry Prevention initiated by RN: Yes  Wound Care Orders initiated by RN: No    Pressure Injury (Stage 3,4, Unstageable, DTI, NWPT, and Complex wounds) if present, place Wound referral order by RN under : No    New Ostomies, if present place, Ostomy referral order under : No     Nurse 1 eSignature: Electronically signed by Elliott Perez RN on 6/28/24 at 4:23 PM EDT    **SHARE this note so that the co-signing nurse can place an eSignature**    Nurse 2 eSignature: Electronically signed by Willam Cardenas RN on 6/28/24 at 5:29 PM EDT

## 2024-06-28 NOTE — PLAN OF CARE
Problem: Discharge Planning  Goal: Discharge to home or other facility with appropriate resources  Outcome: Progressing     Problem: Pain  Goal: Verbalizes/displays adequate comfort level or baseline comfort level  Outcome: Progressing     Problem: Safety - Adult  Goal: Free from fall injury  Outcome: Progressing     Problem: ABCDS Injury Assessment  Goal: Absence of physical injury  Outcome: Progressing     Problem: Risk for Elopement  Goal: Patient will not exit the unit/facility without proper excort  Outcome: Progressing  Flowsheets (Taken 6/28/2024 1500)  Nursing Interventions for Elopement Risk: Reduce environmental triggers

## 2024-06-28 NOTE — BRIEF OP NOTE
Brief Postoperative Note      Patient: Kathe Caldera  YOB: 1965  MRN: [id number]    Date of Procedure: 6/28/2024    Pre-Op Diagnosis Codes:     * Primary osteoarthritis of right hip [M16.11]    Post-Op Diagnosis: Same       Procedure(s):  RIGHT HIP TOTAL ARTHROPLASTY           +++RICHARD+++    Surgeon(s):  Heraclio Tirado MD    Assistant:  Surgical Assistant: J Carlos Castellanos; Naa Grijalva RN  Physician Assistant: Azeem Baer PA    Anesthesia: Spinal    Estimated Blood Loss (mL): 200     Complications: None    Specimens:   ID Type Source Tests Collected by Time Destination   A : LEFT FEMORAL HEAD Bone Hip SURGICAL PATHOLOGY Heraclio Tirado MD 6/28/2024 1214        Implants:  Implant Name Type Inv. Item Serial No.  Lot No. LRB No. Used Action   HEAD FEM [id number] +0MM OFFSET HIP BIOLOX DELT CERAMIC TAPR - [id number]  HEAD FEM [id number] +0MM OFFSET HIP BIOLOX DELT CERAMIC TAPR  [employer]an[employer]finity[employer]ym [id number] Right 1 Implanted   SHELL TRIDENT II CLUSTERHOLE HA ACET 48D - [id number]  SHELL TRIDENT II CLUSTERHOLE HA ACET 48D  [employer]anZfinity[employer]ym [id number] Right 1 Implanted   INSERT ACET D 0 DEG 32 MM HIP X3 TRIDENT - [id number]  INSERT ACET D 0 DEG 32 MM HIP X3 TRIDENT  [employer] [id number] Right 1 Implanted   STEM FEM SZ 5 L108MM NK L35MM 40MM OFFSET 132DEG HIP TI - [id number]  STEM FEM SZ 5 L108MM NK L35MM 40MM OFFSET 132DEG HIP TI  [employer]an[employer]finity[employer]ym [id number] Right 1 Implanted         Drains: * No LDAs found *    Findings:  Infection Present At Time Of Surgery (PATOS) (choose all levels that have infection present):  No infection present  Other Findings: OA    Electronically signed by Heraclio Tirado MD on 6/28/2024 at 12:43 PM

## 2024-06-29 VITALS
WEIGHT: 230 LBS | DIASTOLIC BLOOD PRESSURE: 86 MMHG | HEIGHT: 70 IN | RESPIRATION RATE: 17 BRPM | OXYGEN SATURATION: 94 % | TEMPERATURE: 99 F | SYSTOLIC BLOOD PRESSURE: 115 MMHG | BODY MASS INDEX: 32.93 KG/M2 | HEART RATE: 68 BPM

## 2024-06-29 LAB
HCT VFR BLD AUTO: 32.1 % (ref 34–48)
HGB BLD-MCNC: 10 G/DL (ref 11.5–15.5)

## 2024-06-29 PROCEDURE — 6360000002 HC RX W HCPCS: Performed by: ORTHOPAEDIC SURGERY

## 2024-06-29 PROCEDURE — 97161 PT EVAL LOW COMPLEX 20 MIN: CPT

## 2024-06-29 PROCEDURE — 85014 HEMATOCRIT: CPT

## 2024-06-29 PROCEDURE — 97165 OT EVAL LOW COMPLEX 30 MIN: CPT

## 2024-06-29 PROCEDURE — 2580000003 HC RX 258: Performed by: ORTHOPAEDIC SURGERY

## 2024-06-29 PROCEDURE — 85018 HEMOGLOBIN: CPT

## 2024-06-29 PROCEDURE — 6370000000 HC RX 637 (ALT 250 FOR IP): Performed by: ORTHOPAEDIC SURGERY

## 2024-06-29 PROCEDURE — G0378 HOSPITAL OBSERVATION PER HR: HCPCS

## 2024-06-29 PROCEDURE — 97530 THERAPEUTIC ACTIVITIES: CPT

## 2024-06-29 PROCEDURE — 97535 SELF CARE MNGMENT TRAINING: CPT

## 2024-06-29 PROCEDURE — 96374 THER/PROPH/DIAG INJ IV PUSH: CPT

## 2024-06-29 PROCEDURE — 97110 THERAPEUTIC EXERCISES: CPT

## 2024-06-29 RX ORDER — OXYCODONE HYDROCHLORIDE 10 MG/1
5-10 TABLET ORAL EVERY 4 HOURS PRN
Qty: 42 TABLET | Refills: 0 | Status: SHIPPED | OUTPATIENT
Start: 2024-06-29 | End: 2024-07-06

## 2024-06-29 RX ORDER — ASPIRIN 325 MG
325 TABLET ORAL DAILY
Qty: 28 TABLET | Refills: 0 | Status: SHIPPED | OUTPATIENT
Start: 2024-06-29

## 2024-06-29 RX ORDER — ACETAMINOPHEN 500 MG
1000 TABLET ORAL EVERY 8 HOURS
Qty: 100 TABLET | Refills: 0 | Status: SHIPPED | OUTPATIENT
Start: 2024-06-29

## 2024-06-29 RX ADMIN — SODIUM CHLORIDE, PRESERVATIVE FREE 10 ML: 5 INJECTION INTRAVENOUS at 09:19

## 2024-06-29 RX ADMIN — OXYCODONE 10 MG: 5 TABLET ORAL at 11:08

## 2024-06-29 RX ADMIN — BISACODYL 5 MG: 5 TABLET, COATED ORAL at 09:19

## 2024-06-29 RX ADMIN — DEXAMETHASONE SODIUM PHOSPHATE 10 MG: 10 INJECTION INTRAMUSCULAR; INTRAVENOUS at 11:08

## 2024-06-29 RX ADMIN — ACETAMINOPHEN 1000 MG: 500 TABLET ORAL at 04:21

## 2024-06-29 RX ADMIN — SODIUM CHLORIDE: 9 INJECTION, SOLUTION INTRAVENOUS at 01:03

## 2024-06-29 RX ADMIN — CELECOXIB 200 MG: 100 CAPSULE ORAL at 09:19

## 2024-06-29 RX ADMIN — OXYCODONE 10 MG: 5 TABLET ORAL at 07:01

## 2024-06-29 RX ADMIN — WATER 2000 MG: 1 INJECTION INTRAMUSCULAR; INTRAVENOUS; SUBCUTANEOUS at 04:21

## 2024-06-29 RX ADMIN — ASPIRIN 325 MG: 325 TABLET ORAL at 09:19

## 2024-06-29 ASSESSMENT — PAIN DESCRIPTION - ORIENTATION
ORIENTATION: RIGHT
ORIENTATION: RIGHT;LEFT
ORIENTATION: RIGHT

## 2024-06-29 ASSESSMENT — PAIN SCALES - GENERAL
PAINLEVEL_OUTOF10: 4
PAINLEVEL_OUTOF10: 10
PAINLEVEL_OUTOF10: 4
PAINLEVEL_OUTOF10: 10
PAINLEVEL_OUTOF10: 4

## 2024-06-29 ASSESSMENT — PAIN DESCRIPTION - LOCATION
LOCATION: HIP
LOCATION: HIP
LOCATION: BACK;HIP

## 2024-06-29 ASSESSMENT — PAIN DESCRIPTION - PAIN TYPE: TYPE: SURGICAL PAIN;ACUTE PAIN

## 2024-06-29 ASSESSMENT — PAIN - FUNCTIONAL ASSESSMENT
PAIN_FUNCTIONAL_ASSESSMENT: PREVENTS OR INTERFERES SOME ACTIVE ACTIVITIES AND ADLS
PAIN_FUNCTIONAL_ASSESSMENT: ACTIVITIES ARE NOT PREVENTED
PAIN_FUNCTIONAL_ASSESSMENT: ACTIVITIES ARE NOT PREVENTED

## 2024-06-29 ASSESSMENT — PAIN DESCRIPTION - DESCRIPTORS
DESCRIPTORS: ACHING;THROBBING
DESCRIPTORS: ACHING;THROBBING
DESCRIPTORS: ACHING;CRAMPING;DISCOMFORT

## 2024-06-29 ASSESSMENT — PAIN DESCRIPTION - FREQUENCY: FREQUENCY: CONTINUOUS

## 2024-06-29 ASSESSMENT — PAIN DESCRIPTION - ONSET: ONSET: ON-GOING

## 2024-06-29 ASSESSMENT — PAIN SCALES - WONG BAKER
WONGBAKER_NUMERICALRESPONSE: HURTS A LITTLE BIT
WONGBAKER_NUMERICALRESPONSE: HURTS A LITTLE BIT

## 2024-06-29 NOTE — PROGRESS NOTES
went to see patient while rounding. Patient was unavailable. Patient was absent from the room.   prayed a silent prayer for the patient and left devotional material and information about  services.  Chaplains will remain available to offer spiritual and emotional support as needed.

## 2024-06-29 NOTE — PROGRESS NOTES
OCCUPATIONAL THERAPY INITIAL EVALUATION    [facility]   [address] [facility]        Date:2024                                                  Patient Name: Kathe Caldera    MRN: [id number]    : 1965    Room: [address] [place] [address]    Evaluating OT: Tanika Rodriguez OTR/L #[id number]    Referring Provider:  Heraclio Tirado MD     Specific Provider Orders/Date:  OT Eval and Treat , 2024     Diagnosis:   1. Primary osteoarthritis of right hip        Surgery: S/p R ARABELLA 24      Pertinent Medical History: Anxiety, schizo-affective psychosis     Precautions:  Fall Risk, WBAT R LE, posterolateral hip precautions      Assessment of current deficits    [x] Functional mobility  [x]ADLs  [x] Strength               []Cognition    [x] Functional transfers   [x] IADLs         [x] Safety Awareness   [x]Endurance    [] Fine Coordination              [x] Balance      [] Vision/perception   []Sensation     []Gross Motor Coordination  [] ROM  [] Delirium                   [] Motor Control     OT PLAN OF CARE   OT POC based on physician orders, patient diagnosis and results of clinical assessment    Frequency/Duration 2-5 days/wk for 2-4 weeks BID PRN     Specific OT Treatment Interventions to include:   * Instruction/training on adapted ADL techniques and AE recommendations to increase functional independence within precautions       * Training on energy conservation strategies, correct breathing pattern and techniques to improve independence/tolerance for self-care routine  * Functional transfer/mobility training/DME recommendations for increased independence, safety, and fall prevention  * Patient/Family education to increase follow through with safety techniques and functional independence  * Recommendation of environmental modifications for increased safety with functional transfers/mobility and ADLs  * Therapeutic exercise to improve motor  this date includes:   Instruction, education and training on safe facilitation and adapted techniques for completion of ADLs. These include safe functional transfer techniques (including simulated car transfer techniques) and energy conservation for completion of ADLs. Education provided on hip precautions, hand/feet placement with walker and body mechanics for fall prevention. Cues for energy conservation and safety for in the home at DC, including modifications and DME. Extended time to complete all tasks, including skilled monitoring of patient's response during treatment session and vital signs. Prior to and at the end of session, environmental modifications / line management completed for patients safety and efficiency of treatment session. See above for further details.    Rehab Potential: Good for established goals.      Patient / Family Goal: Return to PLOF       Patient and/or family were instructed on functional diagnosis, prognosis/goals and OT plan of care. Demonstrated good understanding.     Eval Complexity: Low    Time In: 9:20 AM   Time Out: 9:58 AM    Total Treatment Time: 23      Min Units   OT Eval Low 97165  X  1    OT Eval Medium 06857      OT Eval High 68705      OT Re-Eval 19675            ADL/Self Care 24996 15 1   Therapeutic Activities 10237 8 1   Therapeutic Ex 37032       Orthotic Management 06051       Manual 10350     Neuro Re-Ed 37987       Non-Billable Time        Evaluation Time additionally includes thorough review of current medical information, gathering information on past medical history/social history and prior level of function, interpretation of standardized testing/informal observation of tasks, assessment of data and development of plan of care and goals.        Evaluating OT: Tanika Rodriguez OTR/L #[id number]

## 2024-06-29 NOTE — PLAN OF CARE
Problem: Discharge Planning  Goal: Discharge to home or other facility with appropriate resources  6/28/2024 2147 by Davis Poole, RN  Outcome: Progressing     Problem: Pain  Goal: Verbalizes/displays adequate comfort level or baseline comfort level  6/28/2024 2147 by Davis Poole, RN  Outcome: Progressing     Problem: Safety - Adult  Goal: Free from fall injury  6/28/2024 2147 by Davis Poole, RN  Outcome: Progressing     Problem: ABCDS Injury Assessment  Goal: Absence of physical injury  6/28/2024 2147 by Davis Poole, RN  Outcome: Progressing

## 2024-06-29 NOTE — PROGRESS NOTES
Physical Therapy  Facility/Department: [address] INTERMEDIATE MED SURG  Physical Therapy Initial Assessment    Name: Kathe Caldera  : 1965  MRN: [id number]  Date of Service: 2024    Attending Provider:  Heraclio Tirado MD    Evaluating PT:  Lee Bartlett Jr., P.T.    Room #:  32/0732-A  Diagnosis:  Primary osteoarthritis of right hip [M16.11]  Pertinent PMHx/PSHx:  Schizo-affective psychosis  Procedure/Surgery:  R ARABELLA 24  Precautions:  WBAT RLE, R hip post-lateral precautions    SUBJECTIVE:    Pt lives in a group home in a 2 story home with a ramp to enter.  There is an elevator to 2nd floor and then 2 steps and 2 rails to her 2nd floor bed and bath.  Pt ambulated with ww lately PTA.    OBJECTIVE:   Initial Evaluation  Date: 24 Treatment Short Term/ Long Term   Goals   Was pt agreeable to Eval/treatment? yes     Does pt have pain? Mild R hp pain     Bed Mobility  Rolling: Independent  Supine to sit: Independent   Sit to supine: NA  Scooting: Independent   Independent   Transfers Sit to stand: supervision  Stand to sit: supervision  Stand pivot: supervision with ww  Independent    Ambulation   250 feet with ww SBA  350 feet with ww Independent    Stair negotiation: ascended and descended 4 steps with 2 rails SBA  4 steps with 2 rails supervision   AM-PAC 6 Clicks        BLE ROM is WFL and R hip is WFL allowed by hip precautions.   LLE strength is grossly 4/5 to 4+/5 and RLE is grossly 3-/5 to 4/5.   Sensation:  Pt denies numbness and tingling to extremities  Balance: sitting is Independent and standing with ww is supervision  Endurance: fair+    Therapeutic Exercises:  Pt was instructed in/performed supine exercises with the RLE: ankle PF/DF with bed sheet 2x15 reps, heel slides and quad sets AAROM with bed sheet 2x10 reps, hip ABD/ADD AAROM 2x10 reps.     Patient education  Pt educated on R hip precautions, above exs as HEP, gait training with ww and on stairs.     Patient  and independence with all functional transfers   [x] Gait Training to improve gait mechanics, endurance and assess need for appropriate assistive device  [x] Stair Training in preparation for safe discharge home and/or into the community   [] Positioning to prevent skin breakdown and contractures  [] Safety and Education Training   [x] Patient/Caregiver Education   [x] HEP  [] Other     PT long term treatment goals are located in above grid    Frequency of treatments: BID    Time in  08:40  Time out  09:10    Total Treatment Time 20 minutes     Evaluation Time includes thorough review of current medical information, gathering information on past medical history/social history and prior level of function, completion of standardized testing/informal observation of tasks, assessment of data and education on plan of care and goals.    CPT codes:  [x] Low Complexity PT evaluation 32197  [] Moderate Complexity PT evaluation 69475  [] High Complexity PT evaluation 28578  [] PT Re-evaluation 66397  [] Gait training 09075 ** minutes  [] Manual therapy 63265 ** minutes  [x] Therapeutic activities 53248 10 minutes  [x] Therapeutic exercises 56367 10 minutes  [] Neuromuscular reeducation 58947 ** minutes     Lee Bartlett Jr., P.T.  License Number: [id number]

## 2024-06-29 NOTE — DISCHARGE INSTRUCTIONS
Orthopaedic Discharge Instructions:    1)  [facility] for home care    2)  ECASA 325 mg daily for 4 weeks after surgery    3)  Durable Medical Equipment (DME) as needed    4)  Home Physical Therapy - Weight bear as tolerated on Right leg, Walker for assistance, progress to cane when ready.  Let Dr. Tirado's office know when patient is ready to go to Out Patient Physical Therapy, and a script will be sent to the therapy facility she has been going to previously.    5)  May shower at home with Aquacel dressing in place.    6)  Wear compression stockings during the day. Okay to remove at bedtime.     7)  Elevate legs as needed to manage swelling.    8)  Remove Aquacel dressing post op day 7.  Leave incision open to air.

## 2024-06-29 NOTE — PLAN OF CARE
Problem: Discharge Planning  Goal: Discharge to home or other facility with appropriate resources  6/29/2024 0951 by Elliott Perez RN  Outcome: Progressing  6/28/2024 2147 by Davis Poole, RN  Outcome: Progressing     Problem: Pain  Goal: Verbalizes/displays adequate comfort level or baseline comfort level  6/29/2024 0951 by Elliott Perez RN  Outcome: Progressing  6/28/2024 2147 by Davis Poole RN  Outcome: Progressing     Problem: Safety - Adult  Goal: Free from fall injury  6/29/2024 0951 by Elliott Perez RN  Outcome: Progressing  6/28/2024 2147 by Davis Poole, RN  Outcome: Progressing     Problem: ABCDS Injury Assessment  Goal: Absence of physical injury  6/29/2024 0951 by Elliott Perez RN  Outcome: Progressing  6/28/2024 2147 by Davis Poole, RN  Outcome: Progressing     Problem: Risk for Elopement  Goal: Patient will not exit the unit/facility without proper excort  6/29/2024 0951 by Elliott Perez RN  Outcome: Progressing  6/28/2024 2147 by Davis Poole, RN  Outcome: Progressing

## 2024-06-29 NOTE — PROGRESS NOTES
[facility]    Post op Day  1      S:  Complaints: none    O:  Afebrile, Vital signs stable     Dressing Intact   Full range of motion right ankle and toes   Sensation intact to light touch     H/H:   Recent Labs     06/29/24  0415   HGB 10.0*   HCT 32.1*        PT/INR: No results for input(s): \"PROT\", \"INR\" in the last 72 hours.                Xray:  stable implants    A/P:  Stable   Acute post-op blood loss anemia-stable   Proceed with Physical Therapy                            Heplock IV's if PO intake is adequate                 Evaluate for Home Discharge    Home health care needed secondary to unsteady gait, walker for ambulation,                      and need for home physical therapy.

## 2024-07-10 LAB — SURGICAL PATHOLOGY REPORT: NORMAL

## 2024-07-29 ENCOUNTER — HOSPITAL ENCOUNTER (OUTPATIENT)
Dept: PHYSICAL THERAPY | Age: 59
Setting detail: THERAPIES SERIES
Discharge: HOME | End: 2024-07-29
Payer: MEDICAID

## 2024-07-29 PROCEDURE — 97161 PT EVAL LOW COMPLEX 20 MIN: CPT

## 2024-07-30 NOTE — PROGRESS NOTES
Physical Therapy                  [facility]                Phone: [phone]   Fax: [phone]    Physical Therapy Daily Treatment Note  Date:  2024    Patient Name:  Kathe Caldera    :  1965  MRN: [id number]    Restrictions/Precautions:  WBAT, Universal ARABELLA restrictions  Diagnosis:  S/P RT ARABELLA   Referring Physician:  MD Azeem Olivera, PAC   Plan of care signed (Y/N):    Visit# / total visits:  -  Pain level: /10   Time In:  Time Out:    Subjective:      Exercises:  Exercise/Equipment Resistance/Repetitions Other comments                                                                                                                                             Home Exercise Program:      Manual Treatments:      Modalities:      Treatment/Activity Tolerance:  [] Patient tolerated treatment well [] Patient limited by fatique  [] Patient limited by pain  [] Patient limited by other medical complications  [] Other:     Plan:   [] Continue per plan of care [] Alter current plan (see comments)  [] Plan of care initiated [] Hold pending MD visit [] Discharge    Electronically signed by:  Azeem Grant PT

## 2024-07-31 ENCOUNTER — HOSPITAL ENCOUNTER (OUTPATIENT)
Dept: PHYSICAL THERAPY | Age: 59
Setting detail: THERAPIES SERIES
Discharge: HOME | End: 2024-07-31
Payer: MEDICAID

## 2024-07-31 PROCEDURE — 97110 THERAPEUTIC EXERCISES: CPT

## 2024-07-31 NOTE — PROGRESS NOTES
Physical Therapy                  [facility]                Phone: [phone]   Fax: [phone]    Physical Therapy Daily Treatment Note  Date:  2024    Patient Name:  Kathe Caldera    :  1965  MRN: [id number]    Restrictions/Precautions:  WBAT, Universal ARABELLA restrictions  Diagnosis:  S/P RT ARABELLA   Referring Physician:  MD Azeem Olivera, PAC   Plan of care signed (Y/N):    Visit# / total visits:  -  Pain level: /10   Time In: 1:19  Time Out: 2:00    Subjective:      O: See grid sheet  Amb with QC with a slower steady mildly antalgic gait RTLE mid stance  ++ MM fasciculations RT quad     A/P: See grid comments.       Exercises:  Exercise/Equipment Resistance/Repetitions Other comments    Onestep  10'  Indep placement feet to foot plates     H. Slides/hip flexion AROM  4 x 10  Great AROM and no real inc pain @ all reported.             SAQ 2# @ 3 x 10 with a 2 sec ext hold ++ RT quad MM fasciculations. Full knee Ext, No c/o any pain but just weakness.     LAQ  2# @ 3 x 10 with a 1-2 sec Ext hold  Full knee Ext, + MM fasciculations RT quad.     Supine ball squeezes  2 x 10 with a 3 sec squeeze hold Great hip adductor MMT RT, no hip pain but did have a RT HS MM spasms for which she had to sit up and allow recovery/was very brief.     Supine TB hip Abd iso   BL TB @ 3 x 10  No diff @ all, no ROM at RT hip and just knee abd 20*    Sitting hip flex 2 x 10  No diff raising knee @ all, no RT hip pain.     Wedge heel raises  3 x 10  Mini heel raises B/L    Standing hip flex   3 x 10 RT Getting good 80* w/o difficulties, no pain in hip with AROM    Standing knee curls        // side stepping        Box step ups                                                   Home Exercise Program:      Manual Treatments:      Modalities:      Treatment/Activity Tolerance:  [] Patient tolerated treatment well [] Patient limited by fatique  [] Patient limited by pain  [] Patient

## 2024-08-02 ENCOUNTER — HOSPITAL ENCOUNTER (OUTPATIENT)
Dept: PHYSICAL THERAPY | Age: 59
Setting detail: THERAPIES SERIES
Discharge: HOME | End: 2024-08-02
Payer: MEDICAID

## 2024-08-02 PROCEDURE — 97530 THERAPEUTIC ACTIVITIES: CPT

## 2024-08-02 PROCEDURE — 97110 THERAPEUTIC EXERCISES: CPT

## 2024-08-02 NOTE — PROGRESS NOTES
Physical Therapy                  [facility]                Phone: [phone]   Fax: [phone]    Physical Therapy Daily Treatment Note  Date:  2024    Patient Name:  Kathe Caldera    :  1965  MRN: [id number]    Restrictions/Precautions:  WBAT, Universal ARABELLA restrictions  Diagnosis:  S/P RT ARABELLA   Referring Physician:  MD Azeem Olivera, PAC   Plan of care signed (Y/N):    Visit# / total visits:  3/12-  Pain level: /10   Time In: 12:56  Time Out: 1:52    Subjective:  Arrived to clinic w/o any AD and walking well. Quit using AD Wed and no issues with not using it.  Pain under control and minimal.    Is extremely extremely happy with surgeon and her progress.  HEP going well with no issues during or post exercising leg.       O: See grid sheet  Amb with no AD and gait pretty much normalized and minimal mid stance antalgias.  No safety issues w/o using AD  Much less MM fasciculations RT quad     A/P: See grid comments. Ambulated to clinic w/o QC use with a pretty much normalized gait with just a minimal mid stance antalgic loading.  Safe with   not using any AD within clinic and out of clinic. No c/o pain with all there-x done on grid.  Improving endurance's/MMT as evidence much less MM fasciculations today.       Exercises:  Exercise/Equipment Resistance/Repetitions Other comments    Onestep  10'  Indep placement feet to foot plates     H. Slides/hip flexion AROM  3# @ 4 x 10  Great AROM and no real inc pain @ all reported.             SAQ 3# @ 4 x 10 with a 2 sec ext hold No RT quad MM fasciculations. Full knee Ext, No c/o any pain but just weakness.     LAQ  3# @ 4 x 10 with a 1-2 sec Ext hold  Full knee Ext, No MM fasciculations RT quad.     Supine ball squeezes  3 x 10 with a 2-3 sec squeeze hold Great hip adductor MMT RT, no MM spasms      Supine TB hip/knee Abd   Purple TB @ 4 x 10  No diff @ all,      Sitting hip flex 3# @ 3 x 10  No diff raising knee @

## 2024-08-05 ENCOUNTER — HOSPITAL ENCOUNTER (OUTPATIENT)
Dept: PHYSICAL THERAPY | Age: 59
Setting detail: THERAPIES SERIES
Discharge: HOME | End: 2024-08-05
Payer: MEDICAID

## 2024-08-05 PROCEDURE — 97530 THERAPEUTIC ACTIVITIES: CPT

## 2024-08-05 PROCEDURE — 97110 THERAPEUTIC EXERCISES: CPT

## 2024-08-05 NOTE — PROGRESS NOTES
Physical Therapy                  [facility]                Phone: [phone]   Fax: [phone]    Physical Therapy Daily Treatment Note  Date:  2024    Patient Name:  Kathe Caldera    :  1965  MRN: [id number]    Restrictions/Precautions:  WBAT, Universal ARABELLA restrictions  Diagnosis:  S/P RT ARABELLA 24  Referring Physician:  MD Azeem Olivera, PAC   Plan of care signed (Y/N):    Visit# / total visits:  -   Time In: 1:10  Time Out: 2:03    Subjective:  24: Reports no pain in RT hip but still has some anterior hip \"numbness\" which is causing the antalgic gait when asked.   Reports no pain leading to the mid stance antalgic kinetic load behaviors.   HEP going well.  No longer using can at all anywhere and no feeling as LOB or   weakness/buckle in leg at all.  Reports stiffness in AM and goes away quickly only. Is very very happy with surgeon and PT getting her to where she is now.    Doing a lot of walking now and no pain just fatigue from not doing anything for so long.  Thinks next ortho Dr visit Aug 28th     O: See grid sheet  Amb with no AD and gait pretty much normalized and minimal mid stance antalgias.  No safety issues w/o using AD  Much less MM fasciculations RT quad     A/P: See grid comments. Ambulated to clinic w/o QC use with a pretty much normalized gait with just a minimal mid stance antalgic loading.  Safe with   not using any AD within clinic and out of clinic. No c/o pain with all there-x done on grid.  Improving endurance's/MMT as evidence much less MM fasciculations today.      24: Arrived to clinic w/o any AD and walking well. Quit using AD Wed and no issues with not using it.  Pain under control and minimal.    Is extremely extremely happy with surgeon and her progress.  HEP going well with no issues during or post exercising leg.         Exercises:  Exercise/Equipment Resistance/Repetitions Other comments    Onestep  10'  Indep

## 2024-08-07 ENCOUNTER — HOSPITAL ENCOUNTER (OUTPATIENT)
Dept: PHYSICAL THERAPY | Age: 59
Setting detail: THERAPIES SERIES
Discharge: HOME | End: 2024-08-07
Payer: MEDICAID

## 2024-08-07 PROCEDURE — 97110 THERAPEUTIC EXERCISES: CPT

## 2024-08-07 PROCEDURE — 97530 THERAPEUTIC ACTIVITIES: CPT

## 2024-08-07 NOTE — PROGRESS NOTES
Physical Therapy                  [facility]                Phone: [phone]   Fax: [phone]    Physical Therapy Daily Treatment Note  Date:  2024    Patient Name:  Kathe Caldera    :  1965  MRN: [id number]    Restrictions/Precautions:  WBAT, Universal ARABELLA restrictions  Diagnosis:  S/P RT ARABELLA 24  Referring Physician:  MD Azeem Olivera, PAC   Plan of care signed (Y/N):    Visit# / total visits:  -   Time In: 1:13  Time Out: 2:10    Subjective:  24: Reports     O: See grid sheet  Amb with no AD and gait pretty much normalized and minimal mid stance antalgias.  No safety issues w/o using AD  Much less MM fasciculations RT quad     A/P: See grid comments. Ambulated to clinic w/o QC use with a pretty much normalized gait with just a minimal mid stance antalgic loading.  Safe with   not using any AD within clinic and out of clinic. No c/o pain with all there-x done on grid.  Improving endurance's/MMT as evidence much less MM fasciculations today.      24: Reports no pain in RT hip but still has some anterior hip \"numbness\" which is causing the antalgic gait when asked.   Reports no pain leading to the mid stance antalgic kinetic load behaviors.   HEP going well.  No longer using can at all anywhere and no feeling as LOB or   weakness/buckle in leg at all.  Reports stiffness in AM and goes away quickly only. Is very very happy with surgeon and PT getting her to where she is now.    Doing a lot of walking now and no pain just fatigue from not doing anything for so long.  Thinks next ortho Dr visit Aug 28th     8/2/24: Arrived to clinic w/o any AD and walking well. Quit using AD Wed and no issues with not using it.  Pain under control and minimal.    Is extremely extremely happy with surgeon and her progress.  HEP going well with no issues during or post exercising leg.         Exercises:  Exercise/Equipment Resistance/Repetitions Other comments

## 2024-08-09 ENCOUNTER — HOSPITAL ENCOUNTER (OUTPATIENT)
Dept: PHYSICAL THERAPY | Age: 59
Setting detail: THERAPIES SERIES
Discharge: HOME | End: 2024-08-09
Payer: MEDICAID

## 2024-08-09 PROCEDURE — 97110 THERAPEUTIC EXERCISES: CPT

## 2024-08-09 PROCEDURE — 97530 THERAPEUTIC ACTIVITIES: CPT

## 2024-08-09 NOTE — PROGRESS NOTES
Physical Therapy                  [facility]                Phone: [phone]   Fax: [phone]    Physical Therapy Daily Treatment Note  Date:  2024    Patient Name:  Kathe Caldera    :  1965  MRN: [id number]    Restrictions/Precautions:  WBAT, Universal ARABELLA restrictions  Diagnosis:  S/P RT ARABELLA 24  Referring Physician:  MD Azeem Olivera, PAC   Plan of care signed (Y/N):    Visit# / total visits:  -   Time In: 1:09  Time Out: 2:10    Subjective:  24: Reports     O: See grid sheet  Amb with no AD and gait pretty much normalized and minimal mid stance antalgias.  No safety issues w/o using AD  Much less MM fasciculations RT quad     A/P: See grid comments. Ambulated to clinic w/o QC use with a pretty much normalized gait with just a minimal mid stance antalgic loading.  Safe with   not using any AD within clinic and out of clinic. No c/o pain with all there-x done on grid.  Improving endurance's/MMT as evidence much less MM fasciculations today.      24: Reports no pain in RT hip but still has some anterior hip \"numbness\" which is causing the antalgic gait when asked.   Reports no pain leading to the mid stance antalgic kinetic load behaviors.   HEP going well.  No longer using can at all anywhere and no feeling as LOB or   weakness/buckle in leg at all.  Reports stiffness in AM and goes away quickly only. Is very very happy with surgeon and PT getting her to where she is now.    Doing a lot of walking now and no pain just fatigue from not doing anything for so long.  Thinks next ortho Dr visit Aug 28th     8/2/24: Arrived to clinic w/o any AD and walking well. Quit using AD Wed and no issues with not using it.  Pain under control and minimal.    Is extremely extremely happy with surgeon and her progress.  HEP going well with no issues during or post exercising leg.         Exercises:  Exercise/Equipment Resistance/Repetitions Other comments

## 2024-08-12 ENCOUNTER — HOSPITAL ENCOUNTER (OUTPATIENT)
Dept: PHYSICAL THERAPY | Age: 59
Setting detail: THERAPIES SERIES
Discharge: HOME | End: 2024-08-12
Payer: MEDICAID

## 2024-08-12 NOTE — PROGRESS NOTES
[facility]                Phone: [phone]  Fax: [phone]    Physical Therapy  Cancellation/No-show Note  Patient Name:  Kathe Caldera  :  1965   Date:  2024    For today's appointment patient:  [x]  Cancelled  []  Rescheduled appointment  []  No-show     Reason given by patient:  []  Patient ill  []  Conflicting appointment  []  No transportation    []  Conflict with work  []  No reason given  []  Other:     Comments:      Electronically signed by:  Azeem Grant, PT

## 2024-08-19 ENCOUNTER — HOSPITAL ENCOUNTER (OUTPATIENT)
Dept: PHYSICAL THERAPY | Age: 59
Setting detail: THERAPIES SERIES
Discharge: HOME | End: 2024-08-19
Payer: MEDICAID

## 2024-08-19 PROCEDURE — 97110 THERAPEUTIC EXERCISES: CPT

## 2024-08-19 PROCEDURE — 97530 THERAPEUTIC ACTIVITIES: CPT

## 2024-08-19 NOTE — PROGRESS NOTES
Program and Blue TB provided    Manual Treatments:  NA    Modalities: NA     Treatment/Activity Tolerance:  [x] Patient tolerated treatment well [x] Patient limited by fatigue RT hip   [] Patient limited by pain  [] Patient limited by other medical complications  [] Other:     Plan:   [x] Continue per plan of care [] Alter current plan (see comments)  [] Plan of care initiated [] Hold pending MD visit [] Discharge    Electronically signed by:  Azeem Grant PT

## 2024-08-21 ENCOUNTER — HOSPITAL ENCOUNTER (OUTPATIENT)
Dept: PHYSICAL THERAPY | Age: 59
Setting detail: THERAPIES SERIES
Discharge: HOME | End: 2024-08-21
Payer: MEDICAID

## 2024-08-21 PROCEDURE — 97530 THERAPEUTIC ACTIVITIES: CPT

## 2024-08-21 PROCEDURE — 97110 THERAPEUTIC EXERCISES: CPT

## 2024-08-21 NOTE — PROGRESS NOTES
Physical Therapy                  [facility]                Phone: [phone]   Fax: [phone]    Physical Therapy Daily Treatment Note  Date:  2024    Patient Name:  Kathe Caldera    :  1965  MRN: [id number]    Restrictions/Precautions:  WBAT, Universal ARABELLA restrictions  Diagnosis:  S/P RT ARABELLA 24  Referring Physician:  MD Azeem Cowart, PAC   Plan of care signed (Y/N):    Visit# / total visits:  -   Time In: 12:35  Time Out: 1:14 (had to leave early and she inc her pace (dec reps) as son back in town from [place])     Subjective:  24: Reports no pain in the hip with just weakness. No use of any AD and no diff getting around and no jacklyn. Next Dr Ortho visit Aug 29th @ 3:15.    Walked to [place] which took her 8-10' and no steps required.     O: See grid sheet  Amb with no AD and gait pretty much normalized and minimal mid stance antalgias.  No safety issues w/o using AD  Much less MM fasciculations RT quad     A/P: See grid comments. Ambulated to clinic w/o QC use with a pretty much normalized gait with just a minimal mid stance antalgic loading.  Safe with   not using any AD within clinic and out of clinic. No c/o pain with all there-x done on grid.  Improving endurance's/MMT as evidence much less MM fasciculations today.      24: Reports no pain in RT hip but still has some anterior hip \"numbness\" which is causing the antalgic gait when asked.   Reports no pain leading to the mid stance antalgic kinetic load behaviors.   HEP going well.  No longer using can at all anywhere and no feeling as LOB or   weakness/buckle in leg at all.  Reports stiffness in AM and goes away quickly only. Is very very happy with surgeon and PT getting her to where she is now.    Doing a lot of walking now and no pain just fatigue from not doing anything for so long.  Thinks next ortho Dr visit Aug 28th     8/2/24: Arrived to clinic w/o any AD and walking

## 2024-08-23 ENCOUNTER — HOSPITAL ENCOUNTER (OUTPATIENT)
Dept: PHYSICAL THERAPY | Age: 59
Setting detail: THERAPIES SERIES
Discharge: HOME | End: 2024-08-23
Payer: MEDICAID

## 2024-08-23 NOTE — PROGRESS NOTES
[facility]                Phone: [phone]  Fax: [phone]    Physical Therapy  Cancellation/No-show Note  Patient Name:  Kathe Caldera  :  1965   Date:  2024    For today's appointment patient:  []  Cancelled  []  Rescheduled appointment  [x]  No-show     Reason given by patient:  []  Patient ill  []  Conflicting appointment  []  No transportation    []  Conflict with work  []  No reason given  []  Other:     Comments:      Electronically signed by:  Azeem Grant, PT

## 2024-08-23 NOTE — PROGRESS NOTES
[facility]                Phone: [phone]  Fax: [phone]    Physical Therapy  Cancellation/No-show Note  Patient Name:  Kathe Caldera  :  1965   Date:  2024    For today's appointment patient:  []  Cancelled  []  Rescheduled appointment  [x]  No-show     Reason given by patient:  []  Patient ill  []  Conflicting appointment  []  No transportation    []  Conflict with work  []  No reason given  []  Other:     Comments:      Electronically signed by:  Azeem Grant, PT

## 2024-08-26 ENCOUNTER — HOSPITAL ENCOUNTER (OUTPATIENT)
Dept: PHYSICAL THERAPY | Age: 59
Setting detail: THERAPIES SERIES
Discharge: HOME | End: 2024-08-26
Payer: MEDICAID

## 2024-08-26 NOTE — PROGRESS NOTES
[facility]                Phone: [phone]  Fax: [phone]    Physical Therapy  Cancellation/No-show Note  Patient Name:  Kathe Caldera  :  1965   Date:  2024    For today's appointment patient:  [x]  Cancelled  []  Rescheduled appointment  []  No-show     Reason given by patient:  []  Patient ill  []  Conflicting appointment  []  No transportation    []  Conflict with work  []  No reason given  []  Other:     Comments:  Ill     Electronically signed by:  Azeem Grant, PT

## 2024-08-28 ENCOUNTER — HOSPITAL ENCOUNTER (OUTPATIENT)
Dept: PHYSICAL THERAPY | Age: 59
Setting detail: THERAPIES SERIES
Discharge: HOME | End: 2024-08-28
Payer: MEDICAID

## 2024-08-28 NOTE — PROGRESS NOTES
[facility]                Phone: [phone]  Fax: [phone]    Physical Therapy  Cancellation/No-show Note  Patient Name:  Kathe Caldera  :  1965   Date:  2024    For today's appointment patient:  []  Cancelled  []  Rescheduled appointment  [x]  No-show     Reason given by patient:  []  Patient ill  []  Conflicting appointment  []  No transportation    []  Conflict with work  []  No reason given  []  Other:     Comments:  Last seen 24. Today makes NS x 3.       Electronically signed by:  Azeem Grant PT

## 2024-08-30 ENCOUNTER — APPOINTMENT (OUTPATIENT)
Dept: PHYSICAL THERAPY | Age: 59
End: 2024-08-30
Payer: MEDICAID

## 2024-09-04 ENCOUNTER — HOSPITAL ENCOUNTER (OUTPATIENT)
Dept: PHYSICAL THERAPY | Age: 59
Setting detail: THERAPIES SERIES
Discharge: HOME | End: 2024-09-04

## 2024-09-04 NOTE — PROGRESS NOTES
[facility]                Phone: [phone]   Fax: [phone]      Physical Therapy  Non compliance/Attendance Issue    DATE:   9/4/2024            MRN: [id number]     PATIENT NAME:  Kathe Caldera              Diagnosis:  S/P RT ARABELLA     Total Visits to Date: 8  Cancels/No shows to Date: NS since 8/21/24     Dear MD Azeem Cowart Dr, PAC     This is to notify you that per our physical therapy department policy your patient, noted above, is being discharged from Physical Therapy due to the following reason(s):     If a Physical Therapy out patient is absent from three consecutive scheduled appointments without notification    If a Physical Therapy out patient is absent for 50% of the scheduled visits       the patient will be discharged from physical therapy services. A new prescription will be necessary to resume physical therapy.     If you have any questions, please feel free to contact me at [phone].    Thanks You,    Electronically Signed by: Azeem Grant, PT      9/4/2024

## 2024-09-04 NOTE — PROGRESS NOTES
[facility]                Phone: [phone]  Fax: [phone]    Physical Therapy  Cancellation/No-show Note  Patient Name:  Kathe Caldera  :  1965   Date:  2024    For today's appointment patient:  []  Cancelled  []  Rescheduled appointment  [x]  No-show     Reason given by patient:  []  Patient ill  []  Conflicting appointment  []  No transportation    []  Conflict with work  []  No reason given  []  Other:     Comments:  Will D/C today 2/2 non-compliance NS status    Electronically signed by:  Azeem Grant PT

## 2024-10-22 RX ORDER — ATORVASTATIN CALCIUM 40 MG/1
40 TABLET, FILM COATED ORAL DAILY
Qty: 90 TABLET | Refills: 1 | Status: SHIPPED | OUTPATIENT
Start: 2024-10-22

## 2024-11-06 RX ORDER — AMLODIPINE BESYLATE 10 MG/1
10 TABLET ORAL DAILY
Qty: 30 TABLET | Refills: 5 | Status: SHIPPED | OUTPATIENT
Start: 2024-11-06

## 2024-11-20 ENCOUNTER — TELEPHONE (OUTPATIENT)
Age: 59
End: 2024-11-20

## 2024-11-25 ENCOUNTER — APPOINTMENT (OUTPATIENT)
Dept: CT IMAGING | Age: 59
End: 2024-11-25
Attending: EMERGENCY MEDICINE
Payer: MEDICAID

## 2024-11-25 ENCOUNTER — APPOINTMENT (OUTPATIENT)
Dept: GENERAL RADIOLOGY | Age: 59
End: 2024-11-25
Payer: MEDICAID

## 2024-11-25 ENCOUNTER — HOSPITAL ENCOUNTER (EMERGENCY)
Age: 59
Discharge: HOME | End: 2024-11-25
Attending: EMERGENCY MEDICINE
Payer: MEDICAID

## 2024-11-25 VITALS
HEART RATE: 79 BPM | OXYGEN SATURATION: 99 % | SYSTOLIC BLOOD PRESSURE: 128 MMHG | RESPIRATION RATE: 18 BRPM | DIASTOLIC BLOOD PRESSURE: 77 MMHG | TEMPERATURE: 98.5 F

## 2024-11-25 DIAGNOSIS — R09.81 NASAL CONGESTION: ICD-10-CM

## 2024-11-25 DIAGNOSIS — R11.0 NAUSEA: Primary | ICD-10-CM

## 2024-11-25 LAB
ALBUMIN SERPL-MCNC: 4.2 G/DL (ref 3.5–5.2)
ALP SERPL-CCNC: 211 U/L (ref 35–104)
ALT SERPL-CCNC: 9 U/L (ref 0–32)
ANION GAP SERPL CALCULATED.3IONS-SCNC: 11 MMOL/L (ref 7–16)
AST SERPL-CCNC: 16 U/L (ref 0–31)
B PARAP IS1001 DNA NPH QL NAA+NON-PROBE: NOT DETECTED
B PERT DNA SPEC QL NAA+PROBE: NOT DETECTED
BASOPHILS # BLD: 0.02 K/UL (ref 0–0.2)
BASOPHILS NFR BLD: 0 % (ref 0–2)
BILIRUB SERPL-MCNC: 0.2 MG/DL (ref 0–1.2)
BNP SERPL-MCNC: 53 PG/ML (ref 0–125)
BUN SERPL-MCNC: 9 MG/DL (ref 6–20)
C PNEUM DNA NPH QL NAA+NON-PROBE: NOT DETECTED
CALCIUM SERPL-MCNC: 9.1 MG/DL (ref 8.6–10.2)
CHLORIDE SERPL-SCNC: 106 MMOL/L (ref 98–107)
CO2 SERPL-SCNC: 24 MMOL/L (ref 22–29)
CREAT SERPL-MCNC: 0.9 MG/DL (ref 0.5–1)
EKG ATRIAL RATE: 66 BPM
EKG P AXIS: 49 DEGREES
EKG P-R INTERVAL: 178 MS
EKG Q-T INTERVAL: 428 MS
EKG QRS DURATION: 92 MS
EKG QTC CALCULATION (BAZETT): 448 MS
EKG R AXIS: 18 DEGREES
EKG T AXIS: 22 DEGREES
EKG VENTRICULAR RATE: 66 BPM
EOSINOPHIL # BLD: 0.17 K/UL (ref 0.05–0.5)
EOSINOPHILS RELATIVE PERCENT: 2 % (ref 0–6)
ERYTHROCYTE [DISTWIDTH] IN BLOOD BY AUTOMATED COUNT: 16.4 % (ref 11.5–15)
FLUAV RNA NPH QL NAA+NON-PROBE: NOT DETECTED
FLUBV RNA NPH QL NAA+NON-PROBE: NOT DETECTED
GFR, ESTIMATED: 72 ML/MIN/1.73M2
GLUCOSE SERPL-MCNC: 123 MG/DL (ref 74–99)
HADV DNA NPH QL NAA+NON-PROBE: NOT DETECTED
HCOV 229E RNA NPH QL NAA+NON-PROBE: NOT DETECTED
HCOV HKU1 RNA NPH QL NAA+NON-PROBE: NOT DETECTED
HCOV NL63 RNA NPH QL NAA+NON-PROBE: NOT DETECTED
HCOV OC43 RNA NPH QL NAA+NON-PROBE: NOT DETECTED
HCT VFR BLD AUTO: 37.5 % (ref 34–48)
HGB BLD-MCNC: 11.6 G/DL (ref 11.5–15.5)
HMPV RNA NPH QL NAA+NON-PROBE: NOT DETECTED
HPIV1 RNA NPH QL NAA+NON-PROBE: NOT DETECTED
HPIV2 RNA NPH QL NAA+NON-PROBE: NOT DETECTED
HPIV3 RNA NPH QL NAA+NON-PROBE: NOT DETECTED
HPIV4 RNA NPH QL NAA+NON-PROBE: NOT DETECTED
IMM GRANULOCYTES # BLD AUTO: 0.05 K/UL (ref 0–0.58)
IMM GRANULOCYTES NFR BLD: 1 % (ref 0–5)
LACTATE BLDV-SCNC: 1.1 MMOL/L (ref 0.5–2.2)
LIPASE SERPL-CCNC: 17 U/L (ref 13–60)
LYMPHOCYTES NFR BLD: 3.02 K/UL (ref 1.5–4)
LYMPHOCYTES RELATIVE PERCENT: 31 % (ref 20–42)
M PNEUMO DNA NPH QL NAA+NON-PROBE: NOT DETECTED
MCH RBC QN AUTO: 24 PG (ref 26–35)
MCHC RBC AUTO-ENTMCNC: 30.9 G/DL (ref 32–34.5)
MCV RBC AUTO: 77.5 FL (ref 80–99.9)
MONOCYTES NFR BLD: 0.51 K/UL (ref 0.1–0.95)
MONOCYTES NFR BLD: 5 % (ref 2–12)
NEUTROPHILS NFR BLD: 62 % (ref 43–80)
NEUTS SEG NFR BLD: 6.09 K/UL (ref 1.8–7.3)
PLATELET # BLD AUTO: 401 K/UL (ref 130–450)
PMV BLD AUTO: 10.1 FL (ref 7–12)
POTASSIUM SERPL-SCNC: 4.2 MMOL/L (ref 3.5–5)
PROT SERPL-MCNC: 8.1 G/DL (ref 6.4–8.3)
RBC # BLD AUTO: 4.84 M/UL (ref 3.5–5.5)
RSV RNA NPH QL NAA+NON-PROBE: NOT DETECTED
RV+EV RNA NPH QL NAA+NON-PROBE: NOT DETECTED
SARS-COV-2 RNA NPH QL NAA+NON-PROBE: NOT DETECTED
SODIUM SERPL-SCNC: 141 MMOL/L (ref 132–146)
SPECIMEN DESCRIPTION: NORMAL
TROPONIN I SERPL HS-MCNC: <6 NG/L (ref 0–9)
WBC OTHER # BLD: 9.9 K/UL (ref 4.5–11.5)

## 2024-11-25 PROCEDURE — 93005 ELECTROCARDIOGRAM TRACING: CPT | Performed by: EMERGENCY MEDICINE

## 2024-11-25 PROCEDURE — 2580000003 HC RX 258: Performed by: EMERGENCY MEDICINE

## 2024-11-25 PROCEDURE — 96375 TX/PRO/DX INJ NEW DRUG ADDON: CPT

## 2024-11-25 PROCEDURE — 80053 COMPREHEN METABOLIC PANEL: CPT

## 2024-11-25 PROCEDURE — 6360000002 HC RX W HCPCS: Performed by: EMERGENCY MEDICINE

## 2024-11-25 PROCEDURE — 74176 CT ABD & PELVIS W/O CONTRAST: CPT

## 2024-11-25 PROCEDURE — 96374 THER/PROPH/DIAG INJ IV PUSH: CPT

## 2024-11-25 PROCEDURE — 0202U NFCT DS 22 TRGT SARS-COV-2: CPT

## 2024-11-25 PROCEDURE — 84484 ASSAY OF TROPONIN QUANT: CPT

## 2024-11-25 PROCEDURE — 83880 ASSAY OF NATRIURETIC PEPTIDE: CPT

## 2024-11-25 PROCEDURE — 93010 ELECTROCARDIOGRAM REPORT: CPT | Performed by: INTERNAL MEDICINE

## 2024-11-25 PROCEDURE — 83605 ASSAY OF LACTIC ACID: CPT

## 2024-11-25 PROCEDURE — 96361 HYDRATE IV INFUSION ADD-ON: CPT

## 2024-11-25 PROCEDURE — 99285 EMERGENCY DEPT VISIT HI MDM: CPT

## 2024-11-25 PROCEDURE — 71045 X-RAY EXAM CHEST 1 VIEW: CPT

## 2024-11-25 PROCEDURE — 83690 ASSAY OF LIPASE: CPT

## 2024-11-25 PROCEDURE — 85025 COMPLETE CBC W/AUTO DIFF WBC: CPT

## 2024-11-25 RX ORDER — ONDANSETRON 2 MG/ML
4 INJECTION INTRAMUSCULAR; INTRAVENOUS ONCE
Status: COMPLETED | OUTPATIENT
Start: 2024-11-25 | End: 2024-11-25

## 2024-11-25 RX ORDER — OXYMETAZOLINE HYDROCHLORIDE 0.05 G/100ML
2 SPRAY NASAL 2 TIMES DAILY
Qty: 1 EACH | Refills: 0 | Status: SHIPPED | OUTPATIENT
Start: 2024-11-25 | End: 2024-12-25

## 2024-11-25 RX ORDER — 0.9 % SODIUM CHLORIDE 0.9 %
1000 INTRAVENOUS SOLUTION INTRAVENOUS ONCE
Status: COMPLETED | OUTPATIENT
Start: 2024-11-25 | End: 2024-11-25

## 2024-11-25 RX ORDER — KETOROLAC TROMETHAMINE 30 MG/ML
15 INJECTION, SOLUTION INTRAMUSCULAR; INTRAVENOUS ONCE
Status: COMPLETED | OUTPATIENT
Start: 2024-11-25 | End: 2024-11-25

## 2024-11-25 RX ADMIN — KETOROLAC TROMETHAMINE 15 MG: 30 INJECTION, SOLUTION INTRAMUSCULAR at 13:48

## 2024-11-25 RX ADMIN — SODIUM CHLORIDE 1000 ML: 9 INJECTION, SOLUTION INTRAVENOUS at 10:29

## 2024-11-25 RX ADMIN — ONDANSETRON 4 MG: 2 INJECTION INTRAMUSCULAR; INTRAVENOUS at 13:48

## 2024-11-25 ASSESSMENT — PAIN SCALES - GENERAL
PAINLEVEL_OUTOF10: 7
PAINLEVEL_OUTOF10: 9

## 2024-11-25 ASSESSMENT — PAIN DESCRIPTION - DESCRIPTORS: DESCRIPTORS: BURNING;ACHING;CRAMPING

## 2024-11-25 ASSESSMENT — PAIN - FUNCTIONAL ASSESSMENT
PAIN_FUNCTIONAL_ASSESSMENT: 0-10
PAIN_FUNCTIONAL_ASSESSMENT: NONE - DENIES PAIN
PAIN_FUNCTIONAL_ASSESSMENT: ACTIVITIES ARE NOT PREVENTED

## 2024-11-25 ASSESSMENT — PAIN DESCRIPTION - ORIENTATION: ORIENTATION: LEFT;RIGHT;UPPER;LOWER

## 2024-11-25 ASSESSMENT — PAIN DESCRIPTION - LOCATION: LOCATION: BACK
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
## 2024-12-10 ENCOUNTER — TELEPHONE (OUTPATIENT)
Age: 59
End: 2024-12-10

## 2024-12-23 DIAGNOSIS — G89.29 OTHER CHRONIC PAIN: ICD-10-CM

## 2024-12-23 DIAGNOSIS — J44.9 CHRONIC OBSTRUCTIVE PULMONARY DISEASE, UNSPECIFIED COPD TYPE (HCC): ICD-10-CM

## 2024-12-23 DIAGNOSIS — E55.9 VITAMIN D DEFICIENCY: ICD-10-CM

## 2024-12-23 DIAGNOSIS — K21.9 GASTROESOPHAGEAL REFLUX DISEASE WITHOUT ESOPHAGITIS: ICD-10-CM

## 2024-12-23 DIAGNOSIS — F17.200 TOBACCO DEPENDENCE: ICD-10-CM

## 2024-12-23 NOTE — TELEPHONE ENCOUNTER
Patient wants to all her medication go to the [facility] from now on.

## 2024-12-24 RX ORDER — ACETAMINOPHEN 500 MG
500 TABLET ORAL 4 TIMES DAILY PRN
Qty: 360 TABLET | Refills: 1 | Status: SHIPPED | OUTPATIENT
Start: 2024-12-24

## 2024-12-24 RX ORDER — BUDESONIDE AND FORMOTEROL FUMARATE DIHYDRATE 160; 4.5 UG/1; UG/1
2 AEROSOL RESPIRATORY (INHALATION) 2 TIMES DAILY
Qty: 10.2 G | Refills: 3 | Status: SHIPPED | OUTPATIENT
Start: 2024-12-24

## 2024-12-24 RX ORDER — AMLODIPINE BESYLATE 10 MG/1
10 TABLET ORAL DAILY
Qty: 30 TABLET | Refills: 5 | Status: SHIPPED | OUTPATIENT
Start: 2024-12-24

## 2024-12-24 RX ORDER — PANTOPRAZOLE SODIUM 40 MG/1
40 TABLET, DELAYED RELEASE ORAL DAILY
Qty: 90 TABLET | Refills: 1 | Status: SHIPPED | OUTPATIENT
Start: 2024-12-24

## 2024-12-24 RX ORDER — NICOTINE 21 MG/24HR
1 PATCH, TRANSDERMAL 24 HOURS TRANSDERMAL DAILY
Qty: 42 PATCH | Refills: 0 | Status: SHIPPED | OUTPATIENT
Start: 2024-12-24 | End: 2025-02-04

## 2024-12-24 RX ORDER — ATORVASTATIN CALCIUM 40 MG/1
40 TABLET, FILM COATED ORAL DAILY
Qty: 90 TABLET | Refills: 1 | Status: SHIPPED | OUTPATIENT
Start: 2024-12-24

## 2024-12-24 RX ORDER — ALBUTEROL SULFATE 90 UG/1
2 AEROSOL, METERED RESPIRATORY (INHALATION) EVERY 6 HOURS PRN
Qty: 18 G | Refills: 3 | Status: SHIPPED | OUTPATIENT
Start: 2024-12-24

## 2024-12-24 RX ORDER — ERGOCALCIFEROL 1.25 MG/1
50000 CAPSULE, LIQUID FILLED ORAL WEEKLY
Qty: 12 CAPSULE | Refills: 1 | Status: SHIPPED | OUTPATIENT
Start: 2024-12-24

## 2025-02-05 NOTE — PROGRESS NOTES
[facility]                Phone: [phone]   Fax: [phone]    Physical Therapy  Out Patient Initial Evaluation    Date:  2024    Patient Name:  Kathe Caldera    :  1965  MRN: [id number]    DIAGNOSIS:  S/P RT ARABELLA  EVALUATION DATE:  24  REFERRING PHYSICIAN:  MD Azeem Olivera, PAC  ONSET DATE:  24     PROBLEMS FOUND DURING EVALUATION  Post operative RT hip pain variable form 0/10 at rest to 4/10 closed chain kinetic loading.  AROM impairment RT hip flex 80*, abd 20* 2/2 MMT impairment   Dec RT hip MMT 3+-4/5 globally  Reliance/need for QC for indep dynamic mobility 2/2 weakness/pain and balances.  Standing balance impairment     SHORT TERM GOALS (2-3 wks)  Pain reduced variable 0-2/10 open/closed chain kinetic loading.  Imp pain free functional AROM 25% or >  Imp MMT RT hip .5-1 Gr globally  Imp gait cycle with QC/SC use outside home and no AD within home.  Imp/gain in functional mobility with stair climb with 1 HR guide, curb negotiation  Indep HEP    LONG TERM GOALS (3-6 wks)  No pain in RT hip in both open chain and closed chain kinetic functional bipedal and unilateral loading.  AROM pain free and WNL all planes  MMT RTLE 5/5 open/closed chain loading.  Off all AD with a normalized gait cycle and N balances.     PATIENT GOALS  No pain in hip  Off all AD for mobility  Return to pre pain activity levels.    REHAB POTENTIAL:  Good    RECOMMENDED TREATMENT PLAN TO ACHIEVE THE MUTUAL LONG TERM GOALS:  Progressive/graduated ROM/MMT, Gait train, Balance train, functional training, inst HEP    FREQUENCY/DURATION:  3x/week leveling off to 2x/week x 4-6 wks pending progress/set backs.    Thank you for the opportunity to work with your patient. If you have questions or comments, please feel free to contact me by phone or fax.      Electronically Signed by Azeem Grant, PT  2024        ___________________________________   Renal Diets (for dialysis)

## 2025-02-19 ENCOUNTER — TELEPHONE (OUTPATIENT)
Age: 60
End: 2025-02-19

## 2025-02-19 NOTE — TELEPHONE ENCOUNTER
Patient confirm appt for tomorrow.  
Diet, Regular - Pediatric:   Eating Disorder-1200 Calories (HP7871) (05-21-24 @ 00:52) [Active]

## 2025-02-20 ENCOUNTER — OFFICE VISIT (OUTPATIENT)
Age: 60
End: 2025-02-20
Payer: MEDICAID

## 2025-02-20 VITALS
HEIGHT: 70 IN | RESPIRATION RATE: 18 BRPM | DIASTOLIC BLOOD PRESSURE: 70 MMHG | SYSTOLIC BLOOD PRESSURE: 118 MMHG | OXYGEN SATURATION: 98 % | TEMPERATURE: 97.9 F | BODY MASS INDEX: 39.65 KG/M2 | WEIGHT: 277 LBS | HEART RATE: 112 BPM

## 2025-02-20 DIAGNOSIS — Z12.11 SCREENING FOR COLORECTAL CANCER: ICD-10-CM

## 2025-02-20 DIAGNOSIS — F25.9 SCHIZO-AFFECTIVE TYPE SCHIZOPHRENIA, CHRONIC STATE (HCC): ICD-10-CM

## 2025-02-20 DIAGNOSIS — G89.29 CHRONIC BILATERAL LOW BACK PAIN WITH BILATERAL SCIATICA: ICD-10-CM

## 2025-02-20 DIAGNOSIS — R73.9 ELEVATED RANDOM BLOOD GLUCOSE LEVEL: ICD-10-CM

## 2025-02-20 DIAGNOSIS — M54.41 CHRONIC BILATERAL LOW BACK PAIN WITH BILATERAL SCIATICA: ICD-10-CM

## 2025-02-20 DIAGNOSIS — M16.11 PRIMARY OSTEOARTHRITIS OF RIGHT HIP: ICD-10-CM

## 2025-02-20 DIAGNOSIS — Z12.12 SCREENING FOR COLORECTAL CANCER: ICD-10-CM

## 2025-02-20 DIAGNOSIS — E55.9 VITAMIN D DEFICIENCY: ICD-10-CM

## 2025-02-20 DIAGNOSIS — E78.2 MIXED HYPERLIPIDEMIA: ICD-10-CM

## 2025-02-20 DIAGNOSIS — M54.42 CHRONIC BILATERAL LOW BACK PAIN WITH BILATERAL SCIATICA: ICD-10-CM

## 2025-02-20 DIAGNOSIS — I10 PRIMARY HYPERTENSION: Primary | ICD-10-CM

## 2025-02-20 LAB
ALBUMIN: 4 G/DL (ref 3.5–5.2)
ALP BLD-CCNC: 195 U/L (ref 35–104)
ALT SERPL-CCNC: 10 U/L (ref 0–32)
ANION GAP SERPL CALCULATED.3IONS-SCNC: 16 MMOL/L (ref 7–16)
AST SERPL-CCNC: 20 U/L (ref 0–31)
BASOPHILS ABSOLUTE: 0.03 K/UL (ref 0–0.2)
BASOPHILS RELATIVE PERCENT: 0 % (ref 0–2)
BILIRUB SERPL-MCNC: 0.3 MG/DL (ref 0–1.2)
BUN BLDV-MCNC: 10 MG/DL (ref 6–20)
CALCIUM SERPL-MCNC: 9.1 MG/DL (ref 8.6–10.2)
CHLORIDE BLD-SCNC: 100 MMOL/L (ref 98–107)
CHOLESTEROL, TOTAL: 185 MG/DL
CO2: 20 MMOL/L (ref 22–29)
CREAT SERPL-MCNC: 1 MG/DL (ref 0.5–1)
EOSINOPHILS ABSOLUTE: 0.21 K/UL (ref 0.05–0.5)
EOSINOPHILS RELATIVE PERCENT: 2 % (ref 0–6)
GFR, ESTIMATED: 67 ML/MIN/1.73M2
GLUCOSE BLD-MCNC: 162 MG/DL (ref 74–99)
HBA1C MFR BLD: 7.4 % (ref 4–5.6)
HCT VFR BLD CALC: 41.3 % (ref 34–48)
HDLC SERPL-MCNC: 56 MG/DL
HEMOGLOBIN: 12.5 G/DL (ref 11.5–15.5)
IMMATURE GRANULOCYTES %: 1 % (ref 0–5)
IMMATURE GRANULOCYTES ABSOLUTE: 0.06 K/UL (ref 0–0.58)
LDL CHOLESTEROL: 99 MG/DL
LYMPHOCYTES ABSOLUTE: 3.38 K/UL (ref 1.5–4)
LYMPHOCYTES RELATIVE PERCENT: 34 % (ref 20–42)
MCH RBC QN AUTO: 23.9 PG (ref 26–35)
MCHC RBC AUTO-ENTMCNC: 30.3 G/DL (ref 32–34.5)
MCV RBC AUTO: 79 FL (ref 80–99.9)
MONOCYTES ABSOLUTE: 0.72 K/UL (ref 0.1–0.95)
MONOCYTES RELATIVE PERCENT: 7 % (ref 2–12)
NEUTROPHILS ABSOLUTE: 5.5 K/UL (ref 1.8–7.3)
NEUTROPHILS RELATIVE PERCENT: 56 % (ref 43–80)
PDW BLD-RTO: 17.5 % (ref 11.5–15)
PLATELET # BLD: 434 K/UL (ref 130–450)
PMV BLD AUTO: 10.4 FL (ref 7–12)
POTASSIUM SERPL-SCNC: 4.4 MMOL/L (ref 3.5–5)
RBC # BLD: 5.23 M/UL (ref 3.5–5.5)
SODIUM BLD-SCNC: 136 MMOL/L (ref 132–146)
TOTAL PROTEIN: 8.2 G/DL (ref 6.4–8.3)
TRIGL SERPL-MCNC: 151 MG/DL
VLDLC SERPL CALC-MCNC: 30 MG/DL
WBC # BLD: 9.9 K/UL (ref 4.5–11.5)

## 2025-02-20 PROCEDURE — G8427 DOCREV CUR MEDS BY ELIG CLIN: HCPCS | Performed by: FAMILY MEDICINE

## 2025-02-20 PROCEDURE — G8417 CALC BMI ABV UP PARAM F/U: HCPCS | Performed by: FAMILY MEDICINE

## 2025-02-20 PROCEDURE — 36415 COLL VENOUS BLD VENIPUNCTURE: CPT | Performed by: FAMILY MEDICINE

## 2025-02-20 PROCEDURE — 3017F COLORECTAL CA SCREEN DOC REV: CPT | Performed by: FAMILY MEDICINE

## 2025-02-20 PROCEDURE — 3078F DIAST BP <80 MM HG: CPT | Performed by: FAMILY MEDICINE

## 2025-02-20 PROCEDURE — 3074F SYST BP LT 130 MM HG: CPT | Performed by: FAMILY MEDICINE

## 2025-02-20 PROCEDURE — 4004F PT TOBACCO SCREEN RCVD TLK: CPT | Performed by: FAMILY MEDICINE

## 2025-02-20 PROCEDURE — G2211 COMPLEX E/M VISIT ADD ON: HCPCS | Performed by: FAMILY MEDICINE

## 2025-02-20 PROCEDURE — 99214 OFFICE O/P EST MOD 30 MIN: CPT | Performed by: FAMILY MEDICINE

## 2025-02-20 RX ORDER — CYCLOBENZAPRINE HCL 5 MG
5 TABLET ORAL 2 TIMES DAILY PRN
Qty: 20 TABLET | Refills: 0 | Status: SHIPPED | OUTPATIENT
Start: 2025-02-20 | End: 2025-03-02

## 2025-02-20 RX ORDER — LURASIDONE HYDROCHLORIDE 80 MG/1
TABLET, FILM COATED ORAL
COMMUNITY

## 2025-02-20 SDOH — ECONOMIC STABILITY: FOOD INSECURITY: WITHIN THE PAST 12 MONTHS, THE FOOD YOU BOUGHT JUST DIDN'T LAST AND YOU DIDN'T HAVE MONEY TO GET MORE.: NEVER TRUE

## 2025-02-20 SDOH — ECONOMIC STABILITY: FOOD INSECURITY: WITHIN THE PAST 12 MONTHS, YOU WORRIED THAT YOUR FOOD WOULD RUN OUT BEFORE YOU GOT MONEY TO BUY MORE.: NEVER TRUE

## 2025-02-20 ASSESSMENT — PATIENT HEALTH QUESTIONNAIRE - PHQ9
7. TROUBLE CONCENTRATING ON THINGS, SUCH AS READING THE NEWSPAPER OR WATCHING TELEVISION: NOT AT ALL
2. FEELING DOWN, DEPRESSED OR HOPELESS: NOT AT ALL
5. POOR APPETITE OR OVEREATING: NOT AT ALL
SUM OF ALL RESPONSES TO PHQ9 QUESTIONS 1 & 2: 0
SUM OF ALL RESPONSES TO PHQ QUESTIONS 1-9: 0
9. THOUGHTS THAT YOU WOULD BE BETTER OFF DEAD, OR OF HURTING YOURSELF: NOT AT ALL
4. FEELING TIRED OR HAVING LITTLE ENERGY: NOT AT ALL
3. TROUBLE FALLING OR STAYING ASLEEP: NOT AT ALL
1. LITTLE INTEREST OR PLEASURE IN DOING THINGS: NOT AT ALL
8. MOVING OR SPEAKING SO SLOWLY THAT OTHER PEOPLE COULD HAVE NOTICED. OR THE OPPOSITE, BEING SO FIGETY OR RESTLESS THAT YOU HAVE BEEN MOVING AROUND A LOT MORE THAN USUAL: NOT AT ALL
SUM OF ALL RESPONSES TO PHQ QUESTIONS 1-9: 0
10. IF YOU CHECKED OFF ANY PROBLEMS, HOW DIFFICULT HAVE THESE PROBLEMS MADE IT FOR YOU TO DO YOUR WORK, TAKE CARE OF THINGS AT HOME, OR GET ALONG WITH OTHER PEOPLE: NOT DIFFICULT AT ALL
6. FEELING BAD ABOUT YOURSELF - OR THAT YOU ARE A FAILURE OR HAVE LET YOURSELF OR YOUR FAMILY DOWN: NOT AT ALL
SUM OF ALL RESPONSES TO PHQ QUESTIONS 1-9: 0
SUM OF ALL RESPONSES TO PHQ QUESTIONS 1-9: 0

## 2025-02-20 ASSESSMENT — ENCOUNTER SYMPTOMS
COUGH: 0
BACK PAIN: 1
ABDOMINAL PAIN: 0
WHEEZING: 0
DIARRHEA: 0
CONSTIPATION: 0
NAUSEA: 0
VOMITING: 0
SHORTNESS OF BREATH: 0
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
## 2025-02-21 LAB — VITAMIN D 25-HYDROXY: 16.6 NG/ML (ref 30–100)

## 2025-02-26 ENCOUNTER — HOSPITAL ENCOUNTER (OUTPATIENT)
Dept: PHYSICAL THERAPY | Age: 60
Setting detail: THERAPIES SERIES
Discharge: HOME | End: 2025-02-26
Payer: MEDICAID

## 2025-02-26 DIAGNOSIS — E11.9 TYPE 2 DIABETES MELLITUS WITHOUT COMPLICATION, WITHOUT LONG-TERM CURRENT USE OF INSULIN (HCC): ICD-10-CM

## 2025-02-26 DIAGNOSIS — E55.9 VITAMIN D DEFICIENCY: Primary | ICD-10-CM

## 2025-02-26 PROCEDURE — 97161 PT EVAL LOW COMPLEX 20 MIN: CPT

## 2025-02-26 RX ORDER — AVOBENZONE, HOMOSALATE, OCTISALATE, OCTOCRYLENE 30; 40; 45; 26 MG/ML; MG/ML; MG/ML; MG/ML
1 CREAM TOPICAL 4 TIMES DAILY
Qty: 200 EACH | Refills: 5 | Status: SHIPPED | OUTPATIENT
Start: 2025-02-26

## 2025-02-26 RX ORDER — GLUCOSAMINE HCL/CHONDROITIN SU 500-400 MG
CAPSULE ORAL
Qty: 200 STRIP | Refills: 5 | Status: SHIPPED | OUTPATIENT
Start: 2025-02-26

## 2025-02-26 RX ORDER — ERGOCALCIFEROL 1.25 MG/1
50000 CAPSULE, LIQUID FILLED ORAL WEEKLY
Qty: 12 CAPSULE | Refills: 1 | Status: SHIPPED | OUTPATIENT
Start: 2025-02-26

## 2025-02-26 RX ORDER — UBIQUINOL 100 MG
CAPSULE ORAL
Qty: 200 EACH | Refills: 5 | Status: SHIPPED | OUTPATIENT
Start: 2025-02-26

## 2025-02-26 NOTE — PROGRESS NOTES
[facility]                Phone: [phone]   Fax: [phone]    Physical Therapy  Out Patient Initial Evaluation    Date:  2025    Patient Name:  Kathe Caldera    :  1965  MRN: [id number]    DIAGNOSIS:  Chronic LBP with B/L Sciatica  EVALUATION DATE:  25  REFERRING PHYSICIAN:  Laila Ruiz MD  ONSET DATE:  NA    PROBLEMS FOUND DURING EVALUATION  Constant LBP variable 4-8/10 with LT radicular pain to distal LT lateral calf.  Reports no paresthesiae's LTLE.  Positional LBP standing limiting stand/walk/steps etc.   Inc pain dynamic AROM mobility core/lumbar in flexion 30*, Ext neutral, RT rotation sitting 25* (LT 45*) and B/L SB 15* all planes inc LBP/LTLE pain x LT seated trunk rotations.   + Paraspinal MM spasms lumbar/thoracic limiting trunk/hip ROM.  Antalgic gait cycle lumbar/LT hip/LE closed chain kinetic bipedal increasing with unilateral loading LT on steps.   Dec LT hip MMT 2/2 LBP/LTLE @ hip 3-/5, knee 4-5/5 ext/flex 4/5    SHORT TERM GOALS (2-3 wks)  Reduced frequency and intensity of the LBP/LTLE pain to variable 2-5/10 with centralized LTLE pain to mid thigh or higher.  Imp core/Lumbar ROM all planes 10* or > standing  Imp MMT core 3/5 and LTLE 1 grade globally.  Reduced paraspinal MM spasms improving pain and functional core/lumbar/hip ROM  Indep HEP  Imp gait cycle with SC/QC. Dec LTLE antalgic mid stance loading.     LONG TERM GOALS (3-6 wks)  Pain Lumbar/LTLE int @ 2-3/10   Centralized LTLE radic pain LT gluteal to non-existence.   ROM core/lumbar 75% WNL pain free open chain   Gait cycle with no AD with normalized cycle.  Reciprocal stairs, pain free transfer to stand, normal gait     PATIENT GOALS  No pain low back and LTLE  Not have to go back on WW  Sleep better through night  Able to stand and cook/clean w/o excruciating LBP/LTLE pain    REHAB POTENTIAL:  Fair     RECOMMENDED TREATMENT PLAN TO ACHIEVE THE MUTUAL LONG TERM

## 2025-02-26 NOTE — PROGRESS NOTES
[facility]                Phone: [phone]   Fax: [phone]    Physical Therapy Daily Treatment Note  Date:  2025    Patient Name:  Kathe Caldera    :  1965  MRN: [id number]    Restrictions/Precautions:    Diagnosis:  Chr LBP with B/L Sciatica  Referring Physician:   Laila Ruiz MD   Visit# / total visits: -  Pain level: /10   Time In:  Time Out:    Subjective:      Exercises:  Exercise/Equipment Resistance/Repetitions Other comments                                                                                                                                             Other Therapeutic Activities:      Home Exercise Program:      Manual Treatments:      Modalities:      Treatment/Activity Tolerance:  [] Patient tolerated treatment well [] Patient limited by fatique  [] Patient limited by pain  [] Patient limited by other medical complications  [] Other:     Plan:   [] Continue per plan of care [] Alter current plan (see comments)  [] Plan of care initiated [] Hold pending MD visit [] Discharge       Electronically signed by:  Azeem Grant PT

## 2025-02-28 LAB — NONINV COLON CA DNA+OCC BLD SCRN STL QL: NORMAL

## 2025-03-03 ENCOUNTER — HOSPITAL ENCOUNTER (OUTPATIENT)
Dept: PHYSICAL THERAPY | Age: 60
Setting detail: THERAPIES SERIES
Discharge: HOME | End: 2025-03-03
Payer: MEDICAID

## 2025-03-03 PROCEDURE — 97110 THERAPEUTIC EXERCISES: CPT

## 2025-03-03 NOTE — PROGRESS NOTES
[facility]                Phone: [phone]   Fax: [phone]    Physical Therapy Daily Treatment Note  Date:  3/3/2025    Patient Name:  Kathe Caldera    :  1965  MRN: [id number]    Restrictions/Precautions:    Diagnosis:  Chr LBP with B/L Sciatica  Referring Physician:   Laila Ruiz MD   Visit# / total visits: -  Pain level: /10   Time In: 1:10  Time Out: 1:50    Subjective:  3/7/25: Reports       O: There-ex per grid sheet  Amb w/o any AD normal gait cycle     A/P: See grid comments. 3/3/25 pain at 8/10 LBP with majority LT sided.  Guarded gait cycle trunk.  Pain LT sided low back with   there-ex as above grid.  Inst on HEP focusing on L-ext.  Will see 3x/week x 2-3 weeks to try to dampen LBP; reduce freq to 2x/week @ the 2-3 wks.       Exercises:  Exercise/Equipment Resistance/Repetitions Other comments    Onestep 10'  No diff on/off bike    Seated ball trunk flexion stretch X 10 with 10-15 sec flex trunk holds with ending RT and LT ball drift w/o holds 45* flexion. Reports pain with flexion but more pain with ball drifting to RT with LT sided LBP    LTR  2 x 10 with 5 sec rotational holds Reports more pain with RT rotation in LT side low back    Supine ball squeeze   3 x 10 with 5 sec squeezes Reports no inc LBP LT or RT.  Feels inside legs (hip addoctors)    Hip abd TB supine  BL @ 3 x 10 no holds No c/o inc LT or RT side LBP    Mini bridge Mini say 2\" rise 2x10 Inc LT side LBP with high/mid butt rise and slight inc with a shallow 2\" butt rise.     SLR RT/LT 2 x 10 ea LE No LBP with RT SLR,     Standing ski row with TB      Stand rhomboids with TB      Stand trunk extensions   2 x 10 with 5 sec ext hold. Getting a good 20* extension, inc pressure low back and some LT sided inc LBP,                                                                      Home Exercise Program:  Provided and reviewed    Manual Treatments: NA     Modalities:

## 2025-03-05 ENCOUNTER — TELEPHONE (OUTPATIENT)
Age: 60
End: 2025-03-05

## 2025-03-05 ENCOUNTER — HOSPITAL ENCOUNTER (OUTPATIENT)
Dept: PHYSICAL THERAPY | Age: 60
Setting detail: THERAPIES SERIES
Discharge: HOME | End: 2025-03-05
Payer: MEDICAID

## 2025-03-05 DIAGNOSIS — G89.29 CHRONIC BILATERAL LOW BACK PAIN WITH BILATERAL SCIATICA: Primary | ICD-10-CM

## 2025-03-05 DIAGNOSIS — M54.16 LUMBAR RADICULOPATHY: ICD-10-CM

## 2025-03-05 DIAGNOSIS — M54.42 CHRONIC BILATERAL LOW BACK PAIN WITH BILATERAL SCIATICA: Primary | ICD-10-CM

## 2025-03-05 DIAGNOSIS — M54.41 CHRONIC BILATERAL LOW BACK PAIN WITH BILATERAL SCIATICA: Primary | ICD-10-CM

## 2025-03-05 PROCEDURE — 97110 THERAPEUTIC EXERCISES: CPT

## 2025-03-05 NOTE — PROGRESS NOTES
and reviewed    Manual Treatments: NA     Modalities:  MH sitting x 15 with 4 towel barrier    Treatment/Activity Tolerance:  [x] Patient tolerated treatment well    [] Patient limited by fatique  [x] Patient limited by pain/LT low back  [] Patient limited by other medical complications  [] Other:     Plan:   [x] Continue per plan of care [] Alter current plan (see comments)  [] Plan of care initiated [] Hold pending MD visit [] Discharge       Electronically signed by:  Azeem Grant PT

## 2025-03-05 NOTE — TELEPHONE ENCOUNTER
MRI of her back is needed per PT for her sciatica. Doctor has to place order. Let me know once order is in to let pt know.

## 2025-03-07 ENCOUNTER — APPOINTMENT (OUTPATIENT)
Dept: PHYSICAL THERAPY | Age: 60
End: 2025-03-07
Payer: MEDICAID

## 2025-03-12 ENCOUNTER — HOSPITAL ENCOUNTER (OUTPATIENT)
Dept: PHYSICAL THERAPY | Age: 60
Setting detail: THERAPIES SERIES
Discharge: HOME | End: 2025-03-12
Payer: MEDICAID

## 2025-03-12 PROCEDURE — 97110 THERAPEUTIC EXERCISES: CPT

## 2025-03-12 NOTE — PROGRESS NOTES
squeeze Inc LT side LBP with high/mid butt rise and slight inc with a shallow 2\" butt rise.     SLR RT/LT 3 x 10 ea LE No LBP with RT SLR,     Standing ski row with TB 2 x 10 BL     Stand rhomboids with TB      Stand trunk extensions   2 x 10 with 5 sec ext hold. Getting a good 20* extension, inc pressure low back and some LT sided inc LBP,     Prone static positioning   Prone on elbows static x 2' Tough but she made it, Looks miserable getting up from prone and into standing,                                                              Home Exercise Program:  Provided and reviewed    Manual Treatments: NA     Modalities:  Treatment/Activity Tolerance:  [x] Patient tolerated treatment well    [] Patient limited by fatique  [x] Patient limited by pain/LT low back  [] Patient limited by other medical complications  [] Other:     Plan:   [x] Continue per plan of care [] Alter current plan (see comments)  [] Plan of care initiated [] Hold pending MD visit [] Discharge       Electronically signed by:  Azeem Grant, PT

## 2025-03-14 ENCOUNTER — APPOINTMENT (OUTPATIENT)
Dept: PHYSICAL THERAPY | Age: 60
End: 2025-03-14
Payer: MEDICAID

## 2025-03-14 ENCOUNTER — HOSPITAL ENCOUNTER (OUTPATIENT)
Dept: PHYSICAL THERAPY | Age: 60
Setting detail: THERAPIES SERIES
Discharge: HOME | End: 2025-03-14
Payer: MEDICAID

## 2025-03-14 PROCEDURE — 97110 THERAPEUTIC EXERCISES: CPT

## 2025-03-14 NOTE — PROGRESS NOTES
[facility]                Phone: [phone]   Fax: [phone]    Physical Therapy Daily Treatment Note  Date:  3/14/2025    Patient Name:  Kathe Caldera    :  1965  MRN: [id number]    Restrictions/Precautions:    Diagnosis:  Chr LBP with B/L Sciatica  Referring Physician:   Laila Ruiz MD   Visit# / total visits:   Pain level: /10   Time In: 12:20  Time Out: 1:15 (4)    Subjective:  3/12/25: Reports got MRI ordered for 25 and Dr Martinez on 25.  Doing HEP as much as possible. Reports same pain from   beginning but maybe a \"Bit better\".  Pain is unilateral LTLE to LT calf and pain down goes away or most gone with sitting down.  Walking and just   static standing much worse than walking. Reports \"I just want to walk again w/o this severe pain or stand at kitchen counter and cook\".     O: There-ex per grid sheet  Amb w/o any AD normal gait cycle     A/P: See grid comments.  Continued guarded gait cycle trunk but slightly better with better pace.  Pain LT sided low back with   there-ex as above grid.  Inst on HEP focusing on Lumb-ext.   Cont work on ext lumbar series.      3/7/25: Reports got scheduled visit to Dr Martinez on .        Exercises:  Exercise/Equipment Resistance/Repetitions Other comments    Onestep 10'  No diff on/off bike    Seated ball trunk flexion stretch X 20 with 10-15 sec flex trunk holds with ending RT and LT ball drift w/o holds 45* flexion. Reports pain with flexion but more pain with ball drifting to RT with LT sided LBP    LTR  3 x 10 with 5 sec rotational holds Reports more pain with RT rotation in LT side low back, 3/12/25:reports less pulling/pain LT side trunk with the RT LTR    Supine ball squeeze   3 x 10 with 3-5 sec squeezes Reports no inc LBP LT or RT.  Feels inside legs (hip addoctors)    Hip abd TB supine  Purp @ 4 x 10 no holds No c/o inc LT or RT side LBP    Mini bridge Mini rise 3 x 10 with glut

## 2025-03-17 ENCOUNTER — HOSPITAL ENCOUNTER (OUTPATIENT)
Dept: PHYSICAL THERAPY | Age: 60
Setting detail: THERAPIES SERIES
Discharge: HOME | End: 2025-03-17
Payer: MEDICAID

## 2025-03-17 PROCEDURE — 97110 THERAPEUTIC EXERCISES: CPT

## 2025-03-17 NOTE — PROGRESS NOTES
LBP    Mini bridge Mini rise 3 x 10 with again hip abd with Purp TB then a glut squeeze in the bridge  Less Inc LT side LBP with high/mid butt rise now.      SLR RT/LT 3 x 10 ea LE No LBP with RT SLR,     Standing ski row with TB 2 x 10 BL     Stand rhomboids with TB      Stand trunk extensions   2 x 10 with 5 sec ext hold. Getting a good 20* extension, inc pressure low back and some LT sided inc LBP,     Prone static positioning   Prone on elbows static x 4' Tough but she made it, Looks miserable getting up from prone and into standing,     Prone press ups   X 10 @ 5 sec ext holds                                                       Home Exercise Program:  Provided and reviewed    Manual Treatments: NA     Modalities:   Treatment/Activity Tolerance:  [x] Patient tolerated treatment well    [] Patient limited by fatique  [x] Patient limited by pain/LT low back  [] Patient limited by other medical complications  [] Other:     Plan:   [x] Continue per plan of care [] Alter current plan (see comments)  [] Plan of care initiated [] Hold pending MD visit [] Discharge       Electronically signed by:  Azeem Grant, PT

## 2025-03-19 ENCOUNTER — HOSPITAL ENCOUNTER (OUTPATIENT)
Dept: PHYSICAL THERAPY | Age: 60
Setting detail: THERAPIES SERIES
Discharge: HOME | End: 2025-03-19
Payer: MEDICAID

## 2025-03-19 ENCOUNTER — TRANSCRIBE ORDERS (OUTPATIENT)
Dept: ADMINISTRATIVE | Age: 60
End: 2025-03-19

## 2025-03-19 DIAGNOSIS — M19.072 ARTHRITIS OF LEFT ANKLE: Primary | ICD-10-CM

## 2025-03-19 NOTE — PROGRESS NOTES
[facility]                Phone: [phone]  Fax: [phone]    Physical Therapy  Cancellation/No-show Note  Patient Name:  Kathe Caldera  :  1965   Date:  3/19/2025    For today's appointment patient:  []  Cancelled  []  Rescheduled appointment  [x]  No-show     Reason given by patient:  []  Patient ill  []  Conflicting appointment  []  No transportation    []  Conflict with work  []  No reason given  []  Other:     Comments:      Electronically signed by:  Azeem Grant, PT   appt set

## 2025-03-21 ENCOUNTER — HOSPITAL ENCOUNTER (OUTPATIENT)
Dept: PHYSICAL THERAPY | Age: 60
Setting detail: THERAPIES SERIES
Discharge: HOME | End: 2025-03-21
Payer: MEDICAID

## 2025-03-21 NOTE — PROGRESS NOTES
[facility]                Phone: [phone]  Fax: [phone]    Physical Therapy  Cancellation/No-show Note  Patient Name:  Kathe Caldera  :  1965   Date:  3/21/2025    For today's appointment patient:  []  Cancelled  []  Rescheduled appointment  [x]  No-show     Reason given by patient:  []  Patient ill  []  Conflicting appointment  []  No transportation    []  Conflict with work  []  No reason given  []  Other:     Comments:      Electronically signed by:  Azeem Grant, PT

## 2025-03-24 ENCOUNTER — HOSPITAL ENCOUNTER (OUTPATIENT)
Dept: PHYSICAL THERAPY | Age: 60
Setting detail: THERAPIES SERIES
Discharge: HOME | End: 2025-03-24
Payer: MEDICAID

## 2025-03-24 NOTE — PROGRESS NOTES
[facility]                Phone: [phone]  Fax: [phone]    Physical Therapy  Cancellation/No-show Note  Patient Name:  Kathe Caldera  :  1965   Date:  3/24/2025    For today's appointment patient:  []  Cancelled  []  Rescheduled appointment  [x]  No-show     Reason given by patient:  []  Patient ill  []  Conflicting appointment  []  No transportation    []  Conflict with work  []  No reason given  []  Other:     Comments:      Electronically signed by:  Azeem Grant, PT

## 2025-03-26 ENCOUNTER — HOSPITAL ENCOUNTER (OUTPATIENT)
Dept: PHYSICAL THERAPY | Age: 60
Setting detail: THERAPIES SERIES
Discharge: HOME | End: 2025-03-26
Payer: MEDICAID

## 2025-03-26 NOTE — PROGRESS NOTES
[facility]                Phone: [phone]  Fax: [phone]    Physical Therapy  Cancellation/No-show Note  Patient Name:  Kathe Caldera  :  1965   Date:  3/26/2025    For today's appointment patient:  []  Cancelled  []  Rescheduled appointment  [x]  No-show     Reason given by patient:  []  Patient ill  []  Conflicting appointment  []  No transportation    []  Conflict with work  []  No reason given  []  Other:     Comments:      Electronically signed by:  Azeem Grant, PT

## 2025-03-28 ENCOUNTER — HOSPITAL ENCOUNTER (OUTPATIENT)
Dept: PHYSICAL THERAPY | Age: 60
Setting detail: THERAPIES SERIES
Discharge: HOME | End: 2025-03-28
Payer: MEDICAID

## 2025-03-28 NOTE — PROGRESS NOTES
[facility]                Phone: [phone]  Fax: [phone]    Physical Therapy  Cancellation/No-show Note  Patient Name:  Kathe Caldera  :  1965   Date:  3/28/2025    For today's appointment patient:  []  Cancelled  []  Rescheduled appointment  [x]  No-show     Reason given by patient:  []  Patient ill  []  Conflicting appointment  []  No transportation    []  Conflict with work  []  No reason given  []  Other:     Comments:  Will D/C 2/2 NS status since 3/17/25    Electronically signed by:  Azeem Grant, PT

## 2025-04-08 ENCOUNTER — HOSPITAL ENCOUNTER (OUTPATIENT)
Dept: MRI IMAGING | Age: 60
Discharge: HOME | End: 2025-04-10
Attending: FAMILY MEDICINE
Payer: MEDICAID

## 2025-04-08 DIAGNOSIS — M54.16 LUMBAR RADICULOPATHY: ICD-10-CM

## 2025-04-08 DIAGNOSIS — M54.42 CHRONIC BILATERAL LOW BACK PAIN WITH BILATERAL SCIATICA: ICD-10-CM

## 2025-04-08 DIAGNOSIS — M54.41 CHRONIC BILATERAL LOW BACK PAIN WITH BILATERAL SCIATICA: ICD-10-CM

## 2025-04-08 DIAGNOSIS — G89.29 CHRONIC BILATERAL LOW BACK PAIN WITH BILATERAL SCIATICA: ICD-10-CM

## 2025-04-08 PROCEDURE — 72148 MRI LUMBAR SPINE W/O DYE: CPT

## 2025-04-10 ENCOUNTER — HOSPITAL ENCOUNTER (OUTPATIENT)
Dept: CT IMAGING | Age: 60
Discharge: HOME | End: 2025-04-12
Attending: STUDENT IN AN ORGANIZED HEALTH CARE EDUCATION/TRAINING PROGRAM
Payer: MEDICAID

## 2025-04-10 DIAGNOSIS — M19.072 ARTHRITIS OF LEFT ANKLE: ICD-10-CM

## 2025-04-10 PROCEDURE — 73700 CT LOWER EXTREMITY W/O DYE: CPT

## 2025-04-11 ENCOUNTER — RESULTS FOLLOW-UP (OUTPATIENT)
Age: 60
End: 2025-04-11

## 2025-04-11 DIAGNOSIS — M48.061 LUMBAR FORAMINAL STENOSIS: Primary | ICD-10-CM

## 2025-04-11 DIAGNOSIS — M51.362 DEGENERATION OF INTERVERTEBRAL DISC OF LUMBAR REGION WITH DISCOGENIC BACK PAIN AND LOWER EXTREMITY PAIN: ICD-10-CM

## 2025-04-17 ENCOUNTER — OFFICE VISIT (OUTPATIENT)
Dept: NEUROSURGERY | Age: 60
End: 2025-04-17
Payer: MEDICAID

## 2025-04-17 VITALS
DIASTOLIC BLOOD PRESSURE: 77 MMHG | BODY MASS INDEX: 39.81 KG/M2 | WEIGHT: 268.8 LBS | SYSTOLIC BLOOD PRESSURE: 110 MMHG | HEIGHT: 69 IN

## 2025-04-17 DIAGNOSIS — M43.16 SPONDYLOLISTHESIS OF LUMBAR REGION: ICD-10-CM

## 2025-04-17 DIAGNOSIS — M48.062 SPINAL STENOSIS OF LUMBAR REGION WITH NEUROGENIC CLAUDICATION: Primary | ICD-10-CM

## 2025-04-17 PROCEDURE — 3078F DIAST BP <80 MM HG: CPT | Performed by: PHYSICIAN ASSISTANT

## 2025-04-17 PROCEDURE — 99204 OFFICE O/P NEW MOD 45 MIN: CPT | Performed by: PHYSICIAN ASSISTANT

## 2025-04-17 PROCEDURE — G8427 DOCREV CUR MEDS BY ELIG CLIN: HCPCS | Performed by: PHYSICIAN ASSISTANT

## 2025-04-17 PROCEDURE — 3017F COLORECTAL CA SCREEN DOC REV: CPT | Performed by: PHYSICIAN ASSISTANT

## 2025-04-17 PROCEDURE — 4004F PT TOBACCO SCREEN RCVD TLK: CPT | Performed by: PHYSICIAN ASSISTANT

## 2025-04-17 PROCEDURE — 3074F SYST BP LT 130 MM HG: CPT | Performed by: PHYSICIAN ASSISTANT

## 2025-04-17 PROCEDURE — G8417 CALC BMI ABV UP PARAM F/U: HCPCS | Performed by: PHYSICIAN ASSISTANT

## 2025-04-17 RX ORDER — ZOLPIDEM TARTRATE 5 MG/1
5 TABLET ORAL NIGHTLY PRN
COMMUNITY
Start: 2025-04-07

## 2025-04-17 RX ORDER — PSEUDOEPHEDRINE HCL 30 MG
TABLET ORAL
COMMUNITY
Start: 2024-11-01

## 2025-04-17 RX ORDER — TOPIRAMATE 50 MG/1
50 TABLET, FILM COATED ORAL 2 TIMES DAILY
COMMUNITY
Start: 2025-03-21

## 2025-04-17 RX ORDER — GABAPENTIN 300 MG/1
300 CAPSULE ORAL 3 TIMES DAILY
Qty: 90 CAPSULE | Refills: 2 | Status: SHIPPED | OUTPATIENT
Start: 2025-04-17 | End: 2025-07-16

## 2025-04-17 ASSESSMENT — ENCOUNTER SYMPTOMS
TROUBLE SWALLOWING: 0
BACK PAIN: 1
PHOTOPHOBIA: 0
SHORTNESS OF BREATH: 0
ABDOMINAL PAIN: 0

## 2025-04-17 NOTE — PROGRESS NOTES
[facility]      Subjective:  Kathe Caldera is a 59 year old female presenting to the office today as a new patient c/o low back pain with radiation down her left leg. Describes the pain as sharp, stabbing, and shooting. Pain is chronic, but has been worsening the past several months. Standing and walking for long periods of time makes the pain worse. She has tried recent physical therapy without significant relief. Denies ADE. Denies loss of bowel or bladder, saddle anesthesia, numbness, tingling, headache, loss of dexterity,  fever, chills, N/V, SOB, or chest pain.    Of note, patient smokes 1/2ppd    Review of Systems   Constitutional:  Negative for fever and unexpected weight change.   HENT:  Negative for trouble swallowing.    Eyes:  Negative for photophobia and visual disturbance.   Respiratory:  Negative for shortness of breath.    Cardiovascular:  Negative for chest pain.   Gastrointestinal:  Negative for abdominal pain.   Endocrine: Negative for heat intolerance.   Genitourinary:  Negative for flank pain.   Musculoskeletal:  Positive for back pain and gait problem. Negative for myalgias and neck pain.   Skin:  Negative for wound.   Neurological:  Positive for weakness. Negative for numbness and headaches.   Psychiatric/Behavioral:  Negative for confusion.        Objective:  Vitals:    04/17/25 0923   BP: 110/77       Physical Exam  Constitutional:       Appearance: Normal appearance. She is well-developed.   HENT:      Head: Normocephalic and atraumatic.   Eyes:      Extraocular Movements: Extraocular movements intact.      Conjunctiva/sclera: Conjunctivae normal.      Pupils: Pupils are equal, round, and reactive to light.   Cardiovascular:      Rate and Rhythm: Normal rate.   Pulmonary:      Effort: Pulmonary effort is normal.   Abdominal:      General: There is no distension.   Musculoskeletal:      Cervical back: Normal range of motion and neck supple.
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
## 2025-05-08 ENCOUNTER — OFFICE VISIT (OUTPATIENT)
Age: 60
End: 2025-05-08
Payer: MEDICAID

## 2025-05-08 VITALS
SYSTOLIC BLOOD PRESSURE: 136 MMHG | WEIGHT: 268 LBS | HEART RATE: 103 BPM | TEMPERATURE: 96.9 F | DIASTOLIC BLOOD PRESSURE: 76 MMHG | HEIGHT: 70 IN | OXYGEN SATURATION: 95 % | BODY MASS INDEX: 38.37 KG/M2

## 2025-05-08 DIAGNOSIS — M47.816 LUMBAR SPONDYLOSIS: ICD-10-CM

## 2025-05-08 DIAGNOSIS — M48.062 SPINAL STENOSIS OF LUMBAR REGION WITH NEUROGENIC CLAUDICATION: Primary | ICD-10-CM

## 2025-05-08 DIAGNOSIS — M43.16 SPONDYLOLISTHESIS OF LUMBAR REGION: ICD-10-CM

## 2025-05-08 DIAGNOSIS — F19.11 H/O: SUBSTANCE ABUSE (HCC): ICD-10-CM

## 2025-05-08 PROCEDURE — 3078F DIAST BP <80 MM HG: CPT | Performed by: STUDENT IN AN ORGANIZED HEALTH CARE EDUCATION/TRAINING PROGRAM

## 2025-05-08 PROCEDURE — 99204 OFFICE O/P NEW MOD 45 MIN: CPT | Performed by: STUDENT IN AN ORGANIZED HEALTH CARE EDUCATION/TRAINING PROGRAM

## 2025-05-08 PROCEDURE — 3075F SYST BP GE 130 - 139MM HG: CPT | Performed by: STUDENT IN AN ORGANIZED HEALTH CARE EDUCATION/TRAINING PROGRAM

## 2025-05-08 PROCEDURE — G8427 DOCREV CUR MEDS BY ELIG CLIN: HCPCS | Performed by: STUDENT IN AN ORGANIZED HEALTH CARE EDUCATION/TRAINING PROGRAM

## 2025-05-08 PROCEDURE — 3017F COLORECTAL CA SCREEN DOC REV: CPT | Performed by: STUDENT IN AN ORGANIZED HEALTH CARE EDUCATION/TRAINING PROGRAM

## 2025-05-08 PROCEDURE — G8417 CALC BMI ABV UP PARAM F/U: HCPCS | Performed by: STUDENT IN AN ORGANIZED HEALTH CARE EDUCATION/TRAINING PROGRAM

## 2025-05-08 PROCEDURE — 4004F PT TOBACCO SCREEN RCVD TLK: CPT | Performed by: STUDENT IN AN ORGANIZED HEALTH CARE EDUCATION/TRAINING PROGRAM

## 2025-05-08 RX ORDER — SODIUM CHLORIDE 0.9 % (FLUSH) 0.9 %
5-40 SYRINGE (ML) INJECTION PRN
OUTPATIENT
Start: 2025-05-08

## 2025-05-08 RX ORDER — SODIUM CHLORIDE 9 MG/ML
INJECTION, SOLUTION INTRAVENOUS PRN
OUTPATIENT
Start: 2025-05-08

## 2025-05-08 RX ORDER — SODIUM CHLORIDE 0.9 % (FLUSH) 0.9 %
5-40 SYRINGE (ML) INJECTION EVERY 12 HOURS SCHEDULED
OUTPATIENT
Start: 2025-05-08
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
## 2025-05-08 NOTE — PROGRESS NOTES
Patient:  Kathe Caldera,  1965  Date of Service:  25      Patient presents to [facility] with complaints of lower back pain     Pain:  Location: lower back  Inciting Factor: n/a  Duration: 6 months  Description: constant  Quality: aching.    Level (worst): 10/10  Radiation:  yes - down left leg  Numbness/Tingling: no  Aggravating factors: walking, standing.   Alleviating factors: rest.    Blood Thinners/Anticoagulation: Aspirin   If so, managed by: Laila Ruiz MD   Herbal Supplements: no    Medications:    NSAID's :   n/a  Tylenol: Yes   Patches/Gels:   lidocaine patches (Lidoderm)  Membrane stabilizers :   Current -  gabapentin (NEURONTIN)  Previous -  n/a  Opioids :   Current -  n/a   Previous -   n/a   Muscle Relaxants:   tizanidine (Zanaflex)  Steroids: no   Benzodiazepines:  buspirone (Buspar)  Anti-depres/Anti-Pscyh: Abilify and Ambien   Adjuvants or Others : no      Previous treatments:    Physical Therapy : Yes    Chiropractic treatment: No   TENS Unit: No   Previous Pain Physicians: no   Previous Procedure: no     Do you want someone present when the provider examines you? No    /76   Pulse (!) 103   Temp 96.9 °F (36.1 °C)   Ht 1.765 m (5' 9.5\")   Wt 121.6 kg (268 lb)   SpO2 95%   BMI 39.01 kg/m²     No LMP recorded. Patient has had an ablation.

## 2025-05-12 ENCOUNTER — TELEPHONE (OUTPATIENT)
Age: 60
End: 2025-05-12

## 2025-05-15 ENCOUNTER — TELEPHONE (OUTPATIENT)
Age: 60
End: 2025-05-15

## 2025-05-16 ENCOUNTER — OFFICE VISIT (OUTPATIENT)
Age: 60
End: 2025-05-16
Payer: MEDICAID

## 2025-05-16 VITALS
TEMPERATURE: 97.2 F | OXYGEN SATURATION: 96 % | HEIGHT: 70 IN | WEIGHT: 266 LBS | HEART RATE: 90 BPM | SYSTOLIC BLOOD PRESSURE: 108 MMHG | BODY MASS INDEX: 38.08 KG/M2 | DIASTOLIC BLOOD PRESSURE: 68 MMHG | RESPIRATION RATE: 18 BRPM

## 2025-05-16 DIAGNOSIS — Z12.31 BREAST CANCER SCREENING BY MAMMOGRAM: ICD-10-CM

## 2025-05-16 DIAGNOSIS — I10 PRIMARY HYPERTENSION: ICD-10-CM

## 2025-05-16 DIAGNOSIS — E66.812 CLASS 2 OBESITY DUE TO EXCESS CALORIES WITHOUT SERIOUS COMORBIDITY WITH BODY MASS INDEX (BMI) OF 38.0 TO 38.9 IN ADULT: ICD-10-CM

## 2025-05-16 DIAGNOSIS — E11.9 TYPE 2 DIABETES MELLITUS WITHOUT COMPLICATION, WITHOUT LONG-TERM CURRENT USE OF INSULIN (HCC): Primary | ICD-10-CM

## 2025-05-16 DIAGNOSIS — Z12.12 SCREENING FOR COLORECTAL CANCER: ICD-10-CM

## 2025-05-16 DIAGNOSIS — K21.9 GASTROESOPHAGEAL REFLUX DISEASE WITHOUT ESOPHAGITIS: ICD-10-CM

## 2025-05-16 DIAGNOSIS — E66.09 CLASS 2 OBESITY DUE TO EXCESS CALORIES WITHOUT SERIOUS COMORBIDITY WITH BODY MASS INDEX (BMI) OF 38.0 TO 38.9 IN ADULT: ICD-10-CM

## 2025-05-16 DIAGNOSIS — E78.2 MIXED HYPERLIPIDEMIA: ICD-10-CM

## 2025-05-16 DIAGNOSIS — R26.2 UNABLE TO AMBULATE: ICD-10-CM

## 2025-05-16 DIAGNOSIS — Z12.11 SCREENING FOR COLORECTAL CANCER: ICD-10-CM

## 2025-05-16 LAB — HBA1C MFR BLD: 7 %

## 2025-05-16 PROCEDURE — 3017F COLORECTAL CA SCREEN DOC REV: CPT | Performed by: FAMILY MEDICINE

## 2025-05-16 PROCEDURE — G8427 DOCREV CUR MEDS BY ELIG CLIN: HCPCS | Performed by: FAMILY MEDICINE

## 2025-05-16 PROCEDURE — 3074F SYST BP LT 130 MM HG: CPT | Performed by: FAMILY MEDICINE

## 2025-05-16 PROCEDURE — G8417 CALC BMI ABV UP PARAM F/U: HCPCS | Performed by: FAMILY MEDICINE

## 2025-05-16 PROCEDURE — 2022F DILAT RTA XM EVC RTNOPTHY: CPT | Performed by: FAMILY MEDICINE

## 2025-05-16 PROCEDURE — 99214 OFFICE O/P EST MOD 30 MIN: CPT | Performed by: FAMILY MEDICINE

## 2025-05-16 PROCEDURE — 83036 HEMOGLOBIN GLYCOSYLATED A1C: CPT | Performed by: FAMILY MEDICINE

## 2025-05-16 PROCEDURE — 3078F DIAST BP <80 MM HG: CPT | Performed by: FAMILY MEDICINE

## 2025-05-16 PROCEDURE — 3051F HG A1C>EQUAL 7.0%<8.0%: CPT | Performed by: FAMILY MEDICINE

## 2025-05-16 PROCEDURE — 4004F PT TOBACCO SCREEN RCVD TLK: CPT | Performed by: FAMILY MEDICINE

## 2025-05-16 RX ORDER — BENZTROPINE MESYLATE 1 MG/1
TABLET ORAL
COMMUNITY
Start: 2025-05-12

## 2025-05-16 RX ORDER — VIT C/E/ZN/COPPR/LUTEIN/ZEAXAN 250MG-90MG
CAPSULE ORAL
COMMUNITY
Start: 2025-04-18

## 2025-05-16 RX ORDER — OXYMETAZOLINE HYDROCHLORIDE 0.05 G/100ML
SPRAY NASAL
COMMUNITY
Start: 2024-11-25

## 2025-05-16 RX ORDER — PANTOPRAZOLE SODIUM 40 MG/1
40 TABLET, DELAYED RELEASE ORAL DAILY
Qty: 90 TABLET | Refills: 3 | Status: SHIPPED | OUTPATIENT
Start: 2025-05-16

## 2025-05-16 RX ORDER — QUETIAPINE FUMARATE 200 MG/1
TABLET, FILM COATED ORAL
COMMUNITY
Start: 2024-11-01

## 2025-05-16 RX ORDER — TRAZODONE HYDROCHLORIDE 300 MG/1
TABLET ORAL
COMMUNITY
Start: 2025-04-18

## 2025-05-16 RX ORDER — AMLODIPINE BESYLATE 10 MG/1
10 TABLET ORAL DAILY
Qty: 90 TABLET | Refills: 3 | Status: SHIPPED | OUTPATIENT
Start: 2025-05-16

## 2025-05-16 RX ORDER — ATORVASTATIN CALCIUM 40 MG/1
40 TABLET, FILM COATED ORAL DAILY
Qty: 90 TABLET | Refills: 3 | Status: SHIPPED | OUTPATIENT
Start: 2025-05-16

## 2025-05-16 NOTE — PROGRESS NOTES
CC: Pre-op Exam      HPI:  Kathe Caldera is a 59 y.o. yo female presents for a preoperative consultation at the request of surgeon, Dr. Cruz, who plans on performing Lt Ankle: bone debridement- removal of hardware-IM Nail-subtalar joint athrodesis on TBD at [facility].    Pre-Operative Risk assessment using 2014 ACC/AHA guidelines for non-cardiac surgery  Emergent procedure: No    Active Cardiac Condition: No   Risk Level of Procedure:   [] Low (0-1% of adverse cardiac events): superficial procedures, cataract/ breast surgery, endoscopy, superficial skin, ambulatory procedures.  [x] Intermediate (1-5%): carotid endarterectomy, intraperitoneal/intrathoracic surgery, orthopedic surgery, head and neck surgery, and prostate surgery.   [] High (> 5%): vascular or aortic repair surgery.  Measurement of Exercise Tolerance before Surgery >4 without symptoms: Yes . Pt can Walk indoors, such as around the house (1.75 METs), Do light work around the house, such as dusting or washing dishes (2.70 METs), Take care of self, that is eating, dressing, bathing, using the toilet (2.75 METs), Walk a block or two on level ground (2.75 METs), and Do moderate work around the house such as vacuuming, sweeping floors, or carrying in groceries (3.50 METs).   Assessment of Risk using RCRI (Revised Cardiac Risk Index) factors:   [] High-risk surgery   [] Ischemic heart disease   [] Hx of cerebrovascular disease  [] Hx of Heart failure  [] DM requiring insulin   [] Preop Cr > 2.0 mg/dL    Other items of interest:  Planned anesthesia: LMAC; Known anesthesia problems: None   Bleeding risk: No recent or remote history of abnormal bleeding  Personal or FH of DVT/PE: No    Patient objection to receiving blood products: No  Planned overnight hospital stay: No   Hx of stent placement and need for continued DAPT or antiplatelet agent: No   BP controlled: Yes - last A1C 7.4   DMII, if present, controlled: No   Lung disease, if present,

## 2025-06-13 ENCOUNTER — HOSPITAL ENCOUNTER (OUTPATIENT)
Age: 60
Discharge: HOME | End: 2025-06-13
Payer: MEDICAID

## 2025-06-13 LAB — HBA1C MFR BLD: 6.9 % (ref 4–5.6)

## 2025-06-13 PROCEDURE — 36415 COLL VENOUS BLD VENIPUNCTURE: CPT

## 2025-06-13 PROCEDURE — 83036 HEMOGLOBIN GLYCOSYLATED A1C: CPT

## 2025-07-31 ENCOUNTER — TELEPHONE (OUTPATIENT)
Dept: ADMINISTRATIVE | Age: 60
End: 2025-07-31

## 2025-07-31 ENCOUNTER — OFFICE VISIT (OUTPATIENT)
Dept: FAMILY MEDICINE CLINIC | Age: 60
End: 2025-07-31
Payer: MEDICAID

## 2025-07-31 VITALS
BODY MASS INDEX: 36.08 KG/M2 | TEMPERATURE: 97.3 F | RESPIRATION RATE: 20 BRPM | HEIGHT: 70 IN | WEIGHT: 252 LBS | OXYGEN SATURATION: 95 % | HEART RATE: 100 BPM | DIASTOLIC BLOOD PRESSURE: 72 MMHG | SYSTOLIC BLOOD PRESSURE: 118 MMHG

## 2025-07-31 DIAGNOSIS — J44.9 CHRONIC OBSTRUCTIVE PULMONARY DISEASE, UNSPECIFIED COPD TYPE (HCC): ICD-10-CM

## 2025-07-31 DIAGNOSIS — Z01.818 PRE-OP EXAM: ICD-10-CM

## 2025-07-31 DIAGNOSIS — J30.2 SEASONAL ALLERGIES: ICD-10-CM

## 2025-07-31 DIAGNOSIS — I50.32 CHRONIC HEART FAILURE WITH PRESERVED EJECTION FRACTION (HCC): ICD-10-CM

## 2025-07-31 DIAGNOSIS — Z87.898 HISTORY OF PALPITATIONS: ICD-10-CM

## 2025-07-31 DIAGNOSIS — G89.29 CHRONIC PAIN OF LEFT ANKLE: Primary | ICD-10-CM

## 2025-07-31 DIAGNOSIS — M25.572 CHRONIC PAIN OF LEFT ANKLE: Primary | ICD-10-CM

## 2025-07-31 PROBLEM — F14.21 HISTORY OF COCAINE DEPENDENCE (HCC): Status: RESOLVED | Noted: 2022-10-12 | Resolved: 2025-07-31

## 2025-07-31 LAB
ANION GAP SERPL CALCULATED.3IONS-SCNC: 13 MMOL/L (ref 7–16)
BASOPHILS ABSOLUTE: 0.03 K/UL (ref 0–0.2)
BASOPHILS RELATIVE PERCENT: 0 % (ref 0–2)
BUN BLDV-MCNC: 7 MG/DL (ref 6–20)
CALCIUM SERPL-MCNC: 9.3 MG/DL (ref 8.6–10)
CHLORIDE BLD-SCNC: 106 MMOL/L (ref 98–107)
CO2: 20 MMOL/L (ref 22–29)
CREAT SERPL-MCNC: 1.1 MG/DL (ref 0.5–1)
EOSINOPHILS ABSOLUTE: 0.14 K/UL (ref 0.05–0.5)
EOSINOPHILS RELATIVE PERCENT: 1 % (ref 0–6)
GFR, ESTIMATED: 60 ML/MIN/1.73M2
GLUCOSE BLD-MCNC: 124 MG/DL (ref 74–99)
HCT VFR BLD CALC: 40.4 % (ref 34–48)
HEMOGLOBIN: 12.8 G/DL (ref 11.5–15.5)
IMMATURE GRANULOCYTES %: 0 % (ref 0–5)
IMMATURE GRANULOCYTES ABSOLUTE: 0.03 K/UL (ref 0–0.58)
LYMPHOCYTES ABSOLUTE: 3.23 K/UL (ref 1.5–4)
LYMPHOCYTES RELATIVE PERCENT: 32 % (ref 20–42)
MCH RBC QN AUTO: 24.3 PG (ref 26–35)
MCHC RBC AUTO-ENTMCNC: 31.7 G/DL (ref 32–34.5)
MCV RBC AUTO: 76.7 FL (ref 80–99.9)
MONOCYTES ABSOLUTE: 0.67 K/UL (ref 0.1–0.95)
MONOCYTES RELATIVE PERCENT: 7 % (ref 2–12)
NEUTROPHILS ABSOLUTE: 6.15 K/UL (ref 1.8–7.3)
NEUTROPHILS RELATIVE PERCENT: 60 % (ref 43–80)
NT PRO BNP: <36 PG/ML (ref 0–125)
PDW BLD-RTO: 16.6 % (ref 11.5–15)
PLATELET # BLD: 430 K/UL (ref 130–450)
PMV BLD AUTO: 10.2 FL (ref 7–12)
POTASSIUM SERPL-SCNC: 4.4 MMOL/L (ref 3.5–5.1)
RBC # BLD: 5.27 M/UL (ref 3.5–5.5)
SODIUM BLD-SCNC: 138 MMOL/L (ref 136–145)
WBC # BLD: 10.3 K/UL (ref 4.5–11.5)

## 2025-07-31 PROCEDURE — 4004F PT TOBACCO SCREEN RCVD TLK: CPT | Performed by: STUDENT IN AN ORGANIZED HEALTH CARE EDUCATION/TRAINING PROGRAM

## 2025-07-31 PROCEDURE — 93000 ELECTROCARDIOGRAM COMPLETE: CPT | Performed by: STUDENT IN AN ORGANIZED HEALTH CARE EDUCATION/TRAINING PROGRAM

## 2025-07-31 PROCEDURE — 3074F SYST BP LT 130 MM HG: CPT | Performed by: STUDENT IN AN ORGANIZED HEALTH CARE EDUCATION/TRAINING PROGRAM

## 2025-07-31 PROCEDURE — G8417 CALC BMI ABV UP PARAM F/U: HCPCS | Performed by: STUDENT IN AN ORGANIZED HEALTH CARE EDUCATION/TRAINING PROGRAM

## 2025-07-31 PROCEDURE — 3078F DIAST BP <80 MM HG: CPT | Performed by: STUDENT IN AN ORGANIZED HEALTH CARE EDUCATION/TRAINING PROGRAM

## 2025-07-31 PROCEDURE — 99214 OFFICE O/P EST MOD 30 MIN: CPT | Performed by: STUDENT IN AN ORGANIZED HEALTH CARE EDUCATION/TRAINING PROGRAM

## 2025-07-31 PROCEDURE — 3017F COLORECTAL CA SCREEN DOC REV: CPT | Performed by: STUDENT IN AN ORGANIZED HEALTH CARE EDUCATION/TRAINING PROGRAM

## 2025-07-31 PROCEDURE — 3023F SPIROM DOC REV: CPT | Performed by: STUDENT IN AN ORGANIZED HEALTH CARE EDUCATION/TRAINING PROGRAM

## 2025-07-31 PROCEDURE — G8427 DOCREV CUR MEDS BY ELIG CLIN: HCPCS | Performed by: STUDENT IN AN ORGANIZED HEALTH CARE EDUCATION/TRAINING PROGRAM

## 2025-07-31 RX ORDER — CETIRIZINE HYDROCHLORIDE 10 MG/1
10 TABLET ORAL DAILY
Qty: 30 TABLET | Refills: 0 | Status: SHIPPED | OUTPATIENT
Start: 2025-07-31

## 2025-07-31 RX ORDER — ALBUTEROL SULFATE 90 UG/1
2 AEROSOL, METERED RESPIRATORY (INHALATION) EVERY 6 HOURS PRN
Qty: 18 G | Refills: 3 | Status: SHIPPED | OUTPATIENT
Start: 2025-07-31

## 2025-07-31 RX ORDER — BUDESONIDE AND FORMOTEROL FUMARATE DIHYDRATE 160; 4.5 UG/1; UG/1
2 AEROSOL RESPIRATORY (INHALATION) 2 TIMES DAILY
Qty: 10.2 G | Refills: 3 | Status: SHIPPED | OUTPATIENT
Start: 2025-07-31

## 2025-07-31 NOTE — PROGRESS NOTES
[facility]    Subjective:  New patient  Schizoaffective disorder- psych  DM2; on Trulicity  Diastolic dysfunction  Has upcoming surgery- L ankle bone debridement removal of hardware IM nail sub tubular arthodesis.  BP is controlled  ROS otherwise negative     Past medical, surgical, family and social history were reviewed, non-contributory, and unchanged unless otherwise stated.    Objective:    /72   Pulse 100   Temp 97.3 °F (36.3 °C) (Temporal)   Resp 20   Ht 1.765 m (5' 9.5\")   Wt 114.3 kg (252 lb)   SpO2 95%   BMI 36.68 kg/m²     Exam is as noted by resident with the following changes, additions or corrections:    General:  NAD; alert & oriented x 3   Heart:  RRR, no murmurs, gallops, or rubs.  Lungs:  CTA bilaterally, no wheeze, rales or rhonchi  Abd: bowel sounds present, nontender, nondistended, no masses  Extrem:  No clubbing, cyanosis, or edema    Assessment/Plan:  Dm2: Continue Trulicity, monitor sugars  Diastolic dysfunction  Pre op examination for L ankle debridement  Schizoaffective disorder: stable  With hx of Diastolic dysfunction, will refer to cardiology to get cardiac clearance before surgery  PFT  Follow up as instructed       Attending Physician Statement  I have reviewed the chart, including any radiology or labs. I have seen the patient, discussed the case, including pertinent history and exam findings with the resident.  I agree with the assessment, plan and orders as documented by the resident.  Please refer to the resident note for additional information.      Electronically signed by JANIS HENDERSON MD on 7/31/2025 at 10:22 AM

## 2025-07-31 NOTE — TELEPHONE ENCOUNTER
Spoke with patient.  Offered appt today, patient declined.  Patient states she will reschedule her surgery.  F/U scheduled for 8/20/25 at 11:10 a.m.
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
## 2025-08-04 ENCOUNTER — HOSPITAL ENCOUNTER (OUTPATIENT)
Dept: NEUROLOGY | Age: 60
Discharge: HOME | End: 2025-08-04
Payer: MEDICAID

## 2025-08-04 VITALS — WEIGHT: 249 LBS | BODY MASS INDEX: 36.88 KG/M2 | HEIGHT: 69 IN

## 2025-08-04 PROCEDURE — 95912 NRV CNDJ TEST 11-12 STUDIES: CPT

## 2025-08-04 PROCEDURE — 95886 MUSC TEST DONE W/N TEST COMP: CPT

## 2025-08-20 ENCOUNTER — OFFICE VISIT (OUTPATIENT)
Dept: CARDIOLOGY CLINIC | Age: 60
End: 2025-08-20
Payer: MEDICAID

## 2025-08-20 VITALS
BODY MASS INDEX: 37.56 KG/M2 | HEART RATE: 76 BPM | SYSTOLIC BLOOD PRESSURE: 112 MMHG | RESPIRATION RATE: 18 BRPM | TEMPERATURE: 97 F | DIASTOLIC BLOOD PRESSURE: 80 MMHG | WEIGHT: 262.4 LBS | HEIGHT: 70 IN

## 2025-08-20 DIAGNOSIS — R06.02 SHORTNESS OF BREATH: ICD-10-CM

## 2025-08-20 DIAGNOSIS — I31.39 PERICARDIAL EFFUSION: Primary | ICD-10-CM

## 2025-08-20 DIAGNOSIS — Z01.810 PRE-OPERATIVE CARDIOVASCULAR EXAMINATION: ICD-10-CM

## 2025-08-20 DIAGNOSIS — R94.31 ABNORMAL ELECTROCARDIOGRAPHY: ICD-10-CM

## 2025-08-20 PROCEDURE — 99214 OFFICE O/P EST MOD 30 MIN: CPT | Performed by: INTERNAL MEDICINE

## 2025-08-20 PROCEDURE — 3079F DIAST BP 80-89 MM HG: CPT | Performed by: INTERNAL MEDICINE

## 2025-08-20 PROCEDURE — 3074F SYST BP LT 130 MM HG: CPT | Performed by: INTERNAL MEDICINE

## 2025-08-20 PROCEDURE — G8427 DOCREV CUR MEDS BY ELIG CLIN: HCPCS | Performed by: INTERNAL MEDICINE

## 2025-08-20 PROCEDURE — G2211 COMPLEX E/M VISIT ADD ON: HCPCS | Performed by: INTERNAL MEDICINE

## 2025-08-20 PROCEDURE — 93000 ELECTROCARDIOGRAM COMPLETE: CPT | Performed by: INTERNAL MEDICINE

## 2025-08-20 PROCEDURE — 3017F COLORECTAL CA SCREEN DOC REV: CPT | Performed by: INTERNAL MEDICINE

## 2025-08-20 PROCEDURE — 4004F PT TOBACCO SCREEN RCVD TLK: CPT | Performed by: INTERNAL MEDICINE

## 2025-08-20 PROCEDURE — G8417 CALC BMI ABV UP PARAM F/U: HCPCS | Performed by: INTERNAL MEDICINE

## 2025-08-20 RX ORDER — REGADENOSON 0.08 MG/ML
0.4 INJECTION, SOLUTION INTRAVENOUS
OUTPATIENT
Start: 2025-08-20

## 2025-08-20 RX ORDER — LURASIDONE HYDROCHLORIDE 80 MG/1
TABLET, FILM COATED ORAL
COMMUNITY
Start: 2025-08-08

## (undated) DEVICE — APPLICATOR MEDICATED 26 CC SOLUTION HI LT ORNG CHLORAPREP

## (undated) DEVICE — 3M™ COBAN™ NL STERILE NON-LATEX SELF-ADHERENT WRAP, 2084S, 4 IN X 5 YD (10 CM X 4,5 M), 18 ROLLS/CASE: Brand: 3M™ COBAN™

## (undated) DEVICE — DRESSING HYDROFIBER AQUACEL AG ADVANTAGE 3.5X12 IN

## (undated) DEVICE — TUBING, SUCTION, 9/32" X 10', STRAIGHT: Brand: MEDLINE

## (undated) DEVICE — HANDPIECE SET WITH COAXIAL HIGH FLOW TIP AND SUCTION TUBE: Brand: INTERPULSE

## (undated) DEVICE — SYRINGE,CONTROL,LL,FINGER,GRIP: Brand: MEDLINE INDUSTRIES, INC.

## (undated) DEVICE — 4-PORT MANIFOLD: Brand: NEPTUNE 2

## (undated) DEVICE — GLOVE ORANGE PI 7 1/2   MSG9075

## (undated) DEVICE — BIT DRL L5IN DIA2MM STD ST S STL TWST BUSA

## (undated) DEVICE — 2108 SERIES SAGITTAL BLADE, OFFSET (20.0 X 0.89 X 80.0MM)

## (undated) DEVICE — TOWEL,OR,DSP,ST,BLUE,STD,6/PK,12PK/CS: Brand: MEDLINE

## (undated) DEVICE — COAXIAL HIGH FLOW TIP WITH SOFT SHIELD

## (undated) DEVICE — TOTAL HIP PK

## (undated) DEVICE — KIT SURG W7XL11IN 2 PKT UNTREATED NA

## (undated) DEVICE — 3M™ IOBAN™ 2 ANTIMICROBIAL INCISE DRAPE 6651EZ: Brand: IOBAN™ 2

## (undated) DEVICE — GLOVE ORTHO 8   MSG9480

## (undated) DEVICE — SOLUTION IRRIG 3000ML 0.9% SOD CHL USP UROMATIC PLAS CONT

## (undated) DEVICE — DOUBLE BASIN SET: Brand: MEDLINE INDUSTRIES, INC.

## (undated) DEVICE — WAX SURG 2.5GM HEMSTAT BNE BEESWAX PARAFFIN ISO PALMITATE

## (undated) DEVICE — NEEDLE,22GX1.5",REG,BEVEL: Brand: MEDLINE

## (undated) DEVICE — GLOVE ORANGE PI 8 1/2   MSG9085

## (undated) DEVICE — GLOVE SURG SZ 85 L12IN FNGR THK13MIL WHT ISOLEX POLYISOPRENE

## (undated) DEVICE — SPONGE LAP W18XL18IN WHT COT 4 PLY FLD STRUNG RADPQ DISP ST 2 PER PACK

## (undated) DEVICE — 1000 S-DRAPE TOWEL DRAPE 10/BX: Brand: STERI-DRAPE™

## (undated) DEVICE — BASIC SINGLE BASIN 1-LF: Brand: MEDLINE INDUSTRIES, INC.

## (undated) DEVICE — ELECTRODE PT RET AD L9FT HI MOIST COND ADH HYDRGEL CORDED

## (undated) DEVICE — STRIP,CLOSURE,WOUND,MEDI-STRIP,1/2X4: Brand: MEDLINE

## (undated) DEVICE — PILLOW POS W15XH6XL22IN RASPBERRY FOAM ABD W/ STRP DISP FOR

## (undated) DEVICE — GOWN,SIRUS,FABRNF,XL,20/CS: Brand: MEDLINE

## (undated) DEVICE — HEWSON SUTURE RETRIEVER: Brand: HEWSON SUTURE RETRIEVER

## (undated) DEVICE — DRAPE,REIN 53X77,STERILE: Brand: MEDLINE

## (undated) DEVICE — 3M™ STERI-DRAPE™ U-DRAPE 1015: Brand: STERI-DRAPE™

## (undated) DEVICE — CONVERTORS STOCKINETTE: Brand: CONVERTORS

## (undated) DEVICE — PACK PROCEDURE SURG GEN CUST

## (undated) DEVICE — NEEDLE HYPO 23GA L1.5IN TURQ POLYPR HUB S STL THN WALL IM

## (undated) DEVICE — DRAPE,TOP,102X53,STERILE: Brand: MEDLINE